# Patient Record
Sex: FEMALE | Race: WHITE | NOT HISPANIC OR LATINO | Employment: OTHER | URBAN - METROPOLITAN AREA
[De-identification: names, ages, dates, MRNs, and addresses within clinical notes are randomized per-mention and may not be internally consistent; named-entity substitution may affect disease eponyms.]

---

## 2017-11-09 ENCOUNTER — ALLSCRIPTS OFFICE VISIT (OUTPATIENT)
Dept: OTHER | Facility: OTHER | Age: 82
End: 2017-11-09

## 2017-11-09 LAB
BILIRUB UR QL STRIP: ABNORMAL
CLARITY UR: ABNORMAL
COLOR UR: YELLOW
GLUCOSE (HISTORICAL): ABNORMAL
HGB UR QL STRIP.AUTO: ABNORMAL
KETONES UR STRIP-MCNC: ABNORMAL MG/DL
LEUKOCYTE ESTERASE UR QL STRIP: ABNORMAL
NITRITE UR QL STRIP: ABNORMAL
PH UR STRIP.AUTO: 5 [PH]
PROT UR STRIP-MCNC: ABNORMAL MG/DL
SP GR UR STRIP.AUTO: 1.01
UROBILINOGEN UR QL STRIP.AUTO: 0.2

## 2018-01-13 VITALS
WEIGHT: 171 LBS | HEIGHT: 64 IN | DIASTOLIC BLOOD PRESSURE: 66 MMHG | SYSTOLIC BLOOD PRESSURE: 124 MMHG | BODY MASS INDEX: 29.19 KG/M2

## 2018-05-11 RX ORDER — METFORMIN HYDROCHLORIDE 750 MG/1
1 TABLET, EXTENDED RELEASE ORAL DAILY
Status: ON HOLD | COMMUNITY
End: 2019-09-14 | Stop reason: CLARIF

## 2018-05-11 RX ORDER — GLIPIZIDE 10 MG
1 TABLET, EXTENDED RELEASE 24 HR ORAL DAILY
Status: ON HOLD | COMMUNITY
End: 2019-09-14 | Stop reason: CLARIF

## 2018-05-11 RX ORDER — LOSARTAN POTASSIUM 100 MG/1
1 TABLET ORAL DAILY
COMMUNITY
End: 2019-09-16 | Stop reason: HOSPADM

## 2018-05-11 RX ORDER — DOCUSATE SODIUM 100 MG/1
1 CAPSULE, LIQUID FILLED ORAL 2 TIMES DAILY
Status: ON HOLD | COMMUNITY
End: 2020-02-19 | Stop reason: ALTCHOICE

## 2018-05-11 RX ORDER — LEVOTHYROXINE SODIUM 0.07 MG/1
1 TABLET ORAL DAILY
COMMUNITY
End: 2022-01-01 | Stop reason: SDUPTHER

## 2018-05-11 RX ORDER — OMEPRAZOLE 20 MG/1
1 CAPSULE, DELAYED RELEASE ORAL DAILY
COMMUNITY
End: 2022-01-01

## 2018-05-11 RX ORDER — METOPROLOL SUCCINATE 25 MG/1
1 TABLET, EXTENDED RELEASE ORAL DAILY
COMMUNITY
End: 2020-09-23 | Stop reason: HOSPADM

## 2018-05-11 RX ORDER — FUROSEMIDE 20 MG/1
1 TABLET ORAL DAILY
COMMUNITY
End: 2019-04-24 | Stop reason: SDUPTHER

## 2018-05-11 RX ORDER — CHOLECALCIFEROL (VITAMIN D3) 25 MCG
1 TABLET,CHEWABLE ORAL DAILY
COMMUNITY
End: 2022-01-01 | Stop reason: SDUPTHER

## 2018-05-11 RX ORDER — CLOPIDOGREL BISULFATE 75 MG/1
1 TABLET ORAL DAILY
Status: ON HOLD | COMMUNITY
End: 2019-09-14 | Stop reason: CLARIF

## 2018-05-11 RX ORDER — SIMVASTATIN 40 MG
1 TABLET ORAL DAILY
COMMUNITY
End: 2022-01-01 | Stop reason: SDUPTHER

## 2018-05-11 RX ORDER — REPAGLINIDE 0.5 MG/1
1 TABLET ORAL EVERY 8 HOURS
Status: ON HOLD | COMMUNITY
End: 2019-09-14 | Stop reason: CLARIF

## 2018-05-11 RX ORDER — SOLIFENACIN SUCCINATE 5 MG/1
1 TABLET, FILM COATED ORAL DAILY
COMMUNITY
Start: 2017-11-20 | End: 2019-04-24 | Stop reason: ALTCHOICE

## 2018-05-11 RX ORDER — METHOCARBAMOL 500 MG/1
1 TABLET, FILM COATED ORAL EVERY 6 HOURS PRN
Status: ON HOLD | COMMUNITY
End: 2019-09-14 | Stop reason: CLARIF

## 2018-05-11 RX ORDER — FLURANDRENOLIDE 0.5 MG/ML
LOTION TOPICAL 2 TIMES DAILY
Status: ON HOLD | COMMUNITY
End: 2019-09-14 | Stop reason: CLARIF

## 2018-05-12 ENCOUNTER — TRANSCRIBE ORDERS (OUTPATIENT)
Dept: ADMINISTRATIVE | Facility: HOSPITAL | Age: 83
End: 2018-05-12

## 2018-05-12 ENCOUNTER — HOSPITAL ENCOUNTER (EMERGENCY)
Facility: HOSPITAL | Age: 83
Discharge: HOME | End: 2018-05-12
Attending: EMERGENCY MEDICINE | Admitting: EMERGENCY MEDICINE
Payer: MEDICARE

## 2018-05-12 ENCOUNTER — APPOINTMENT (EMERGENCY)
Dept: RADIOLOGY | Facility: HOSPITAL | Age: 83
End: 2018-05-12
Payer: MEDICARE

## 2018-05-12 VITALS
WEIGHT: 170 LBS | OXYGEN SATURATION: 98 % | SYSTOLIC BLOOD PRESSURE: 190 MMHG | BODY MASS INDEX: 29.18 KG/M2 | TEMPERATURE: 98 F | HEART RATE: 70 BPM | DIASTOLIC BLOOD PRESSURE: 84 MMHG | RESPIRATION RATE: 20 BRPM

## 2018-05-12 DIAGNOSIS — I10 HYPERTENSION: ICD-10-CM

## 2018-05-12 DIAGNOSIS — R10.11 ABDOMINAL PAIN, RIGHT UPPER QUADRANT: Primary | ICD-10-CM

## 2018-05-12 DIAGNOSIS — K80.20 CHOLELITHIASIS: Primary | ICD-10-CM

## 2018-05-12 DIAGNOSIS — R10.9 ABDOMINAL PAIN: ICD-10-CM

## 2018-05-12 DIAGNOSIS — M54.9 BACK PAIN: ICD-10-CM

## 2018-05-12 LAB
ALBUMIN SERPL BCP-MCNC: 3.2 G/DL (ref 3.5–5)
ALP SERPL-CCNC: 93 U/L (ref 46–116)
ALT SERPL W P-5'-P-CCNC: 14 U/L (ref 12–78)
ANION GAP SERPL CALCULATED.3IONS-SCNC: 7 MMOL/L (ref 4–13)
AST SERPL W P-5'-P-CCNC: 12 U/L (ref 5–45)
BASOPHILS # BLD AUTO: 0.01 THOUSANDS/ΜL (ref 0–0.1)
BASOPHILS NFR BLD AUTO: 0 % (ref 0–1)
BILIRUB SERPL-MCNC: 0.26 MG/DL (ref 0.2–1)
BUN SERPL-MCNC: 24 MG/DL (ref 5–25)
CALCIUM SERPL-MCNC: 8.8 MG/DL (ref 8.3–10.1)
CHLORIDE SERPL-SCNC: 105 MMOL/L (ref 100–108)
CO2 SERPL-SCNC: 24 MMOL/L (ref 21–32)
CREAT SERPL-MCNC: 1.7 MG/DL (ref 0.6–1.3)
EOSINOPHIL # BLD AUTO: 0.11 THOUSAND/ΜL (ref 0–0.61)
EOSINOPHIL NFR BLD AUTO: 2 % (ref 0–6)
ERYTHROCYTE [DISTWIDTH] IN BLOOD BY AUTOMATED COUNT: 13.3 % (ref 11.6–15.1)
GFR SERPL CREATININE-BSD FRML MDRD: 27 ML/MIN/1.73SQ M
GLUCOSE SERPL-MCNC: 301 MG/DL (ref 65–140)
HCT VFR BLD AUTO: 35.9 % (ref 34.8–46.1)
HGB BLD-MCNC: 11.5 G/DL (ref 11.5–15.4)
LACTATE SERPL-SCNC: 1.4 MMOL/L (ref 0.5–2)
LIPASE SERPL-CCNC: 105 U/L (ref 73–393)
LYMPHOCYTES # BLD AUTO: 1.25 THOUSANDS/ΜL (ref 0.6–4.47)
LYMPHOCYTES NFR BLD AUTO: 17 % (ref 14–44)
MCH RBC QN AUTO: 30.2 PG (ref 26.8–34.3)
MCHC RBC AUTO-ENTMCNC: 32 G/DL (ref 31.4–37.4)
MCV RBC AUTO: 94 FL (ref 82–98)
MONOCYTES # BLD AUTO: 0.88 THOUSAND/ΜL (ref 0.17–1.22)
MONOCYTES NFR BLD AUTO: 12 % (ref 4–12)
NEUTROPHILS # BLD AUTO: 5.28 THOUSANDS/ΜL (ref 1.85–7.62)
NEUTS SEG NFR BLD AUTO: 69 % (ref 43–75)
NRBC BLD AUTO-RTO: 0 /100 WBCS
PLATELET # BLD AUTO: 190 THOUSANDS/UL (ref 149–390)
PMV BLD AUTO: 11.8 FL (ref 8.9–12.7)
POTASSIUM SERPL-SCNC: 4.6 MMOL/L (ref 3.5–5.3)
PROT SERPL-MCNC: 7 G/DL (ref 6.4–8.2)
RBC # BLD AUTO: 3.81 MILLION/UL (ref 3.81–5.12)
SODIUM SERPL-SCNC: 136 MMOL/L (ref 136–145)
WBC # BLD AUTO: 7.53 THOUSAND/UL (ref 4.31–10.16)

## 2018-05-12 PROCEDURE — 99203 OFFICE O/P NEW LOW 30 MIN: CPT | Performed by: SURGERY

## 2018-05-12 PROCEDURE — 96360 HYDRATION IV INFUSION INIT: CPT

## 2018-05-12 PROCEDURE — 36415 COLL VENOUS BLD VENIPUNCTURE: CPT | Performed by: EMERGENCY MEDICINE

## 2018-05-12 PROCEDURE — 99284 EMERGENCY DEPT VISIT MOD MDM: CPT

## 2018-05-12 PROCEDURE — 76705 ECHO EXAM OF ABDOMEN: CPT

## 2018-05-12 PROCEDURE — 96361 HYDRATE IV INFUSION ADD-ON: CPT

## 2018-05-12 PROCEDURE — 85025 COMPLETE CBC W/AUTO DIFF WBC: CPT | Performed by: EMERGENCY MEDICINE

## 2018-05-12 PROCEDURE — 80053 COMPREHEN METABOLIC PANEL: CPT | Performed by: EMERGENCY MEDICINE

## 2018-05-12 PROCEDURE — 83605 ASSAY OF LACTIC ACID: CPT | Performed by: EMERGENCY MEDICINE

## 2018-05-12 PROCEDURE — 83690 ASSAY OF LIPASE: CPT | Performed by: EMERGENCY MEDICINE

## 2018-05-12 RX ADMIN — SODIUM CHLORIDE 1000 ML: 0.9 INJECTION, SOLUTION INTRAVENOUS at 14:45

## 2018-05-12 NOTE — ED ATTENDING ATTESTATION
Anita Corona MD, saw and evaluated the patient  I have discussed the patient with the resident/non-physician practitioner and agree with the resident's/non-physician practitioner's findings, Plan of Care, and MDM as documented in the resident's/non-physician practitioner's note, except where noted  All available labs and Radiology studies were reviewed  At this point I agree with the current assessment done in the Emergency Department  I have conducted an independent evaluation of this patient a history and physical is as follows:  80year-old developed right upper quadrant pain today  Felt well upon awakening went out to eat ran some errands and then developed sudden severe right upper quadrant abdominal pain  She was seen in urgent care center where she was given an IM medication for pain as well as IV antibiotics and referred here for possible cholecystitis  On physical exam the patient is now non tender with no guarding or rebound  Bedside ultrasound reveals a large stone in the gallbladder with minimal wall thickenning and no sheron cholic fluid     Patient already has a formal ultrasound ordered we will obtain same      Critical Care Time  CritCare Time    Procedures

## 2018-05-12 NOTE — ED NOTES
Pt upset that her "80year old friend is sitting in my car  I want to go out and get her " Explained that we can not walk her outside to look for her friend  Contacted security to retrieve friend        Merry Chao RN  05/12/18 3510

## 2018-05-12 NOTE — DISCHARGE INSTRUCTIONS
Acute Abdominal Pain   WHAT YOU NEED TO KNOW:   What do I need to know about acute abdominal pain? Acute abdominal pain usually starts suddenly and gets worse quickly  What are minor causes of acute abdominal pain? · An allergic reaction to food, or food poisoning    · Stress    · Acid reflux    · Constipation    · Monthly period pain in females  What are serious causes of acute abdominal pain? · Inflammation or rupture of your appendix    · Swelling or an infection in your abdomen or organ    · A blockage in your bowels    · An ulcer or a tear in your esophagus, stomach, or intestines    · Bleeding in your abdomen or an organ    · Stones in your kidney or gallbladder    · Diseases of the fallopian tubes or ovaries    · An ectopic pregnancy  How is the cause of acute abdominal pain diagnosed? Your healthcare provider will ask about your signs and symptoms  Tell the provider when your symptoms started and about any recent travel or surgery  Also tell him what makes the pain better or worse, and what treatments you have tried  The provider will examine you  Based on what your provider finds from the exam, and your symptoms, you may need other tests  Examples include blood or urine tests, an ultrasound, a CT scan, or an endoscopy  How is acute abdominal pain treated? Treatment may depend on the cause of your abdominal pain  You may need any of the following:  · Medicines  may be given to decrease pain, treat an infection, and manage your symptoms, such as constipation  · Surgery  may be needed to treat a serious cause of abdominal pain  Examples include surgery to treat appendicitis or a blockage in your bowels  How can I manage my symptoms? · Apply heat  on your abdomen for 20 to 30 minutes every 2 hours for as many days as directed  Heat helps decrease pain and muscle spasms  · Make changes to the food you eat as directed    Do not eat foods that cause abdominal pain or other symptoms  Eat small meals more often  ¨ Eat more high-fiber foods if you are constipated  High-fiber foods include fruits, vegetables, whole-grain foods, and legumes  ¨ Do not eat foods that cause gas if you have bloating  Examples include broccoli, cabbage, and cauliflower  Do not drink soda or carbonated drinks, because these may also cause gas  ¨ Do not eat foods or drinks that contain sorbitol or fructose if you have diarrhea and bloating  Some examples are fruit juices, candy, jelly, and sugar-free gum  ¨ Do not eat high-fat foods, such as fried foods, cheeseburgers, hot dogs, and desserts  ¨ Limit or do not drink caffeine  Caffeine may make symptoms, such as heart burn or nausea, worse  ¨ Drink plenty of liquids to prevent dehydration from diarrhea or vomiting  Ask your healthcare provider how much liquid to drink each day and which liquids are best for you  · Manage your stress  Stress may cause abdominal pain  Your healthcare provider may recommend relaxation techniques and deep breathing exercises to help decrease your stress  Your healthcare provider may recommend you talk to someone about your stress or anxiety, such as a counselor or a trusted friend  Get plenty of sleep and exercise regularly  · Limit or do not drink alcohol  Alcohol can make your abdominal pain worse  Ask your healthcare provider if it is safe for you to drink alcohol  Also ask how much is safe for you to drink  · Do not smoke  Nicotine and other chemicals in cigarettes can damage your esophagus and stomach  Ask your healthcare provider for information if you currently smoke and need help to quit  E-cigarettes or smokeless tobacco still contain nicotine  Talk to your healthcare provider before you use these products  When should I seek immediate care? · You vomit blood or cannot stop vomiting  · You have blood in your bowel movement or it looks like tar       · You have bleeding from your rectum  · Your abdomen is larger than usual, more painful, and hard  · You have severe pain in your abdomen  · You stop passing gas and having bowel movements  · You feel weak, dizzy, or faint  When should I contact my healthcare provider? · You have a fever  · You have new signs and symptoms  · Your symptoms do not get better with treatment  · You have questions or concerns about your condition or care  CARE AGREEMENT:   You have the right to help plan your care  Learn about your health condition and how it may be treated  Discuss treatment options with your caregivers to decide what care you want to receive  You always have the right to refuse treatment  The above information is an  only  It is not intended as medical advice for individual conditions or treatments  Talk to your doctor, nurse or pharmacist before following any medical regimen to see if it is safe and effective for you  © 2017 [address] [employer] Information is for End User's use only and may not be sold, redistributed or otherwise used for commercial purposes  All illustrations and images included in CareNotes® are the copyrighted property of [employer]  or [employer]  Gallstones   WHAT YOU NEED TO KNOW:   What are gallstones? Gallstones, also called cholelithiasis, are hard substances that form in your gallbladder or bile duct  Your gallbladder and bile duct are located on the right side of your abdomen, near your liver  Your gallbladder stores bile, which helps break down the fat that you eat  Your gallbladder also helps remove certain chemicals from your body  What causes gallstones? Gallstones develop when your gallbladder does not empty correctly  Stones can form from different bile materials   The following may increase your risk:  · Obesity    · Pregnancy    · Having a family member with gallstones    · Diabetes or previous surgery of the intestines    · Rapid weight loss    · Certain medicines, such as estrogen, antibiotics, and cholesterol-lowering medicines  What are the signs and symptoms of gallstones? The most common symptom is severe, constant pain in the right upper abdomen  It is usually just below the ribcage  The pain may also be felt in the right shoulder and between the shoulder blades  You may also have any of the following:  · Nausea and vomiting    · Feeling bloated    · Pale bowel movements    · Dark urine  How are gallstones diagnosed? · Blood tests  may show signs of infection or inflammation  · An abdominal ultrasound  uses sound waves to show pictures of your abdomen on a monitor  · A liver and gallbladder scan  may also be called a HIDA scan  You are given a small amount of radioactive dye in your IV and pictures are taken by a scanner  Your healthcare provider looks at the pictures to see if your liver and gallbladder are working normally  · An ERCP  is also called an endoscopic retrograde cholangiopancreatography  This test is done during an endoscopy to find stones, tumors, or other problems  Dye is put into the endoscopy tube  The dye helps your pancreas and bile ducts show up better on x-rays  Tell the healthcare provider if you have ever had an allergic reaction to contrast dye  If you have stones, they may be removed during ERCP  · Oral cholecystography  is a test to look at your gallbladder and its ducts (passages)  You will take pills that have a special dye in them  Then x-rays are taken over time  The dye makes your gallbladder and its ducts show up on the x-rays  This may make it easier for your healthcare provider to see any stones or swelling in your gallbladder  Tell the healthcare provider if you have ever had an allergic reaction to contrast dye  It is also very important to tell your healthcare provider if there is any chance you could be pregnant   Your healthcare provider will tell you what you can and cannot eat before the test  It is important to follow your healthcare provider's instructions or the test may not work  How are gallstones treated? · Antinausea medicine  may be given to calm your stomach and to help prevent vomiting  · Prescription pain medicine  may be given  Ask your healthcare provider how to take this medicine safely  · A cholecystectomy  is surgery to remove your gallbladder  When should I contact my healthcare provider? · You have nausea and are vomiting  · Your urine is dark  · You have livier-colored bowel movements  · You have questions or concerns about your condition or care  When should I seek immediate care or call 911? · You have a fever and chills  · Your eyes or skin turn yellow  · You have severe pain in your upper abdomen, just below the right ribcage  CARE AGREEMENT:   You have the right to help plan your care  Learn about your health condition and how it may be treated  Discuss treatment options with your caregivers to decide what care you want to receive  You always have the right to refuse treatment  The above information is an  only  It is not intended as medical advice for individual conditions or treatments  Talk to your doctor, nurse or pharmacist before following any medical regimen to see if it is safe and effective for you  © 2017 [employer]  Information is for End User's use only and may not be sold, redistributed or otherwise used for commercial purposes  All illustrations and images included in CareNotes® are the copyrighted property of [employer]  or [employer]  Hypertension   WHAT YOU NEED TO KNOW:   Hypertension is high blood pressure (BP)  Your BP is the force of your blood moving against the walls of your arteries  Normal BP is less than 120/80  Prehypertension is between 120/80 and 139/89  Hypertension is 140/90 or higher   Hypertension causes your BP to get so high that your heart has to work much harder than normal  This can damage your heart  You can control hypertension with a healthy lifestyle or medicines  A controlled blood pressure helps protect your organs, such as your heart, lungs, brain, and kidneys  DISCHARGE INSTRUCTIONS:   Call 911 for any of the following:   · You have discomfort in your chest that feels like squeezing, pressure, fullness, or pain  · You become confused or have difficulty speaking  · You suddenly feel lightheaded or have trouble breathing  · You have pain or discomfort in your back, neck, jaw, stomach, or arm  Return to the emergency department if:   · You have a severe headache or vision loss  · You have weakness in an arm or leg  Contact your healthcare provider if:   · You feel faint, dizzy, confused, or drowsy  · You have been taking your BP medicine and your BP is still higher than your healthcare provider says it should be  · You have questions or concerns about your condition or care  Medicines: You may  need any of the following:  · Medicine  may be used to help lower your BP  You may need more than one type of medicine  Take the medicine exactly as directed  · Diuretics  help decrease extra fluid that collects in your body  This will help lower your BP  You may urinate more often while you take this medicine  · Cholesterol medicine  helps lower your cholesterol level  A low cholesterol level helps prevent heart disease and makes it easier to control your blood pressure  · Take your medicine as directed  Contact your healthcare provider if you think your medicine is not helping or if you have side effects  Tell him or her if you are allergic to any medicine  Keep a list of the medicines, vitamins, and herbs you take  Include the amounts, and when and why you take them  Bring the list or the pill bottles to follow-up visits  Carry your medicine list with you in case of an emergency    Follow up with your healthcare provider as directed: You will need to return to have your BP checked and to have other lab tests done  Write down your questions so you remember to ask them during your visits  Manage hypertension:  Talk with your healthcare provider about these and other ways to manage hypertension:  · Check your BP at home  Sit and rest for 5 minutes before you take your BP  Extend your arm and support it on a flat surface  Your arm should be at the same level as your heart  Follow the directions that came with your BP monitor  If possible, take at least 2 BP readings each time  Take your BP at least twice a day at the same times each day, such as morning and evening  Keep a record of your BP readings and bring it to your follow-up visits  Ask your healthcare provider what your BP should be  · Limit sodium (salt) as directed  Too much sodium can affect your fluid balance  Check labels to find low-sodium or no-salt-added foods  Some low-sodium foods use potassium salts for flavor  Too much potassium can also cause health problems  Your healthcare provider will tell you how much sodium and potassium are safe for you to have in a day  He or she may recommend that you limit sodium to 2,300 mg a day  · Follow the meal plan recommended by your healthcare provider  A dietitian or your provider can give you more information on low-sodium plans or the DASH (Dietary Approaches to Stop Hypertension) eating plan  The DASH plan is low in sodium, unhealthy fats, and total fat  It is high in potassium, calcium, and fiber  · Exercise to maintain a healthy weight  Exercise at least 30 minutes per day, on most days of the week  This will help decrease your blood pressure  Ask your healthcare provider about the best exercise plan for you  · Decrease stress  This may help lower your BP  Learn ways to relax, such as deep breathing or listening to music  · Limit alcohol  Women should limit alcohol to 1 drink a day  Men should limit alcohol to 2 drinks a day  A drink of alcohol is 12 ounces of beer, 5 ounces of wine, or 1½ ounces of liquor  · Do not smoke  Nicotine and other chemicals in cigarettes and cigars can increase your BP and also cause lung damage  Ask your healthcare provider for information if you currently smoke and need help to quit  E-cigarettes or smokeless tobacco still contain nicotine  Talk to your healthcare provider before you use these products  · Manage any other health conditions you have  Health conditions such as diabetes can increase your risk for hypertension  Follow your healthcare provider's instructions and take all your medicines as directed  © 2017 [address] [facility] Information is for End User's use only and may not be sold, redistributed or otherwise used for commercial purposes  All illustrations and images included in CareNotes® are the copyrighted property of [employer]  or [employer]  The above information is an  only  It is not intended as medical advice for individual conditions or treatments  Talk to your doctor, nurse or pharmacist before following any medical regimen to see if it is safe and effective for you

## 2018-05-12 NOTE — CONSULTS
Consultation - General Surgery   Marge Turner 80 y o  female MRN: [id number]  Unit/Bed#: ED 13 Encounter: [id number]    Assessment/Plan     Assessment:    87y/o presenting with acute RUQ pain found to have GB sludge & cholelithiasis  Pain has now resolved and pt would like to continue conservative treatment for now      Plan:  Pt would like to continue with conservative treatment for now    Pt will schedule cholecystectomy as outpatient if she decides to go further with surgical intervention - she can follow up with Dr Teena Willoughby in the office   [employer] to d/c - as no surgical intervention planned at this time       History of Present Illness     HPI:  Marge Turner is a 80 y o  female who presents with acute onset of right upper quadrant pain that started a few hours after eating a large breakfast of sausage and Portuguese toast  Pt states pain was severe and sharp  She has never had pain like this before  No nausea or vomiting  She went to a walk in clinic, was given IV abx & pain medicine and sent to the ED for an [address]  When she arrived in the ED she was no longer c/o abd pain  US showed cholelithiasis and sludge but no pericholecystic fluid  She was non tender on exam     Inpatient consult to Acute Care Surgery  Consult performed by: Wang Garcia ordered by: Yolanda Vail          Review of Systems   Constitutional: Negative  Respiratory: Negative  Cardiovascular: Negative  Gastrointestinal: Positive for abdominal pain  Negative for constipation, diarrhea, nausea and vomiting  Endocrine: Negative  Genitourinary: Negative  Musculoskeletal: Negative  Skin: Negative  All other systems reviewed and are negative        Historical Information   Past Medical History:   Diagnosis Date    Acquired absence of kidney     Frequency of micturition     Malignant neoplasm of left kidney, except renal pelvis (HCC)     Nocturia     Personal history of other malignant neoplasm of kidney  Renal mass, left     UTI (urinary tract infection)      Past Surgical History:   Procedure Laterality Date    RADICAL NEPHRECTOMY Left 2013     Social History   History   Alcohol Use No     History   Drug Use No     History   Smoking Status    Never Smoker   Smokeless Tobacco    Never Used     Family History:   Family History   Problem Relation Age of Onset    Diabetes Father     Heart failure Brother        Meds/Allergies   all current active meds have been reviewed, current meds:   No current facility-administered medications for this encounter  and PTA meds:   Prior to Admission Medications   Prescriptions Last Dose Informant Patient Reported? Taking?    Cyanocobalamin (B-12) 1000 MCG CAPS   Yes No   Sig: Take 1 tablet by mouth daily   Linagliptin (TRADJENTA) 5 MG TABS   Yes No   Sig: Take 1 tablet by mouth daily   aspirin 81 MG tablet   Yes No   Sig: Take 1 tablet by mouth daily   clopidogrel (PLAVIX) 75 mg tablet   Yes No   Sig: Take 1 tablet by mouth daily   diclofenac sodium (VOLTAREN) 1 %   Yes No   Sig: Place on the skin   docusate sodium (COLACE) 100 mg capsule   Yes No   Sig: Take 1 capsule by mouth 2 (two) times a day   flurandrenolide (CORDRAN) 0 05 % lotion   Yes No   Sig: Apply topically 2 (two) times a day   furosemide (LASIX) 20 mg tablet   Yes No   Sig: Take 1 tablet by mouth daily   glipiZIDE (GLUCOTROL XL) 10 mg 24 hr tablet   Yes No   Sig: Take 1 tablet by mouth daily   levothyroxine 75 mcg tablet   Yes No   Sig: Take 1 tablet by mouth daily   losartan (COZAAR) 100 MG tablet   Yes No   Sig: Take 1 tablet by mouth daily   magnesium chloride (MAG-SR) 535 mg   Yes No   Sig: Take 1 tablet by mouth daily   metFORMIN (GLUCOPHAGE-XR) 750 mg 24 hr tablet   Yes No   Sig: Take 1 tablet by mouth daily   methocarbamol (ROBAXIN) 500 mg tablet   Yes No   Sig: Take 1 tablet by mouth every 6 (six) hours as needed   metoprolol succinate (TOPROL-XL) 25 mg 24 hr tablet   Yes No   Sig: Take 1 tablet by mouth daily   omeprazole (PRILOSEC) 20 mg delayed release capsule   Yes No   Sig: Take 1 capsule by mouth daily   repaglinide (PRANDIN) 0 5 mg tablet   Yes No   Sig: Take 1 tablet by mouth every 8 (eight) hours   simvastatin (ZOCOR) 40 mg tablet   Yes No   Sig: Take 1 tablet by mouth daily   solifenacin (VESICARE) 5 mg tablet   Yes No   Sig: Take 1 tablet by mouth daily      Facility-Administered Medications: None     Allergies   Allergen Reactions    Cephalexin     Clindamycin     Doxycycline     Erythromycin Base     Lactate     Levaquin [Levofloxacin]     Penicillins     Procainamide     Quinidine (Non-Therapeutic)     Sulfa Antibiotics        Objective   First Vitals:   Blood Pressure: (!) 202/93 (05/12/18 1343)  Pulse: 63 (05/12/18 1343)  Temperature: 98 °F (36 7 °C) (05/12/18 1343)  Temp Source: Oral (05/12/18 1343)  Respirations: 18 (05/12/18 1343)  Weight - Scale: 77 1 kg (170 lb) (05/12/18 1343)  SpO2: 99 % (05/12/18 1343)    Current Vitals:   Blood Pressure: (!) 209/89 (05/12/18 1725)  Pulse: 75 (05/12/18 1725)  Temperature: 98 °F (36 7 °C) (05/12/18 1343)  Temp Source: Oral (05/12/18 1343)  Respirations: 20 (05/12/18 1725)  Weight - Scale: 77 1 kg (170 lb) (05/12/18 1343)  SpO2: 100 % (05/12/18 1725)    No intake or output data in the 24 hours ending 05/12/18 1757    Invasive Devices     Peripheral Intravenous Line            Peripheral IV 05/12/18 Left Antecubital less than 1 day                Physical Exam   Constitutional: She is oriented to person, place, and time  She appears well-developed and well-nourished  No distress  Cardiovascular: Normal rate, regular rhythm and normal heart sounds  Exam reveals no gallop and no friction rub  No murmur heard  Pulmonary/Chest: Breath sounds normal  No respiratory distress  She has no wheezes  She has no rales  She exhibits no tenderness  Abdominal: Soft  Bowel sounds are normal  She exhibits no distension and no mass   There is no tenderness  There is no rebound and no guarding  Musculoskeletal: Normal range of motion  Neurological: She is alert and oriented to person, place, and time  Skin: Skin is warm and dry  She is not diaphoretic  Nursing note and vitals reviewed  Lab Results:   I have personally reviewed pertinent lab results  , CBC:   Lab Results   Component Value Date    WBC 7 53 05/12/2018    HGB 11 5 05/12/2018    HCT 35 9 05/12/2018    MCV 94 05/12/2018     05/12/2018    MCH 30 2 05/12/2018    MCHC 32 0 05/12/2018    RDW 13 3 05/12/2018    MPV 11 8 05/12/2018    NRBC 0 05/12/2018   , CMP:   Lab Results   Component Value Date     05/12/2018    K 4 6 05/12/2018     05/12/2018    CO2 24 05/12/2018    ANIONGAP 7 05/12/2018    BUN 24 05/12/2018    CREATININE 1 70 (H) 05/12/2018    GLUCOSE 301 (H) 05/12/2018    CALCIUM 8 8 05/12/2018    AST 12 05/12/2018    ALT 14 05/12/2018    ALKPHOS 93 05/12/2018    PROT 7 0 05/12/2018    BILITOT 0 26 05/12/2018    EGFR 27 05/12/2018   , Coagulation: No results found for: PT, INR, APTT, Urinalysis: No results found for: Catarina Body, SPECGRAV, PHUR, LEUKOCYTESUR, NITRITE, PROTEINUA, GLUCOSEU, KETONESU, BILIRUBINUR, BLOODU, Amylase: No results found for: AMYLASE, Lipase:   Lab Results   Component Value Date    LIPASE 105 05/12/2018     Imaging: I have personally reviewed pertinent reports  and I have personally reviewed pertinent films in PACS     Us Right Upper Quadrant    Result Date: 5/12/2018  Narrative: RIGHT UPPER QUADRANT ULTRASOUND INDICATION:     Right upper quadrant pain  Evaluate for cholecystitis  COMPARISON:  None TECHNIQUE:   Real-time ultrasound of the right upper quadrant was performed with a curvilinear transducer with both volumetric sweeps and still imaging techniques  FINDINGS: PANCREAS:  Visualized portions of the pancreas are within normal limits  AORTA AND IVC:  Visualized portions are normal for patient age  LIVER: Size:  Enlarged    The liver measures 17 1 cm in the midclavicular line  Contour:  Surface contour is smooth  Parenchyma: There is mild diffuse increased echogenicity with smooth echotexture, without significant beam attenuation or loss of periportal echogenicity  Most consistent with mild hepatic steatosis  No evidence of suspicious mass  Limited imaging of the main portal vein shows it to be patent and hepatopetal   BILIARY: Gallbladder is distended measuring 10 4 cm with sludge and stones  Thickened wall measuring 4 mm  No pericholecystic fluid  No sonographic Carver's sign  No intrahepatic biliary dilatation  CBD measures 5 mm  No choledocholithiasis  KIDNEY: Right kidney measures 9 5 x 4 5 cm  Simple lower pole cyst measures 7 x 8 mm  ASCITES:   None  Impression: Distended gallbladder with stones and sludge and wall thickening  No pericholecystic fluid  Negative Carver's sign  Findings may represent acute cholecystitis in the appropriate clinical setting  Hepatomegaly with fatty infiltration  EKG, Pathology, and Other Studies: I have personally reviewed pertinent reports  Counseling / Coordination of Care  Total floor / unit time spent today 30 minutes  Greater than 50% of total time was spent with the patient and / or family counseling and / or coordination of care

## 2018-05-12 NOTE — ED PROVIDER NOTES
History  Chief Complaint   Patient presents with    Abdominal Pain     Patient reports that she was sent here by [facility]  Says that she was sent in for an ultrasound, states that she was told that it could be her gallbladder or liver  No blood work was  done, patient recieved IV abx  HPI   20-year-old female with history of CAD  Presenting to the emergency room for evaluation of abdominal pain  Patient says it started suddenly earlier today  She says is a twisting pain in the right upper quadrant  She had eaten breakfast, went to get her hair done, came home and suddenly had the pain  She went to an urgent care clinic, was given pain medication, intramuscularly, and she said she felt better  Currently, she says the pain is resolved  She says that before, the pain was nonradiating, never had pain like that before  She did have some nausea, no emesis  Currently, she says she feels fine  Apparently, patient was scheduled for an outpatient ultrasound today, however she came to the ER instead  She may have been confused about where she had ago  Patient denies any fevers and chills, no chest pain shortness of breath, no urinary complaints, had a bowel movement this morning with no blood and normal and caliber  Patient's surgical history includes appendectomy  Additional hx of CKD  Patient denies any alcohol use  Prior to Admission Medications   Prescriptions Last Dose Informant Patient Reported? Taking?    Cyanocobalamin (B-12) 1000 MCG CAPS   Yes No   Sig: Take 1 tablet by mouth daily   Linagliptin (TRADJENTA) 5 MG TABS   Yes No   Sig: Take 1 tablet by mouth daily   aspirin 81 MG tablet   Yes No   Sig: Take 1 tablet by mouth daily   clopidogrel (PLAVIX) 75 mg tablet   Yes No   Sig: Take 1 tablet by mouth daily   diclofenac sodium (VOLTAREN) 1 %   Yes No   Sig: Place on the skin   docusate sodium (COLACE) 100 mg capsule   Yes No   Sig: Take 1 capsule by mouth 2 (two) times a day flurandrenolide (CORDRAN) 0 05 % lotion   Yes No   Sig: Apply topically 2 (two) times a day   furosemide (LASIX) 20 mg tablet   Yes No   Sig: Take 1 tablet by mouth daily   glipiZIDE (GLUCOTROL XL) 10 mg 24 hr tablet   Yes No   Sig: Take 1 tablet by mouth daily   levothyroxine 75 mcg tablet   Yes No   Sig: Take 1 tablet by mouth daily   losartan (COZAAR) 100 MG tablet   Yes No   Sig: Take 1 tablet by mouth daily   magnesium chloride (MAG-SR) 535 mg   Yes No   Sig: Take 1 tablet by mouth daily   metFORMIN (GLUCOPHAGE-XR) 750 mg 24 hr tablet   Yes No   Sig: Take 1 tablet by mouth daily   methocarbamol (ROBAXIN) 500 mg tablet   Yes No   Sig: Take 1 tablet by mouth every 6 (six) hours as needed   metoprolol succinate (TOPROL-XL) 25 mg 24 hr tablet   Yes No   Sig: Take 1 tablet by mouth daily   omeprazole (PRILOSEC) 20 mg delayed release capsule   Yes No   Sig: Take 1 capsule by mouth daily   repaglinide (PRANDIN) 0 5 mg tablet   Yes No   Sig: Take 1 tablet by mouth every 8 (eight) hours   simvastatin (ZOCOR) 40 mg tablet   Yes No   Sig: Take 1 tablet by mouth daily   solifenacin (VESICARE) 5 mg tablet   Yes No   Sig: Take 1 tablet by mouth daily      Facility-Administered Medications: None       Past Medical History:   Diagnosis Date    Acquired absence of kidney     Frequency of micturition     Malignant neoplasm of left kidney, except renal pelvis (HCC)     Nocturia     Personal history of other malignant neoplasm of kidney     Renal mass, left     UTI (urinary tract infection)        Past Surgical History:   Procedure Laterality Date    RADICAL NEPHRECTOMY Left 2013       Family History   Problem Relation Age of Onset    Diabetes Father     Heart failure Brother      I have reviewed and agree with the history as documented      Social History   Substance Use Topics    Smoking status: Never Smoker    Smokeless tobacco: Never Used    Alcohol use No        Review of Systems    Constitutional: Negative for appetite change, chills and fever  HENT: Negative for congestion, rhinorrhea and sore throat  Eyes: Negative for photophobia, pain and visual disturbance  Respiratory: Negative for cough, chest tightness and shortness of breath  Cardiovascular: Negative for chest pain, palpitations and leg swelling  Gastrointestinal:   Positive for abdominal pain and nausea,  Negative for diarrhea and vomiting  Genitourinary: Negative for dysuria, flank pain and hematuria  Musculoskeletal: Negative for back pain, neck pain and neck stiffness  Skin: Negative for color change, rash and wound  Neurological: Negative for dizziness, numbness and headaches  All other systems reviewed and are negative        Physical Exam  ED Triage Vitals [05/12/18 1343]   Temperature Pulse Respirations Blood Pressure SpO2   98 °F (36 7 °C) 63 18 (!) 202/93 99 %      Temp Source Heart Rate Source Patient Position - Orthostatic VS BP Location FiO2 (%)   Oral Monitor Sitting Right arm --      Pain Score       No Pain           Orthostatic Vital Signs  Vitals:    05/12/18 1343 05/12/18 1725 05/12/18 1838   BP: (!) 202/93 (!) 209/89 (!) 190/84   Pulse: 63 75 70   Patient Position - Orthostatic VS: Sitting Lying Lying       Physical Exam  BP (!) 190/84 (BP Location: Right arm)   Pulse 70   Temp 98 °F (36 7 °C) (Oral)   Resp 20   Wt 77 1 kg (170 lb)   SpO2 98%   BMI 29 18 kg/m²     General Appearance:  Alert, cooperative, no distress   Head:  Normocephalic, without obvious abnormality, atraumatic   Eyes:  PERRL, conjunctiva/corneas clear, EOM's intact         Nose: Nares normal, septum midline,mucosa normal, no drainage or sinus tenderness   Throat: Lips, mucosa, and tongue normal; teeth and gums normal   Neck: Supple, symmetrical, trachea midline, no adenopathy   Back:   Symmetric, no curvature, ROM normal, no CVA tenderness   Lungs:   Clear to auscultation bilaterally, respirations unlabored   Heart:  Regular rate and rhythm, S1 and S2 normal, no murmur, rub, or gallop   Abdomen:   Soft,  nondistended, no tenderness even in the right upper quadrant, bowel sounds active all four quadrants   bedside ultrasound reveals distended gallbladder, there is a stone that is about 1 5 cm in length,  Patient with gallbladder wall thickening at 5 mm  Negative Carver sign  Pelvic: Deferred   Extremities: Extremities normal, atraumatic, no cyanosis or edema   Pulses: 2+ and symmetric   Skin: Skin color, texture, turgor normal, no rashes or lesions   Neurologic:      Psychiatric: Moves all extremities, sensation and strength in tact in all extremities    Normal mood and affect         ED Medications  Medications   sodium chloride 0 9 % bolus 1,000 mL (1,000 mL Intravenous New Bag 5/12/18 1445)       Diagnostic Studies  Results Reviewed     Procedure Component Value Units Date/Time    CBC and differential [[id number]] Collected:  05/12/18 1445    Lab Status:  Final result Specimen:  Blood from Arm, Left Updated:  05/12/18 1547     WBC 7 53 Thousand/uL      RBC 3 81 Million/uL      Hemoglobin 11 5 g/dL      Hematocrit 35 9 %      MCV 94 fL      MCH 30 2 pg      MCHC 32 0 g/dL      RDW 13 3 %      MPV 11 8 fL      Platelets 401 Thousands/uL      nRBC 0 /100 WBCs      Neutrophils Relative 69 %      Lymphocytes Relative 17 %      Monocytes Relative 12 %      Eosinophils Relative 2 %      Basophils Relative 0 %      Neutrophils Absolute 5 28 Thousands/µL      Lymphocytes Absolute 1 25 Thousands/µL      Monocytes Absolute 0 88 Thousand/µL      Eosinophils Absolute 0 11 Thousand/µL      Basophils Absolute 0 01 Thousands/µL     Lactic acid, plasma [[id number]]  (Normal) Collected:  05/12/18 1445    Lab Status:  Final result Specimen:  Blood from Arm, Left Updated:  05/12/18 1531     LACTIC ACID 1 4 mmol/L     Narrative:         Result may be elevated if tourniquet was used during collection      Comprehensive metabolic panel [[id number]]  (Abnormal) Collected:  05/12/18 1445    Lab Status:  Final result Specimen:  Blood from Arm, Left Updated:  05/12/18 1522     Sodium 136 mmol/L      Potassium 4 6 mmol/L      Chloride 105 mmol/L      CO2 24 mmol/L      Anion Gap 7 mmol/L      BUN 24 mg/dL      Creatinine 1 70 (H) mg/dL      Glucose 301 (H) mg/dL      Calcium 8 8 mg/dL      AST 12 U/L      ALT 14 U/L      Alkaline Phosphatase 93 U/L      Total Protein 7 0 g/dL      Albumin 3 2 (L) g/dL      Total Bilirubin 0 26 mg/dL      eGFR 27 ml/min/1 73sq m     Narrative:         National Kidney Disease Education Program recommendations are as follows:  GFR calculation is accurate only with a steady state creatinine  Chronic Kidney disease less than 60 ml/min/1 73 sq  meters  Kidney failure less than 15 ml/min/1 73 sq  meters  Lipase [[id number]]  (Normal) Collected:  05/12/18 1445    Lab Status:  Final result Specimen:  Blood from Arm, Left Updated:  05/12/18 1522     Lipase 105 u/L                  US right upper quadrant   Final Result by Kely Urias MD (05/12 1718)      Distended gallbladder with stones and sludge and wall thickening  No pericholecystic fluid  Negative Carver's sign  Findings may represent acute cholecystitis in the appropriate clinical setting  Hepatomegaly with fatty infiltration  Workstation performed: [id number]               Procedures  Procedures      Phone Consults  ED Phone Contact    ED Course  ED Course as of May 12 1849   Sat May 12, 2018   1531   On review of prior labs, patient's creatinine anywhere from 1 5-1 8     1820  Cleared by surgery for discharge  Identification of Seniors at Risk      Most Recent Value   (ISAR) Identification of Seniors at Risk   Before the illness or injury that brought you to the Emergency, did you need someone to help you on a regular basis?   0 Filed at: 05/12/2018 1347   In the last 24 hours, have you needed more help than usual?  0 Filed at: 05/12/2018 1347   Have you been hospitalized for one or more nights during the past 6 months? 0 Filed at: 05/12/2018 1347   In general, do you see well?  0 Filed at: 05/12/2018 1347   In general, do you have serious problems with your memory? 0 Filed at: 05/12/2018 1347   Do you take more than three different medications every day? 1 Filed at: 05/12/2018 1347   ISAR Score  1 Filed at: 05/12/2018 1347            MDM     There is concern for possible cholecystitis given findings on ultrasound  Will send patient for right upper quadrant ultrasound, will check abdominal labs  Treat with IV fluids now  Reassess  We will have General surgery see patient  CritCare Time    Disposition  Final diagnoses:   Cholelithiasis   Abdominal pain   Back pain   Hypertension     Time reflects when diagnosis was documented in both MDM as applicable and the Disposition within this note     Time User Action Codes Description Comment    5/12/2018  5:41 PM Karrin Leo Add [K80 20] Cholelithiasis     5/12/2018  6:47 PM Karrin [place] Add [R10 9] Abdominal pain     5/12/2018  6:47 PM Karrin [place] Add [M54 9] Back pain     5/12/2018  6:47 PM Gigi Jimenez Hypertension       ED Disposition     ED Disposition Condition Comment    Discharge  [facility] discharge to home/self care      Condition at discharge: Stable        Follow-up Information     Follow up With Specialties Details Why Contact Info Additional Information    Kehinde Shultz MD General Surgery, Critical Care Medicine Follow up Please call the office and schedule an appointment to discuss cholecystectomy  [facility] [address] [facility]       Dara Alford MD Internal Medicine Go in 2 days for eval of blood pressure [address] [phone] [address] Emergency Department Emergency Medicine  If symptoms worsen [address]  [phone]  ED, [address] [place] [address], [phone]        Patient's Medications   Discharge Prescriptions    No medications on file     No discharge procedures on file  ED Provider  Attending physically available and evaluated [facility]  I managed the patient along with the ED Attending      Electronically Signed by         Olivier Dee MD  05/12/18 5881

## 2018-05-16 ENCOUNTER — OFFICE VISIT (OUTPATIENT)
Dept: UROLOGY | Facility: MEDICAL CENTER | Age: 83
End: 2018-05-16
Payer: MEDICARE

## 2018-05-16 VITALS
BODY MASS INDEX: 30.12 KG/M2 | WEIGHT: 170 LBS | DIASTOLIC BLOOD PRESSURE: 64 MMHG | SYSTOLIC BLOOD PRESSURE: 100 MMHG | HEIGHT: 63 IN

## 2018-05-16 DIAGNOSIS — K80.11 CALCULUS OF GALLBLADDER WITH CHOLECYSTITIS WITH BILIARY OBSTRUCTION, UNSPECIFIED CHOLECYSTITIS ACUITY: ICD-10-CM

## 2018-05-16 DIAGNOSIS — R35.0 FREQUENCY OF URINATION: ICD-10-CM

## 2018-05-16 DIAGNOSIS — Z90.5 ACQUIRED ABSENCE OF KIDNEY: ICD-10-CM

## 2018-05-16 DIAGNOSIS — Z85.528 PERSONAL HISTORY OF OTHER MALIGNANT NEOPLASM OF KIDNEY: Primary | ICD-10-CM

## 2018-05-16 LAB
POST-VOID RESIDUAL VOLUME, ML POC: 0 ML
SL AMB  POCT GLUCOSE, UA: ABNORMAL
SL AMB LEUKOCYTE ESTERASE,UA: ABNORMAL
SL AMB POCT BILIRUBIN,UA: ABNORMAL
SL AMB POCT BLOOD,UA: ABNORMAL
SL AMB POCT CLARITY,UA: CLEAR
SL AMB POCT COLOR,UA: ABNORMAL
SL AMB POCT KETONES,UA: ABNORMAL
SL AMB POCT NITRITE,UA: ABNORMAL
SL AMB POCT PH,UA: 5.5
SL AMB POCT SPECIFIC GRAVITY,UA: 1.02
SL AMB POCT URINE PROTEIN: 100
SL AMB POCT UROBILINOGEN: 0.2

## 2018-05-16 PROCEDURE — 99215 OFFICE O/P EST HI 40 MIN: CPT | Performed by: UROLOGY

## 2018-05-16 PROCEDURE — 81003 URINALYSIS AUTO W/O SCOPE: CPT | Performed by: UROLOGY

## 2018-05-16 PROCEDURE — 51798 US URINE CAPACITY MEASURE: CPT | Performed by: UROLOGY

## 2018-05-16 NOTE — PROGRESS NOTES
Assessment/Plan:        Diagnoses and all orders for this visit:    Personal history of other malignant neoplasm of kidney  -     POCT Measure PVR  -     POCT urine dip auto non-scope    Acquired absence of kidney    Frequency of urination    Calculus of gallbladder, unspecified cholecystitis acuity  -     Ambulatory referral to General Surgery; Future    Other orders  -     aspirin 81 MG tablet; Take 1 tablet by mouth daily  -     Cyanocobalamin (B-12) 1000 MCG CAPS; Take 1 tablet by mouth daily  -     docusate sodium (COLACE) 100 mg capsule; Take 1 capsule by mouth 2 (two) times a day  -     flurandrenolide (CORDRAN) 0 05 % lotion; Apply topically 2 (two) times a day  -     furosemide (LASIX) 20 mg tablet; Take 1 tablet by mouth daily  -     glipiZIDE (GLUCOTROL XL) 10 mg 24 hr tablet; Take 1 tablet by mouth daily  -     levothyroxine 75 mcg tablet; Take 1 tablet by mouth daily  -     losartan (COZAAR) 100 MG tablet; Take 1 tablet by mouth daily  -     magnesium chloride (MAG-SR) 535 mg; Take 1 tablet by mouth daily  -     metFORMIN (GLUCOPHAGE-XR) 750 mg 24 hr tablet; Take 1 tablet by mouth daily  -     methocarbamol (ROBAXIN) 500 mg tablet; Take 1 tablet by mouth every 6 (six) hours as needed  -     metoprolol succinate (TOPROL-XL) 25 mg 24 hr tablet; Take 1 tablet by mouth daily  -     clopidogrel (PLAVIX) 75 mg tablet; Take 1 tablet by mouth daily  -     repaglinide (PRANDIN) 0 5 mg tablet; Take 1 tablet by mouth every 8 (eight) hours  -     omeprazole (PRILOSEC) 20 mg delayed release capsule; Take 1 capsule by mouth daily  -     simvastatin (ZOCOR) 40 mg tablet; Take 1 tablet by mouth daily  -     Linagliptin (TRADJENTA) 5 MG TABS; Take 1 tablet by mouth daily  -     diclofenac sodium (VOLTAREN) 1 %; Place on the skin  -     solifenacin (VESICARE) 5 mg tablet; Take 1 tablet by mouth daily        Subjective:  Right upper quadrant discomfort     Patient ID: Frankey Meals is a 80 y o  female      HPI  She is now 5 years after her laparoscopic robotic left radical nephrectomy for stage pT1 RCC  Only urologic complaint is that of baseline frequency, not an issue  This past weekend, she was in the emergency room for right upper quadrant pain and found to have cholelithiasis  She was discharged from the emergency room, but she is not sure of the direction she needs to take at this point  Review of Systems   Constitutional: Negative  Negative for unexpected weight change  HENT: Negative  Eyes: Negative  Respiratory: Negative  Cardiovascular: Negative  Gastrointestinal: Positive for abdominal pain  Endocrine: Negative  Genitourinary: Positive for frequency  Musculoskeletal: Positive for back pain  Skin: Negative  Allergic/Immunologic: Negative  Neurological: Negative  Hematological: Negative  Psychiatric/Behavioral: Negative  Objective:     Physical Exam   Constitutional: She is oriented to person, place, and time  She appears well-developed and well-nourished  No distress  HENT:   Head: Normocephalic and atraumatic  Eyes: Conjunctivae are normal    Neck: Normal range of motion  Neck supple  Pulmonary/Chest: Effort normal and breath sounds normal    Abdominal: Soft  Bowel sounds are normal  She exhibits no mass  There is no tenderness  There is no CVA tenderness  No hernia  Hernia confirmed negative in the ventral area  Musculoskeletal: Normal range of motion  Neurological: She is alert and oriented to person, place, and time  Skin: Skin is warm and dry  Psychiatric: She has a normal mood and affect  Her behavior is normal  Judgment and thought content normal        05/12/2018:  RIGHT UPPER QUADRANT ULTRASOUND     INDICATION:     Right upper quadrant pain    Evaluate for cholecystitis      COMPARISON:  None     TECHNIQUE:   Real-time ultrasound of the right upper quadrant was performed with a curvilinear transducer with both volumetric sweeps and still imaging techniques      FINDINGS:     PANCREAS:  Visualized portions of the pancreas are within normal limits      AORTA AND IVC:  Visualized portions are normal for patient age      LIVER:  Size:  Enlarged  The liver measures 17 1 cm in the midclavicular line  Contour:  Surface contour is smooth  Parenchyma: There is mild diffuse increased echogenicity with smooth echotexture, without significant beam attenuation or loss of periportal echogenicity  Most consistent with mild hepatic steatosis  No evidence of suspicious mass  Limited imaging of the main portal vein shows it to be patent and hepatopetal      BILIARY:  Gallbladder is distended measuring 10 4 cm with sludge and stones  Thickened wall measuring 4 mm  No pericholecystic fluid  No sonographic Carver's sign  No intrahepatic biliary dilatation  CBD measures 5 mm  No choledocholithiasis      KIDNEY:   Right kidney measures 9 5 x 4 5 cm  Simple lower pole cyst measures 7 x 8 mm      ASCITES:   None      IMPRESSION:     Distended gallbladder with stones and sludge and wall thickening  No pericholecystic fluid  Negative Carver's sign  Findings may represent acute cholecystitis in the appropriate clinical setting      Hepatomegaly with fatty infiltration

## 2018-05-16 NOTE — LETTER
May 16, 2018     Ludivina Reed MD  [address]    Patient: Allie Camacho   YOB: 1932   Date of Visit: 5/16/2018         Mayda Polanco patient, when you can fit her in  Sincerely,    Redd Cano MD      CC: No Recipients  Romina Dillon MD  5/16/2018 10:57 AM  Sign at close encounter  Assessment/Plan:        Diagnoses and all orders for this visit:    Personal history of other malignant neoplasm of kidney  -     POCT Measure PVR  -     POCT urine dip auto non-scope    Acquired absence of kidney    Frequency of urination    Calculus of gallbladder, unspecified cholecystitis acuity  -     Ambulatory referral to General Surgery; Future    Other orders  -     aspirin 81 MG tablet; Take 1 tablet by mouth daily  -     Cyanocobalamin (B-12) 1000 MCG CAPS; Take 1 tablet by mouth daily  -     docusate sodium (COLACE) 100 mg capsule; Take 1 capsule by mouth 2 (two) times a day  -     flurandrenolide (CORDRAN) 0 05 % lotion; Apply topically 2 (two) times a day  -     furosemide (LASIX) 20 mg tablet; Take 1 tablet by mouth daily  -     glipiZIDE (GLUCOTROL XL) 10 mg 24 hr tablet; Take 1 tablet by mouth daily  -     levothyroxine 75 mcg tablet; Take 1 tablet by mouth daily  -     losartan (COZAAR) 100 MG tablet; Take 1 tablet by mouth daily  -     magnesium chloride (MAG-SR) 535 mg; Take 1 tablet by mouth daily  -     metFORMIN (GLUCOPHAGE-XR) 750 mg 24 hr tablet; Take 1 tablet by mouth daily  -     methocarbamol (ROBAXIN) 500 mg tablet; Take 1 tablet by mouth every 6 (six) hours as needed  -     metoprolol succinate (TOPROL-XL) 25 mg 24 hr tablet; Take 1 tablet by mouth daily  -     clopidogrel (PLAVIX) 75 mg tablet; Take 1 tablet by mouth daily  -     repaglinide (PRANDIN) 0 5 mg tablet; Take 1 tablet by mouth every 8 (eight) hours  -     omeprazole (PRILOSEC) 20 mg delayed release capsule;  Take 1 capsule by mouth daily  -     simvastatin (ZOCOR) 40 mg tablet; Take 1 tablet by mouth daily  -     Linagliptin (TRADJENTA) 5 MG TABS; Take 1 tablet by mouth daily  -     diclofenac sodium (VOLTAREN) 1 %; Place on the skin  -     solifenacin (VESICARE) 5 mg tablet; Take 1 tablet by mouth daily        Subjective:  Right upper quadrant discomfort     Patient ID: Sonia Fish is a 80 y o  female  HPI  She is now 5 years after her laparoscopic robotic left radical nephrectomy for stage pT1 RCC  Only urologic complaint is that of baseline frequency, not an issue  This past weekend, she was in the emergency room for right upper quadrant pain and found to have cholelithiasis  She was discharged from the emergency room, but she is not sure of the direction she needs to take at this point  Review of Systems   Constitutional: Negative  Negative for unexpected weight change  HENT: Negative  Eyes: Negative  Respiratory: Negative  Cardiovascular: Negative  Gastrointestinal: Positive for abdominal pain  Endocrine: Negative  Genitourinary: Positive for frequency  Musculoskeletal: Positive for back pain  Skin: Negative  Allergic/Immunologic: Negative  Neurological: Negative  Hematological: Negative  Psychiatric/Behavioral: Negative  Objective:     Physical Exam   Constitutional: She is oriented to person, place, and time  She appears well-developed and well-nourished  No distress  HENT:   Head: Normocephalic and atraumatic  Eyes: Conjunctivae are normal    Neck: Normal range of motion  Neck supple  Pulmonary/Chest: Effort normal and breath sounds normal    Abdominal: Soft  Bowel sounds are normal  She exhibits no mass  There is no tenderness  There is no CVA tenderness  No hernia  Hernia confirmed negative in the ventral area  Musculoskeletal: Normal range of motion  Neurological: She is alert and oriented to person, place, and time  Skin: Skin is warm and dry  Psychiatric: She has a normal mood and affect   Her behavior is normal  Judgment and thought content normal        05/12/2018:  RIGHT UPPER QUADRANT ULTRASOUND     INDICATION:     Right upper quadrant pain  Evaluate for cholecystitis      COMPARISON:  None     TECHNIQUE:   Real-time ultrasound of the right upper quadrant was performed with a curvilinear transducer with both volumetric sweeps and still imaging techniques      FINDINGS:     PANCREAS:  Visualized portions of the pancreas are within normal limits      AORTA AND IVC:  Visualized portions are normal for patient age      LIVER:  Size:  Enlarged  The liver measures 17 1 cm in the midclavicular line  Contour:  Surface contour is smooth  Parenchyma: There is mild diffuse increased echogenicity with smooth echotexture, without significant beam attenuation or loss of periportal echogenicity  Most consistent with mild hepatic steatosis  No evidence of suspicious mass  Limited imaging of the main portal vein shows it to be patent and hepatopetal      BILIARY:  Gallbladder is distended measuring 10 4 cm with sludge and stones  Thickened wall measuring 4 mm  No pericholecystic fluid  No sonographic Carver's sign  No intrahepatic biliary dilatation  CBD measures 5 mm  No choledocholithiasis      KIDNEY:   Right kidney measures 9 5 x 4 5 cm  Simple lower pole cyst measures 7 x 8 mm      ASCITES:   None      IMPRESSION:     Distended gallbladder with stones and sludge and wall thickening  No pericholecystic fluid  Negative Carevr's sign  Findings may represent acute cholecystitis in the appropriate clinical setting      Hepatomegaly with fatty infiltration

## 2018-05-17 ENCOUNTER — HOSPITAL ENCOUNTER (EMERGENCY)
Facility: HOSPITAL | Age: 83
Discharge: HOME | End: 2018-05-17
Attending: EMERGENCY MEDICINE
Payer: MEDICARE

## 2018-05-17 ENCOUNTER — APPOINTMENT (EMERGENCY)
Dept: RADIOLOGY | Facility: HOSPITAL | Age: 83
End: 2018-05-17
Payer: MEDICARE

## 2018-05-17 VITALS
OXYGEN SATURATION: 99 % | SYSTOLIC BLOOD PRESSURE: 178 MMHG | WEIGHT: 169.97 LBS | TEMPERATURE: 97.2 F | DIASTOLIC BLOOD PRESSURE: 84 MMHG | RESPIRATION RATE: 20 BRPM | HEART RATE: 59 BPM | BODY MASS INDEX: 30.11 KG/M2

## 2018-05-17 DIAGNOSIS — M54.50 LOW BACK PAIN: ICD-10-CM

## 2018-05-17 DIAGNOSIS — M25.551 RIGHT HIP PAIN: Primary | ICD-10-CM

## 2018-05-17 LAB
BACTERIA UR QL AUTO: ABNORMAL /HPF
BILIRUB UR QL STRIP: NEGATIVE
CLARITY UR: CLEAR
CLARITY, POC: CLEAR
COLOR UR: YELLOW
COLOR, POC: YELLOW
EXT BILIRUBIN, UA: NORMAL
EXT BLOOD URINE: NORMAL
EXT GLUCOSE, UA: NORMAL
EXT KETONES: NORMAL
EXT NITRITE, UA: NORMAL
EXT PH, UA: 6
EXT PROTEIN, UA: 100
EXT SPECIFIC GRAVITY, UA: 1.01
EXT UROBILINOGEN: 0.2
GLUCOSE UR STRIP-MCNC: NEGATIVE MG/DL
HGB UR QL STRIP.AUTO: NEGATIVE
KETONES UR STRIP-MCNC: NEGATIVE MG/DL
LEUKOCYTE ESTERASE UR QL STRIP: ABNORMAL
NITRITE UR QL STRIP: NEGATIVE
NON-SQ EPI CELLS URNS QL MICRO: ABNORMAL /HPF
PH UR STRIP.AUTO: 6 [PH] (ref 4.5–8)
PROT UR STRIP-MCNC: ABNORMAL MG/DL
RBC #/AREA URNS AUTO: ABNORMAL /HPF
SP GR UR STRIP.AUTO: 1.01 (ref 1–1.03)
UROBILINOGEN UR QL STRIP.AUTO: 0.2 E.U./DL
WBC # BLD EST: NORMAL 10*3/UL
WBC #/AREA URNS AUTO: ABNORMAL /HPF
WBC CLUMPS # UR AUTO: PRESENT /UL

## 2018-05-17 PROCEDURE — 81002 URINALYSIS NONAUTO W/O SCOPE: CPT | Performed by: PHYSICIAN ASSISTANT

## 2018-05-17 PROCEDURE — 81001 URINALYSIS AUTO W/SCOPE: CPT | Performed by: PHYSICIAN ASSISTANT

## 2018-05-17 PROCEDURE — 73502 X-RAY EXAM HIP UNI 2-3 VIEWS: CPT

## 2018-05-17 PROCEDURE — 72100 X-RAY EXAM L-S SPINE 2/3 VWS: CPT

## 2018-05-17 PROCEDURE — 99284 EMERGENCY DEPT VISIT MOD MDM: CPT

## 2018-05-17 RX ORDER — ACETAMINOPHEN 325 MG/1
650 TABLET ORAL ONCE
Status: COMPLETED | OUTPATIENT
Start: 2018-05-17 | End: 2018-05-17

## 2018-05-17 RX ORDER — METHOCARBAMOL 500 MG/1
500 TABLET, FILM COATED ORAL 2 TIMES DAILY
Qty: 15 TABLET | Refills: 0 | Status: SHIPPED | OUTPATIENT
Start: 2018-05-17 | End: 2018-10-24 | Stop reason: SDUPTHER

## 2018-05-17 RX ADMIN — ACETAMINOPHEN 650 MG: 325 TABLET, FILM COATED ORAL at 15:23

## 2018-05-17 NOTE — ED PROVIDER NOTES
History  Chief Complaint   Patient presents with    Back Pain     To ED with c/o right hip pain and low back pain since yesterday  Pt states that she is concerned that it is her "bad gallbladder" States that she is supposed to have surgery but "held off"     Patient presents to the ED with right hip and low back pain that started 5 days ago  Patient states she was admitted to the hospital and while inpatient she was trying to get into bed, but slipped and fell onto the floor on her buttocks  She states she had pain, but it resolved and then 5 days ago the pain returned  No radiation of pain  No numbness/tingling  Patient took tylenol at 4AM which helped with the pain  NO bowel/bladder dysfunction  Pain is worse with movement  Patient is scheduled for cholecystectomy 5/29 at [facility] by Dr Joselo Guillaume  SHe does not currently have abdominal pain or tenderness  History provided by:  Patient  Back Pain   Location:  Lumbar spine and gluteal region  Quality:  Aching  Radiates to:  Does not radiate  Pain severity:  Moderate  Onset quality:  Gradual  Duration:  5 days  Timing:  Constant  Progression:  Worsening  Chronicity:  New  Context: falling    Relieved by: tylenol  Worsened by: Movement  Associated symptoms: no abdominal pain, no abdominal swelling, no bladder incontinence, no bowel incontinence, no chest pain, no dysuria, no fever, no headaches, no leg pain, no numbness, no paresthesias, no pelvic pain, no perianal numbness and no weakness        Prior to Admission Medications   Prescriptions Last Dose Informant Patient Reported? Taking?    Cyanocobalamin (B-12) 1000 MCG CAPS  Self Yes No   Sig: Take 1 tablet by mouth daily   Linagliptin (TRADJENTA) 5 MG TABS  Self Yes No   Sig: Take 1 tablet by mouth daily   aspirin 81 MG tablet  Self Yes No   Sig: Take 1 tablet by mouth daily   clopidogrel (PLAVIX) 75 mg tablet  Self Yes No   Sig: Take 1 tablet by mouth daily   diclofenac sodium (VOLTAREN) 1 %  Self Yes No   Sig: Place on the skin   docusate sodium (COLACE) 100 mg capsule  Self Yes No   Sig: Take 1 capsule by mouth 2 (two) times a day   flurandrenolide (CORDRAN) 0 05 % lotion  Self Yes No   Sig: Apply topically 2 (two) times a day   furosemide (LASIX) 20 mg tablet  Self Yes No   Sig: Take 1 tablet by mouth daily   glipiZIDE (GLUCOTROL XL) 10 mg 24 hr tablet  Self Yes No   Sig: Take 1 tablet by mouth daily   levothyroxine 75 mcg tablet  Self Yes No   Sig: Take 1 tablet by mouth daily   losartan (COZAAR) 100 MG tablet  Self Yes No   Sig: Take 1 tablet by mouth daily   magnesium chloride (MAG-SR) 535 mg  Self Yes No   Sig: Take 1 tablet by mouth daily   metFORMIN (GLUCOPHAGE-XR) 750 mg 24 hr tablet  Self Yes No   Sig: Take 1 tablet by mouth daily   methocarbamol (ROBAXIN) 500 mg tablet  Self Yes No   Sig: Take 1 tablet by mouth every 6 (six) hours as needed   metoprolol succinate (TOPROL-XL) 25 mg 24 hr tablet  Self Yes No   Sig: Take 1 tablet by mouth daily   omeprazole (PRILOSEC) 20 mg delayed release capsule  Self Yes No   Sig: Take 1 capsule by mouth daily   repaglinide (PRANDIN) 0 5 mg tablet  Self Yes No   Sig: Take 1 tablet by mouth every 8 (eight) hours   simvastatin (ZOCOR) 40 mg tablet  Self Yes No   Sig: Take 1 tablet by mouth daily   solifenacin (VESICARE) 5 mg tablet  Self Yes No   Sig: Take 1 tablet by mouth daily      Facility-Administered Medications: None       Past Medical History:   Diagnosis Date    Acquired absence of kidney     Frequency of micturition     Malignant neoplasm of left kidney, except renal pelvis (HCC)     Nocturia     Personal history of other malignant neoplasm of kidney     Renal mass, left     UTI (urinary tract infection)        Past Surgical History:   Procedure Laterality Date    RADICAL NEPHRECTOMY Left 2013       Family History   Problem Relation Age of Onset    Diabetes Father     Heart failure Brother      I have reviewed and agree with the history as documented  Social History   Substance Use Topics    Smoking status: Never Smoker    Smokeless tobacco: Never Used    Alcohol use No        Review of Systems   Constitutional: Negative for chills and fever  Cardiovascular: Negative for chest pain  Gastrointestinal: Positive for nausea  Negative for abdominal pain, bowel incontinence, diarrhea and vomiting  Genitourinary: Negative for bladder incontinence, dysuria, pelvic pain and urgency  Musculoskeletal: Positive for back pain  Negative for neck pain  Skin: Negative for color change and rash  Neurological: Negative for weakness, numbness, headaches and paresthesias  Psychiatric/Behavioral: Negative for confusion  All other systems reviewed and are negative  Physical Exam  ED Triage Vitals [05/17/18 1517]   Temperature Pulse Respirations Blood Pressure SpO2   (!) 97 2 °F (36 2 °C) 77 20 145/60 99 %      Temp Source Heart Rate Source Patient Position - Orthostatic VS BP Location FiO2 (%)   Tympanic Monitor Sitting Right arm --      Pain Score       5           Orthostatic Vital Signs  Vitals:    05/17/18 1517 05/17/18 1545 05/17/18 1600   BP: 145/60 (!) 186/88 (!) 177/77   Pulse: 77 61 60   Patient Position - Orthostatic VS: Sitting         Physical Exam   Constitutional: She is oriented to person, place, and time  She appears well-developed and well-nourished  HENT:   Head: Normocephalic and atraumatic  Eyes: Conjunctivae are normal    Neck: Normal range of motion  Cardiovascular: Normal rate, regular rhythm and normal heart sounds  Pulses:       Dorsalis pedis pulses are 2+ on the right side, and 2+ on the left side  Pulmonary/Chest: Effort normal and breath sounds normal    Abdominal: Soft  Bowel sounds are normal  She exhibits no distension  There is no tenderness  Musculoskeletal:        Lumbar back: She exhibits tenderness and bony tenderness  She exhibits normal range of motion, no swelling and no deformity  Back:    Neurological: She is alert and oriented to person, place, and time  She has normal strength  No sensory deficit  Skin: Skin is warm and dry  No bruising and no rash noted  She is not diaphoretic  No pallor  Psychiatric: She has a normal mood and affect  Nursing note and vitals reviewed  ED Medications  Medications   acetaminophen (TYLENOL) tablet 650 mg (650 mg Oral Given 5/17/18 1523)       Diagnostic Studies  Results Reviewed     Procedure Component Value Units Date/Time    UA w Reflex to Microscopic w Reflex to Culture [[id number]]  (Abnormal) Collected:  05/17/18 1654    Lab Status:  Final result Specimen:  Urine from Urine, Clean Catch Updated:  05/17/18 1701     Color, UA Yellow     Clarity, UA Clear     Specific Gravity, UA 1 015     pH, UA 6 0     Leukocytes, UA Small (A)     Nitrite, UA Negative     Protein, UA 30 (1+) (A) mg/dl      Glucose, UA Negative mg/dl      Ketones, UA Negative mg/dl      Urobilinogen, UA 0 2 E U /dl      Bilirubin, UA Negative     Blood, UA Negative    Urine Microscopic [[id number]] Collected:  05/17/18 1654    Lab Status: In process Specimen:  Urine from Urine, Clean Catch Updated:  05/17/18 1701    POCT urinalysis dipstick [[id number]]  (Normal) Resulted:  05/17/18 1634    Lab Status:  Edited Result - FINAL Specimen:  Urine Updated:  05/17/18 1652     Color, UA Yellow     Clarity, UA Clear     EXT Glucose, UA (Ref: Negative) Neg     EXT Bilirubin, UA (Ref: Negative) Neg     EXT Ketones, UA (Ref: Negative) Neg     EXT Spec Grav, UA 1 010     EXT Blood, UA (Ref: Negative) Neg     EXT pH, UA 6 0     EXT Protein, UA (Ref: Negative) 100     EXT Urobilinogen, UA (Ref: 0 2- 1 0) 0 2     EXT Leukocytes, UA (Ref: Negative) Small     EXT Nitrite, UA (Ref: Negative) Neg                 XR lumbar spine 2 or 3 views   Final Result by Jono Jaime MD (05/17 1621)      No acute osseous abnormality           Workstation performed: [id number]         XR hip/pelv 2-3 vws right Final Result by Jono Jaime MD (05/17 1619)      No acute osseous abnormality  Workstation performed: [id number]                    Procedures  Procedures       Phone Contacts  ED Phone Contact    ED Course                               MDM  Number of Diagnoses or Management Options  Low back pain: new and requires workup  Right hip pain: new and requires workup  Diagnosis management comments: Patient with right hip pain and low back pain x 5 days, had a fall last month  Will xray to r/o fracture  Pain worse with movement  Patient has no abdominal tenderness or pain, no fevers  SHe is scheduled for cholecystectomy 5/29  Patient instructed to f/u with PCP for elevated BP  Amount and/or Complexity of Data Reviewed  Tests in the radiology section of CPT®: ordered and reviewed    Patient Progress  Patient progress: stable    CritCare Time    Disposition  Final diagnoses:   Right hip pain   Low back pain     Time reflects when diagnosis was documented in both MDM as applicable and the Disposition within this note     Time User Action Codes Description Comment    5/17/2018  4:52 PM Siria Mcgovern Add [M25 551] Right hip pain     5/17/2018  4:52 PM Siria Azul [M54 5] Low back pain       ED Disposition     ED Disposition Condition Comment    Discharge  Shannon Mahajan discharge to home/self care  Condition at discharge: Stable        Follow-up Information     Follow up With Specialties Details Why Contact Info    Pearl Silva MD Internal Medicine In 1 day For recheck of blood pressure [address] [facility] [address]  [facility] [address] [phone]          Patient's Medications   Discharge Prescriptions    METHOCARBAMOL (ROBAXIN) 500 MG TABLET    Take 1 tablet (500 mg total) by mouth 2 (two) times a day       Start Date: 5/17/2018 End Date: --       Order Dose: 500 mg       Quantity: 15 tablet    Refills: 0     No discharge procedures on file      ED Provider  Electronically Signed by           Iain George PA-C  05/17/18 9156

## 2018-05-17 NOTE — DISCHARGE INSTRUCTIONS
Rest, ice to back  Take tylenol for discomfort, robaxin for severe pain  Follow up with family doctor tomorrow for recheck of blood pressure  Acute Low Back Pain   WHAT YOU NEED TO KNOW:   Acute low back pain is sudden discomfort in your lower back area that lasts for up to 6 weeks  The discomfort makes it difficult to tolerate activity  DISCHARGE INSTRUCTIONS:   Return to the emergency department if:   · You have severe pain  · You have sudden stiffness and heaviness on both buttocks down to both legs  · You have numbness or weakness in one leg, or pain in both legs  · You have numbness in your genital area or across your lower back  · You cannot control your urine or bowel movements  Contact your healthcare provider if:   · You have a fever  · You have pain at night or when you rest     · Your pain does not get better with treatment  · You have pain that worsens when you cough or sneeze  · You suddenly feel something pop or snap in your back  · You have questions or concerns about your condition or care  Medicines: The following medicines may be ordered by your healthcare provider:  · Acetaminophen  decreases pain  It is available without a doctor's order  Ask how much to take and how often to take it  Follow directions  Acetaminophen can cause liver damage if not taken correctly  · NSAIDs  help decrease swelling and pain  This medicine is available with or without a doctor's order  NSAIDs can cause stomach bleeding or kidney problems in certain people  If you take blood thinner medicine, always ask your healthcare provider if NSAIDs are safe for you  Always read the medicine label and follow directions  · Prescription pain medicine  may be given  Ask your healthcare provider how to take this medicine safely  · Muscle relaxers  decrease pain by relaxing the muscles in your lower spine  · Take your medicine as directed    Contact your healthcare provider if you think your medicine is not helping or if you have side effects  Tell him of her if you are allergic to any medicine  Keep a list of the medicines, vitamins, and herbs you take  Include the amounts, and when and why you take them  Bring the list or the pill bottles to follow-up visits  Carry your medicine list with you in case of an emergency  Self-care:   · Stay active  as much as you can without causing more pain  Bed rest could make your back pain worse  Start with some light exercises such as walking  Avoid heavy lifting until your pain is gone  Ask for more information about the activities or exercises that are right for you  · Ice  helps decrease swelling, pain, and muscle spams  Put crushed ice in a plastic bag  Cover it with a towel  Place it on your lower back for 20 to 30 minutes every 2 hours  Do this for about 2 to 3 days after your pain starts, or as directed  · Heat  helps decrease pain and muscle spasms  Start to use heat after treatment with ice has stopped  Use a small towel dampened with warm water or a heating pad, or sit in a warm bath  Apply heat on the area for 20 to 30 minutes every 2 hours for as many days as directed  Alternate heat and ice  Prevent acute low back pain:   · Use proper body mechanics  ¨ Bend at the hips and knees when you  objects  Do not bend from the waist  Use your leg muscles as you lift the load  Do not use your back  Keep the object close to your chest as you lift it  Try not to twist or lift anything above your waist     ¨ Change your position often when you stand for long periods of time  Rest one foot on a small box or footrest, and then switch to the other foot often  ¨ Try not to sit for long periods of time  When you do, sit in a straight-backed chair with your feet flat on the floor  Never reach, pull, or push while you are sitting  · Do exercises that strengthen your back muscles  Warm up before you exercise   Ask your healthcare provider the best exercises for you  · Maintain a healthy weight  Ask your healthcare provider how much you should weigh  Ask him to help you create a weight loss plan if you are overweight  Follow up with your healthcare provider as directed:  Return for a follow-up visit if you still have pain after 1 to 3 weeks of treatment  You may need to visit an orthopedist if your back pain lasts more than 12 weeks  Write down your questions so you remember to ask them during your visits  © 2017 [facility] Information is for End User's use only and may not be sold, redistributed or otherwise used for commercial purposes  All illustrations and images included in CareNotes® are the copyrighted property of A D A M , Inc  or [employer]  The above information is an  only  It is not intended as medical advice for individual conditions or treatments  Talk to your doctor, nurse or pharmacist before following any medical regimen to see if it is safe and effective for you  Hip Pain   WHAT YOU NEED TO KNOW:   What causes hip pain? Hip pain can be caused by a number of conditions, such as bursitis, arthritis, or muscle or tendon strain  You may have swelling in the fluid-filled sacs that protect your muscles and tendons  Hip pain can also be caused by a lower back problem  Hip pain may be caused by trauma, playing sports, or running  Your pain may start in your hip and go to your thigh, buttock, or groin  How is hip pain treated? You may need x-rays to make sure there are no broken bones  You may be given NSAIDs to help decrease pain and swelling  How can I manage hip pain? · Rest  your injured hip so that it can heal  You may need to avoid putting any weight on your hip for at least 48 hours  Return to normal activities as directed  · Ice  the injury for 20 minutes every 4 hours, or as directed  Use an ice pack, or put crushed ice in a plastic bag   Cover it with a towel to protect your skin  Ice helps prevent tissue damage and decreases swelling and pain  · Elevate  your injured hip above the level of your heart as often as you can  This will help decrease swelling and pain  If possible, prop your hip and leg on pillows or blankets to keep the area elevated comfortably  · Maintain a healthy weight  Extra body weight can cause pressure and pain in your hip, knee, and ankle joints  Ask your healthcare provider how much you should weigh  Ask him to help you create a weight loss plan if you are overweight  · Use assistive devices as directed  You may need to use a cane or crutches  Assistive devices help decrease pain and pressure on your hip when you walk  Ask your healthcare provider for more information about assistive devices and how to use them correctly  When should I seek immediate care? · Your pain gets worse  · You have numbness in your leg or toes  · You cannot put any weight on or move your hip  When should I contact my healthcare provider? · You have a fever  · Your pain does not decrease, even after treatment  · You have questions or concerns about your condition or care  CARE AGREEMENT:   You have the right to help plan your care  Learn about your health condition and how it may be treated  Discuss treatment options with your caregivers to decide what care you want to receive  You always have the right to refuse treatment  The above information is an  only  It is not intended as medical advice for individual conditions or treatments  Talk to your doctor, nurse or pharmacist before following any medical regimen to see if it is safe and effective for you  © 2017 [address] Information is for End User's use only and may not be sold, redistributed or otherwise used for commercial purposes   All illustrations and images included in CareNotes® are the copyrighted property of [employer]  or [facility] [employer]

## 2018-05-17 NOTE — ED NOTES
Patient transported to [facility], [address], [address] [place] [address]  05/17/18 [id number]

## 2018-05-29 ENCOUNTER — OFFICE VISIT (OUTPATIENT)
Dept: SURGERY | Facility: MEDICAL CENTER | Age: 83
End: 2018-05-29
Payer: MEDICARE

## 2018-05-29 VITALS
SYSTOLIC BLOOD PRESSURE: 120 MMHG | BODY MASS INDEX: 29.41 KG/M2 | WEIGHT: 166 LBS | HEIGHT: 63 IN | DIASTOLIC BLOOD PRESSURE: 84 MMHG

## 2018-05-29 DIAGNOSIS — K80.20 CALCULUS OF GALLBLADDER WITHOUT CHOLECYSTITIS WITHOUT OBSTRUCTION: Primary | ICD-10-CM

## 2018-05-29 PROCEDURE — 99213 OFFICE O/P EST LOW 20 MIN: CPT | Performed by: SURGERY

## 2018-05-29 NOTE — PROGRESS NOTES
Assessment/Plan: Geno Sheets is an 80year old female who presents today for follow-up from the Emergency Department visit where she presented with right upper quadrant pain  The ultrasound on 5/12/18 showed gallstones  Besides the 1 episode she has not had any further episodes of pain  She was advised to monitor the symptoms for now and if the symptoms continue, we will discuss the possibility of a cholecystectomy  Discussed risks, benefits, and alternatives to cholecystectomy  Explained the procedure, as well as pre- and post procedural protocol, diet, and restrictions  She decided to continue with conservative management for now  She should call if symptoms continue or worsen and if she has any questions or concerns  No problem-specific Assessment & Plan notes found for this encounter  Diagnoses and all orders for this visit:    Calculus of gallbladder without cholecystitis without obstruction          Subjective:      Patient ID: Geno Sheets is a 80 y o  female  Geno Sheets is an 80year old female who presents today with right side abdominal pain  She reports the pain was severe and lasted about an hour  She went to the ED on 5/17/18 for this pain  The ultrasound on 5/12/18 which showed gallstones  She inquired about surgery  She has not had pain since  The following portions of the patient's history were reviewed and updated as appropriate: allergies, current medications, past family history, past medical history, past social history, past surgical history and problem list     Review of Systems      Objective:      /84   Ht 5' 3" (1 6 m)   Wt 75 3 kg (166 lb)   BMI 29 41 kg/m²          Physical Exam   Abdominal:   Right abdominal pain  By signing my name below, Esteban Enrique, [email or web address] that this documentation has been prepared under the direction and in the presence of Austin Rucker MD  Electronically Signed: Miky Oates, 5/29/2018       SANJIV Lori Reese, personally performed the services described in this documentation  All medical record entries made by the scribe were at my direction and in my presence  I have reviewed the chart and discharge instructions and agree that the record reflects my personal performance and is accurate and complete  Lori Reese MD  5/15/2018

## 2018-05-29 NOTE — LETTER
May 29, 2018     Simon Cash MD  [address] [place] [address]    Patient: Jim Gallardo   YOB: 1932   Date of Visit: 5/29/2018       Dear Dr Tigist Kaye:    Thank you for referring Jim Gallardo to me for evaluation  Below are my notes for this consultation  If you have questions, please do not hesitate to call me  I look forward to following your patient along with you           Sincerely,        Adin Allison MD        CC: No Recipients

## 2018-10-24 ENCOUNTER — OFFICE VISIT (OUTPATIENT)
Dept: UROLOGY | Facility: MEDICAL CENTER | Age: 83
End: 2018-10-24
Payer: MEDICARE

## 2018-10-24 VITALS
WEIGHT: 167 LBS | BODY MASS INDEX: 29.59 KG/M2 | HEIGHT: 63 IN | SYSTOLIC BLOOD PRESSURE: 98 MMHG | DIASTOLIC BLOOD PRESSURE: 64 MMHG

## 2018-10-24 DIAGNOSIS — R35.0 FREQUENCY OF URINATION: ICD-10-CM

## 2018-10-24 DIAGNOSIS — Z90.5 HISTORY OF NEPHRECTOMY: ICD-10-CM

## 2018-10-24 DIAGNOSIS — Z85.528 HISTORY OF RENAL CELL CANCER: Primary | ICD-10-CM

## 2018-10-24 LAB
SL AMB  POCT GLUCOSE, UA: ABNORMAL
SL AMB LEUKOCYTE ESTERASE,UA: ABNORMAL
SL AMB POCT BILIRUBIN,UA: ABNORMAL
SL AMB POCT BLOOD,UA: ABNORMAL
SL AMB POCT CLARITY,UA: ABNORMAL
SL AMB POCT COLOR,UA: ABNORMAL
SL AMB POCT KETONES,UA: ABNORMAL
SL AMB POCT NITRITE,UA: ABNORMAL
SL AMB POCT PH,UA: 5.5
SL AMB POCT SPECIFIC GRAVITY,UA: 1.02
SL AMB POCT URINE PROTEIN: 100
SL AMB POCT UROBILINOGEN: 0.2

## 2018-10-24 PROCEDURE — 99214 OFFICE O/P EST MOD 30 MIN: CPT | Performed by: UROLOGY

## 2018-10-24 PROCEDURE — 81003 URINALYSIS AUTO W/O SCOPE: CPT | Performed by: UROLOGY

## 2018-10-24 NOTE — PROGRESS NOTES
Assessment/Plan:      Diagnoses and all orders for this visit:    History of renal cell cancer  -     POCT urine dip auto non-scope    History of nephrectomy    Frequency of urination          Subjective:  No complaints     Patient ID: Tam Cormier is a 80 y o  female  HPI  She is now 5-1/2 years after her laparoscopic robotic left radical nephrectomy for stage pT1 RCC  Only urologic complaint is that of baseline frequency, not an issue  No hematuria, flank or abdominal pains to report  She states her appetite is good and her bowel function is normal   She denies any pains in new locations, or weight loss               Review of Systems   Constitutional: Negative  HENT: Negative  Eyes: Positive for visual disturbance  Respiratory: Negative  Cardiovascular: Positive for leg swelling  Gastrointestinal: Negative  Endocrine: Negative  Genitourinary: Positive for frequency  Musculoskeletal: Positive for arthralgias and back pain  Skin: Positive for rash  Allergic/Immunologic: Negative  Neurological: Negative  Hematological: Negative  Psychiatric/Behavioral: Negative  Objective:     Physical Exam   Constitutional: She is oriented to person, place, and time  She appears well-developed and well-nourished  No distress  HENT:   Head: Normocephalic and atraumatic  Nose: Nose normal    Mouth/Throat: Oropharynx is clear and moist    Eyes: Pupils are equal, round, and reactive to light  Conjunctivae and EOM are normal  No scleral icterus  Neck: Normal range of motion  Neck supple  Cardiovascular: Normal rate, regular rhythm, normal heart sounds and intact distal pulses  No murmur heard  Pulmonary/Chest: Effort normal and breath sounds normal  No respiratory distress  She has no wheezes  She has no rales  Abdominal: Soft  Bowel sounds are normal  She exhibits no distension and no mass  There is no tenderness  Musculoskeletal: Normal range of motion   She exhibits no edema or tenderness  Lymphadenopathy:     She has no cervical adenopathy  Neurological: She is alert and oriented to person, place, and time  No cranial nerve deficit  Skin: Skin is warm and dry  No rash noted  No erythema  No pallor  Psychiatric: She has a normal mood and affect  Her behavior is normal  Judgment and thought content normal    Nursing note and vitals reviewed

## 2018-12-05 ENCOUNTER — OFFICE VISIT (OUTPATIENT)
Dept: URGENT CARE | Facility: CLINIC | Age: 83
End: 2018-12-05
Payer: MEDICARE

## 2018-12-05 VITALS
HEIGHT: 62 IN | SYSTOLIC BLOOD PRESSURE: 138 MMHG | RESPIRATION RATE: 20 BRPM | BODY MASS INDEX: 30.18 KG/M2 | DIASTOLIC BLOOD PRESSURE: 60 MMHG | OXYGEN SATURATION: 100 % | WEIGHT: 164 LBS | TEMPERATURE: 97.4 F | HEART RATE: 68 BPM

## 2018-12-05 DIAGNOSIS — Z79.01 ON CONTINUOUS ORAL ANTICOAGULATION: ICD-10-CM

## 2018-12-05 DIAGNOSIS — S39.92XA BACK INJURY, INITIAL ENCOUNTER: ICD-10-CM
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
## 2018-12-05 PROCEDURE — G0463 HOSPITAL OUTPT CLINIC VISIT: HCPCS | Performed by: PHYSICIAN ASSISTANT

## 2018-12-05 PROCEDURE — 99203 OFFICE O/P NEW LOW 30 MIN: CPT | Performed by: PHYSICIAN ASSISTANT

## 2018-12-05 NOTE — PROGRESS NOTES
[address] [facility]        NAME: Lliiana Rueda is a 80 y o  female  : 1/3/1932    MRN: [id number]  DATE: 2018  TIME: 4:18 PM    Assessment and Plan   Fall, initial encounter [W19  XXXA]  1  Fall, initial encounter     2  Back injury, initial encounter     3  On continuous oral anticoagulation       Recommend ER transfer given pt is on ASA and Plavix and c/o back pain that radiated to chest   She is also limited with ambulation and lives alone  She attempted to call for a ride but was unable to find anyone so an ambulance was called for transport  It is unsafe for her to drive herself to the ER, if symptoms worsen she could get in an accident  Pt agrees to ambulance transport  Patient Instructions   Patient Instructions   Pt went to ER via EMS         Proceed to  ER if symptoms worsen  Chief Complaint     Chief Complaint   Patient presents with    Fall     S/P FALL ~ 1 HOUR AGO @ [facility]; No LOC, "lost my balance, landed on my left side and back"    Back Pain     left side, "below bra strap"     Abdominal Pain     states pain radiates through to epigastric region         History of Present Illness       79 y/o female on chronic anticoagulation therapy(ASA and plavix) presents for evaluation of back pain following a fall that occurred about one hour ago  She reports she was at lunch with a friend and lost her balance at the counter and fell onto her left side and then onto her back  She denies hitting her head  She reports she thought she broke her back and had to have 2 men help her off the ground  They wanted to call an ambulance but she refused because she had to drive her friend home  She then drove herself to urgent care  She reports she has pain in the middle of her back that was radiating into her chest and upper abdomen  She denies sob, headache, dizziness  She had difficulty transitioning from the wheelchair to the toilet            Review of Systems   Review of Systems   Constitutional: Negative  HENT: Negative  Respiratory: Positive for shortness of breath  Cardiovascular: Positive for chest pain  Gastrointestinal: Negative  Genitourinary: Negative  Musculoskeletal: Positive for back pain  Skin: Negative  Neurological: Negative            Current Medications       Current Outpatient Prescriptions:     aspirin 81 MG tablet, Take 1 tablet by mouth daily, Disp: , Rfl:     clopidogrel (PLAVIX) 75 mg tablet, Take 1 tablet by mouth daily, Disp: , Rfl:     Cyanocobalamin (B-12) 1000 MCG CAPS, Take 1 tablet by mouth daily, Disp: , Rfl:     diclofenac sodium (VOLTAREN) 1 %, Place on the skin, Disp: , Rfl:     docusate sodium (COLACE) 100 mg capsule, Take 1 capsule by mouth 2 (two) times a day, Disp: , Rfl:     flurandrenolide (CORDRAN) 0 05 % lotion, Apply topically 2 (two) times a day, Disp: , Rfl:     furosemide (LASIX) 20 mg tablet, Take 1 tablet by mouth daily, Disp: , Rfl:     glipiZIDE (GLUCOTROL XL) 10 mg 24 hr tablet, Take 1 tablet by mouth daily, Disp: , Rfl:     levothyroxine 75 mcg tablet, Take 1 tablet by mouth daily, Disp: , Rfl:     Linagliptin (TRADJENTA) 5 MG TABS, Take 1 tablet by mouth daily, Disp: , Rfl:     losartan (COZAAR) 100 MG tablet, Take 1 tablet by mouth daily, Disp: , Rfl:     magnesium chloride (MAG-SR) 535 mg, Take 1 tablet by mouth daily, Disp: , Rfl:     metFORMIN (GLUCOPHAGE-XR) 750 mg 24 hr tablet, Take 1 tablet by mouth daily, Disp: , Rfl:     methocarbamol (ROBAXIN) 500 mg tablet, Take 1 tablet by mouth every 6 (six) hours as needed, Disp: , Rfl:     metoprolol succinate (TOPROL-XL) 25 mg 24 hr tablet, Take 1 tablet by mouth daily, Disp: , Rfl:     omeprazole (PRILOSEC) 20 mg delayed release capsule, Take 1 capsule by mouth daily, Disp: , Rfl:     repaglinide (PRANDIN) 0 5 mg tablet, Take 1 tablet by mouth every 8 (eight) hours, Disp: , Rfl:     simvastatin (ZOCOR) 40 mg tablet, Take 1 tablet by mouth daily, Disp: , Rfl:     solifenacin (VESICARE) 5 mg tablet, Take 1 tablet by mouth daily, Disp: , Rfl:     Current Allergies     Allergies as of 12/05/2018 - Reviewed 10/24/2018   Allergen Reaction Noted    Cephalexin  03/05/2014    Clindamycin  03/05/2014    Doxycycline  03/05/2014    Erythromycin base  11/02/2017    Lactate  11/02/2017    Levaquin [levofloxacin]  05/11/2018    Penicillins  03/05/2014    Procainamide  03/05/2014    Quinidine (non-therapeutic)  03/05/2014    Sulfa antibiotics  11/02/2017            The following portions of the patient's history were reviewed and updated as appropriate: allergies, current medications, past family history, past medical history, past social history, past surgical history and problem list      Past Medical History:   Diagnosis Date    Acquired absence of kidney     Frequency of micturition     Malignant neoplasm of left kidney, except renal pelvis (HCC)     Nocturia     Personal history of other malignant neoplasm of kidney     Renal mass, left     UTI (urinary tract infection)        Past Surgical History:   Procedure Laterality Date    RADICAL NEPHRECTOMY Left 2013       Family History   Problem Relation Age of Onset    Diabetes Father     Heart failure Brother          Medications have been verified  Objective   /60   Pulse 68   Temp (!) 97 4 °F (36 3 °C)   Resp 20   Ht 5' 2" (1 575 m)   Wt 74 4 kg (164 lb)   SpO2 100%   BMI 30 00 kg/m²        Physical Exam     Physical Exam   Constitutional: She is oriented to person, place, and time  No distress  HENT:   Head: Normocephalic and atraumatic  Eyes: Pupils are equal, round, and reactive to light  EOM are normal    Neck: Normal range of motion and full passive range of motion without pain  Cardiovascular: Normal rate and regular rhythm  Pulmonary/Chest: Breath sounds normal  She has no wheezes  She has no rhonchi  She has no rales  Abdominal: Soft  Normal appearance  Musculoskeletal:   Exam limited due to pt being unable to get onto exam table  Examined in wheelchair  ROM of upper extremities normal   Pain in back with hip flexion worse with right  TTP midline of spine mid thoracic level    Neurological: She is alert and oriented to person, place, and time  She has normal strength  No sensory deficit  Skin: Skin is warm and dry  No abrasion, no bruising and no ecchymosis noted

## 2019-04-15 ENCOUNTER — OFFICE VISIT (OUTPATIENT)
Dept: URGENT CARE | Facility: CLINIC | Age: 84
End: 2019-04-15
Payer: MEDICARE

## 2019-04-15 VITALS
WEIGHT: 170 LBS | BODY MASS INDEX: 30.12 KG/M2 | RESPIRATION RATE: 18 BRPM | HEIGHT: 63 IN | HEART RATE: 71 BPM | DIASTOLIC BLOOD PRESSURE: 76 MMHG | TEMPERATURE: 96.8 F | SYSTOLIC BLOOD PRESSURE: 186 MMHG | OXYGEN SATURATION: 98 %

## 2019-04-15 DIAGNOSIS — L08.9 ABRASION OF LEG WITH INFECTION, RIGHT, INITIAL ENCOUNTER: Primary | ICD-10-CM

## 2019-04-15 DIAGNOSIS — S80.811A ABRASION OF LEG WITH INFECTION, RIGHT, INITIAL ENCOUNTER: Primary | ICD-10-CM

## 2019-04-15 PROCEDURE — 99213 OFFICE O/P EST LOW 20 MIN: CPT | Performed by: PHYSICIAN ASSISTANT

## 2019-04-15 PROCEDURE — G0463 HOSPITAL OUTPT CLINIC VISIT: HCPCS | Performed by: PHYSICIAN ASSISTANT

## 2019-04-15 RX ORDER — GLIMEPIRIDE 4 MG/1
4 TABLET ORAL EVERY MORNING
Refills: 5 | COMMUNITY
Start: 2019-03-30 | End: 2019-09-16 | Stop reason: HOSPADM

## 2019-04-15 RX ORDER — LOSARTAN POTASSIUM 50 MG/1
50 TABLET ORAL DAILY
Refills: 11 | Status: ON HOLD | COMMUNITY
Start: 2019-03-29 | End: 2019-09-14 | Stop reason: CLARIF

## 2019-04-15 RX ORDER — LEVOTHYROXINE SODIUM 0.07 MG/1
TABLET ORAL
Status: ON HOLD | COMMUNITY
Start: 2019-02-11 | End: 2019-09-14 | Stop reason: CLARIF

## 2019-04-15 RX ORDER — METHOCARBAMOL 500 MG/1
500 TABLET, FILM COATED ORAL DAILY PRN
Status: ON HOLD | COMMUNITY
Start: 2018-05-24 | End: 2019-09-14 | Stop reason: CLARIF

## 2019-04-15 RX ORDER — FUROSEMIDE 20 MG/1
20 TABLET ORAL EVERY OTHER DAY
COMMUNITY
Start: 2018-11-27 | End: 2019-09-16 | Stop reason: HOSPADM

## 2019-04-15 RX ORDER — METFORMIN HYDROCHLORIDE 750 MG/1
1 TABLET, EXTENDED RELEASE ORAL
Status: ON HOLD | COMMUNITY
End: 2019-09-14 | Stop reason: CLARIF

## 2019-04-15 RX ORDER — BACITRACIN, NEOMYCIN, POLYMYXIN B 400; 3.5; 5 [USP'U]/G; MG/G; [USP'U]/G
1 OINTMENT TOPICAL ONCE
Status: COMPLETED | OUTPATIENT
Start: 2019-04-15 | End: 2019-04-15

## 2019-04-15 RX ORDER — TRIAMCINOLONE ACETONIDE 1 MG/G
CREAM TOPICAL
Status: ON HOLD | COMMUNITY
Start: 2018-10-25 | End: 2019-09-14 | Stop reason: CLARIF

## 2019-04-15 RX ORDER — METOPROLOL SUCCINATE 25 MG/1
TABLET, EXTENDED RELEASE ORAL
Status: ON HOLD | COMMUNITY
Start: 2019-02-11 | End: 2019-09-14 | Stop reason: CLARIF

## 2019-04-15 RX ORDER — MAGNESIUM CHLORIDE 64 MG
1 TABLET, DELAYED RELEASE (ENTERIC COATED) ORAL
COMMUNITY
End: 2022-01-01 | Stop reason: ALTCHOICE

## 2019-04-15 RX ORDER — SOLIFENACIN SUCCINATE 5 MG/1
1 TABLET, FILM COATED ORAL
COMMUNITY
Start: 2017-11-20 | End: 2019-04-24 | Stop reason: ALTCHOICE

## 2019-04-15 RX ORDER — SIMVASTATIN 40 MG
TABLET ORAL
Status: ON HOLD | COMMUNITY
Start: 2019-03-04 | End: 2019-09-14 | Stop reason: CLARIF

## 2019-04-15 RX ADMIN — BACITRACIN, NEOMYCIN, POLYMYXIN B 1 SMALL APPLICATION: 400; 3.5; 5 OINTMENT TOPICAL at 12:48

## 2019-04-24 ENCOUNTER — OFFICE VISIT (OUTPATIENT)
Dept: UROLOGY | Facility: MEDICAL CENTER | Age: 84
End: 2019-04-24
Payer: MEDICARE

## 2019-04-24 VITALS
SYSTOLIC BLOOD PRESSURE: 140 MMHG | HEIGHT: 63 IN | HEART RATE: 64 BPM | DIASTOLIC BLOOD PRESSURE: 78 MMHG | WEIGHT: 165 LBS | BODY MASS INDEX: 29.23 KG/M2

## 2019-04-24 DIAGNOSIS — Z85.528 HISTORY OF RENAL CELL CANCER: Primary | ICD-10-CM

## 2019-04-24 DIAGNOSIS — R35.0 URINARY FREQUENCY: ICD-10-CM

## 2019-04-24 DIAGNOSIS — Z90.5 HISTORY OF LEFT RADICAL NEPHRECTOMY: ICD-10-CM

## 2019-04-24 LAB
SL AMB  POCT GLUCOSE, UA: ABNORMAL
SL AMB LEUKOCYTE ESTERASE,UA: ABNORMAL
SL AMB POCT BILIRUBIN,UA: ABNORMAL
SL AMB POCT BLOOD,UA: ABNORMAL
SL AMB POCT CLARITY,UA: CLEAR
SL AMB POCT COLOR,UA: YELLOW
SL AMB POCT KETONES,UA: ABNORMAL
SL AMB POCT NITRITE,UA: ABNORMAL
SL AMB POCT PH,UA: 5.5
SL AMB POCT SPECIFIC GRAVITY,UA: 1.02
SL AMB POCT URINE PROTEIN: ABNORMAL
SL AMB POCT UROBILINOGEN: 0.2

## 2019-04-24 PROCEDURE — 81003 URINALYSIS AUTO W/O SCOPE: CPT | Performed by: UROLOGY

## 2019-04-24 PROCEDURE — 99214 OFFICE O/P EST MOD 30 MIN: CPT | Performed by: UROLOGY

## 2019-04-24 RX ORDER — OMEPRAZOLE/SODIUM BICARBONATE 20MG-1.1G
CAPSULE ORAL
Status: ON HOLD | COMMUNITY
End: 2019-09-14 | Stop reason: CLARIF

## 2019-09-13 ENCOUNTER — APPOINTMENT (EMERGENCY)
Dept: RADIOLOGY | Facility: HOSPITAL | Age: 84
DRG: 639 | End: 2019-09-13
Payer: MEDICARE

## 2019-09-13 ENCOUNTER — HOSPITAL ENCOUNTER (INPATIENT)
Facility: HOSPITAL | Age: 84
LOS: 3 days | Discharge: HOME HEALTH | DRG: 639 | End: 2019-09-16
Attending: EMERGENCY MEDICINE | Admitting: GENERAL PRACTICE
Payer: MEDICARE

## 2019-09-13 DIAGNOSIS — I10 HYPERTENSION, UNSPECIFIED TYPE: ICD-10-CM

## 2019-09-13 DIAGNOSIS — I49.8 BRADYARRHYTHMIA: ICD-10-CM

## 2019-09-13 DIAGNOSIS — R77.8 ELEVATED TROPONIN: ICD-10-CM

## 2019-09-13 DIAGNOSIS — R91.8 PULMONARY NODULES: ICD-10-CM

## 2019-09-13 DIAGNOSIS — R55 NEAR SYNCOPE: ICD-10-CM
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
## 2019-09-13 DIAGNOSIS — R42 LIGHTHEADEDNESS: ICD-10-CM

## 2019-09-13 PROBLEM — E78.5 HYPERLIPIDEMIA: Status: ACTIVE | Noted: 2019-09-13

## 2019-09-13 PROBLEM — N18.9 CKD (CHRONIC KIDNEY DISEASE): Status: ACTIVE | Noted: 2019-09-13

## 2019-09-13 PROBLEM — E03.9 HYPOTHYROIDISM: Status: ACTIVE | Noted: 2019-09-13

## 2019-09-13 LAB
ALBUMIN SERPL BCP-MCNC: 3.4 G/DL (ref 3.5–5)
ALP SERPL-CCNC: 76 U/L (ref 46–116)
ALT SERPL W P-5'-P-CCNC: 17 U/L (ref 12–78)
ANION GAP SERPL CALCULATED.3IONS-SCNC: 7 MMOL/L (ref 4–13)
AST SERPL W P-5'-P-CCNC: 23 U/L (ref 5–45)
BASOPHILS # BLD AUTO: 0.05 THOUSANDS/ΜL (ref 0–0.1)
BASOPHILS NFR BLD AUTO: 1 % (ref 0–1)
BILIRUB DIRECT SERPL-MCNC: 0.18 MG/DL (ref 0–0.2)
BILIRUB SERPL-MCNC: 0.61 MG/DL (ref 0.2–1)
BUN SERPL-MCNC: 34 MG/DL (ref 5–25)
CALCIUM SERPL-MCNC: 8.4 MG/DL (ref 8.3–10.1)
CHLORIDE SERPL-SCNC: 103 MMOL/L (ref 100–108)
CO2 SERPL-SCNC: 27 MMOL/L (ref 21–32)
CREAT SERPL-MCNC: 1.64 MG/DL (ref 0.6–1.3)
EOSINOPHIL # BLD AUTO: 0.09 THOUSAND/ΜL (ref 0–0.61)
EOSINOPHIL NFR BLD AUTO: 1 % (ref 0–6)
ERYTHROCYTE [DISTWIDTH] IN BLOOD BY AUTOMATED COUNT: 13.4 % (ref 11.6–15.1)
GFR SERPL CREATININE-BSD FRML MDRD: 28 ML/MIN/1.73SQ M
GLUCOSE SERPL-MCNC: 138 MG/DL (ref 65–140)
GLUCOSE SERPL-MCNC: 152 MG/DL (ref 65–140)
HCT VFR BLD AUTO: 37.6 % (ref 34.8–46.1)
HGB BLD-MCNC: 11.6 G/DL (ref 11.5–15.4)
IMM GRANULOCYTES # BLD AUTO: 0.03 THOUSAND/UL (ref 0–0.2)
IMM GRANULOCYTES NFR BLD AUTO: 0 % (ref 0–2)
LYMPHOCYTES # BLD AUTO: 1.64 THOUSANDS/ΜL (ref 0.6–4.47)
LYMPHOCYTES NFR BLD AUTO: 17 % (ref 14–44)
MAGNESIUM SERPL-MCNC: 1.2 MG/DL (ref 1.6–2.6)
MCH RBC QN AUTO: 30.3 PG (ref 26.8–34.3)
MCHC RBC AUTO-ENTMCNC: 30.9 G/DL (ref 31.4–37.4)
MCV RBC AUTO: 98 FL (ref 82–98)
MONOCYTES # BLD AUTO: 0.97 THOUSAND/ΜL (ref 0.17–1.22)
MONOCYTES NFR BLD AUTO: 10 % (ref 4–12)
NEUTROPHILS # BLD AUTO: 6.77 THOUSANDS/ΜL (ref 1.85–7.62)
NEUTS SEG NFR BLD AUTO: 71 % (ref 43–75)
NRBC BLD AUTO-RTO: 0 /100 WBCS
PLATELET # BLD AUTO: 227 THOUSANDS/UL (ref 149–390)
PMV BLD AUTO: 11 FL (ref 8.9–12.7)
POTASSIUM SERPL-SCNC: 4.1 MMOL/L (ref 3.5–5.3)
PROT SERPL-MCNC: 6.9 G/DL (ref 6.4–8.2)
RBC # BLD AUTO: 3.83 MILLION/UL (ref 3.81–5.12)
SODIUM SERPL-SCNC: 137 MMOL/L (ref 136–145)
TROPONIN I SERPL-MCNC: 0.38 NG/ML
TROPONIN I SERPL-MCNC: 0.47 NG/ML
TROPONIN I SERPL-MCNC: 0.54 NG/ML
WBC # BLD AUTO: 9.55 THOUSAND/UL (ref 4.31–10.16)

## 2019-09-13 PROCEDURE — 99285 EMERGENCY DEPT VISIT HI MDM: CPT | Performed by: EMERGENCY MEDICINE

## 2019-09-13 PROCEDURE — 73080 X-RAY EXAM OF ELBOW: CPT

## 2019-09-13 PROCEDURE — 93005 ELECTROCARDIOGRAM TRACING: CPT

## 2019-09-13 PROCEDURE — 80076 HEPATIC FUNCTION PANEL: CPT | Performed by: INTERNAL MEDICINE

## 2019-09-13 PROCEDURE — 85025 COMPLETE CBC W/AUTO DIFF WBC: CPT | Performed by: EMERGENCY MEDICINE

## 2019-09-13 PROCEDURE — 36415 COLL VENOUS BLD VENIPUNCTURE: CPT | Performed by: EMERGENCY MEDICINE

## 2019-09-13 PROCEDURE — 73090 X-RAY EXAM OF FOREARM: CPT

## 2019-09-13 PROCEDURE — 83735 ASSAY OF MAGNESIUM: CPT | Performed by: EMERGENCY MEDICINE

## 2019-09-13 PROCEDURE — 82948 REAGENT STRIP/BLOOD GLUCOSE: CPT

## 2019-09-13 PROCEDURE — 99285 EMERGENCY DEPT VISIT HI MDM: CPT

## 2019-09-13 PROCEDURE — 84484 ASSAY OF TROPONIN QUANT: CPT | Performed by: EMERGENCY MEDICINE

## 2019-09-13 PROCEDURE — 72128 CT CHEST SPINE W/O DYE: CPT

## 2019-09-13 PROCEDURE — 80048 BASIC METABOLIC PNL TOTAL CA: CPT | Performed by: EMERGENCY MEDICINE

## 2019-09-13 PROCEDURE — 73110 X-RAY EXAM OF WRIST: CPT

## 2019-09-13 PROCEDURE — 99223 1ST HOSP IP/OBS HIGH 75: CPT | Performed by: INTERNAL MEDICINE

## 2019-09-13 RX ORDER — FUROSEMIDE 20 MG/1
20 TABLET ORAL DAILY
Status: DISCONTINUED | OUTPATIENT
Start: 2019-09-14 | End: 2019-09-14

## 2019-09-13 RX ORDER — PRAVASTATIN SODIUM 80 MG/1
80 TABLET ORAL
Status: DISCONTINUED | OUTPATIENT
Start: 2019-09-14 | End: 2019-09-16 | Stop reason: HOSPADM

## 2019-09-13 RX ORDER — METHOCARBAMOL 500 MG/1
500 TABLET, FILM COATED ORAL EVERY 6 HOURS PRN
Status: DISCONTINUED | OUTPATIENT
Start: 2019-09-13 | End: 2019-09-14

## 2019-09-13 RX ORDER — ACETAMINOPHEN 325 MG/1
650 TABLET ORAL EVERY 4 HOURS PRN
Status: DISCONTINUED | OUTPATIENT
Start: 2019-09-13 | End: 2019-09-16 | Stop reason: HOSPADM

## 2019-09-13 RX ORDER — DOCUSATE SODIUM 100 MG/1
100 CAPSULE, LIQUID FILLED ORAL 2 TIMES DAILY
Status: DISCONTINUED | OUTPATIENT
Start: 2019-09-13 | End: 2019-09-16 | Stop reason: HOSPADM

## 2019-09-13 RX ORDER — ACETAMINOPHEN 325 MG/1
975 TABLET ORAL ONCE
Status: COMPLETED | OUTPATIENT
Start: 2019-09-13 | End: 2019-09-13

## 2019-09-13 RX ORDER — PANTOPRAZOLE SODIUM 20 MG/1
20 TABLET, DELAYED RELEASE ORAL
Status: DISCONTINUED | OUTPATIENT
Start: 2019-09-14 | End: 2019-09-16 | Stop reason: HOSPADM

## 2019-09-13 RX ORDER — ONDANSETRON 2 MG/ML
4 INJECTION INTRAMUSCULAR; INTRAVENOUS EVERY 6 HOURS PRN
Status: DISCONTINUED | OUTPATIENT
Start: 2019-09-13 | End: 2019-09-16 | Stop reason: HOSPADM

## 2019-09-13 RX ORDER — ASPIRIN 81 MG/1
81 TABLET ORAL DAILY
Status: DISCONTINUED | OUTPATIENT
Start: 2019-09-14 | End: 2019-09-16 | Stop reason: HOSPADM

## 2019-09-13 RX ORDER — LOSARTAN POTASSIUM 50 MG/1
50 TABLET ORAL DAILY
Status: DISCONTINUED | OUTPATIENT
Start: 2019-09-14 | End: 2019-09-14

## 2019-09-13 RX ORDER — SENNOSIDES 8.6 MG
1 TABLET ORAL DAILY
Status: DISCONTINUED | OUTPATIENT
Start: 2019-09-14 | End: 2019-09-16 | Stop reason: HOSPADM

## 2019-09-13 RX ORDER — CLOPIDOGREL BISULFATE 75 MG/1
75 TABLET ORAL DAILY
Status: DISCONTINUED | OUTPATIENT
Start: 2019-09-14 | End: 2019-09-14

## 2019-09-13 RX ORDER — OXYCODONE HYDROCHLORIDE 5 MG/1
5 TABLET ORAL ONCE
Status: COMPLETED | OUTPATIENT
Start: 2019-09-13 | End: 2019-09-13

## 2019-09-13 RX ORDER — MAGNESIUM HYDROXIDE/ALUMINUM HYDROXICE/SIMETHICONE 120; 1200; 1200 MG/30ML; MG/30ML; MG/30ML
30 SUSPENSION ORAL EVERY 6 HOURS PRN
Status: DISCONTINUED | OUTPATIENT
Start: 2019-09-13 | End: 2019-09-16 | Stop reason: HOSPADM

## 2019-09-13 RX ORDER — OXYCODONE HYDROCHLORIDE 5 MG/1
2.5 TABLET ORAL EVERY 6 HOURS PRN
Status: DISCONTINUED | OUTPATIENT
Start: 2019-09-13 | End: 2019-09-16 | Stop reason: HOSPADM

## 2019-09-13 RX ADMIN — ACETAMINOPHEN 975 MG: 325 TABLET ORAL at 11:18

## 2019-09-13 RX ADMIN — SODIUM CHLORIDE 1000 ML: 0.9 INJECTION, SOLUTION INTRAVENOUS at 17:47

## 2019-09-13 RX ADMIN — OXYCODONE HYDROCHLORIDE 5 MG: 5 TABLET ORAL at 11:18

## 2019-09-13 NOTE — ED ATTENDING ATTESTATION
Myranda Mcbride DO, saw and evaluated the patient  I have discussed the patient with the resident/non-physician practitioner and agree with the resident's/non-physician practitioner's findings, Plan of Care, and MDM as documented in the resident's/non-physician practitioner's note, except where noted  All available labs and Radiology studies were reviewed  At this point I agree with the current assessment done in the Emergency Department  I have conducted an independent evaluation of this patient including a focused history and a physical exam     ED Note - Geno Sheets 80 y o  female MRN: [id number]  Unit/Bed#: ED 17 Encounter: [id number]    History of Present Illness   HPI  Geno Sheets is a 80 y o  female who presents for evaluation  after a mechanical fall at home  Patient states that she tripped while in her a rash causing her to fall to the ground  Patient states that she landed on her back striking her left arm against the floor  Patient is complaining of pain to the left wrist with a noted abrasion /skin tear  Also complaining of pain to the upper thoracic region  No head strike  No neck pain  No loss of consciousness  No focal neurological deficits  REVIEW OF SYSTEMS  See HPI for further details  12 systems reviewed and otherwise negative except as noted  Historical Information     PAST MEDICAL HISTORY  Past Medical History:   Diagnosis Date    Acquired absence of kidney     Frequency of micturition     GERD (gastroesophageal reflux disease)     Hypertension     Malignant neoplasm of left kidney, except renal pelvis (HCC)     Nocturia     Personal history of other malignant neoplasm of kidney     Renal mass, left     UTI (urinary tract infection)        FAMILY HISTORY  Family History   Problem Relation Age of Onset    Diabetes Father     Heart failure Brother        SOCIAL HISTORY  Social History     Socioeconomic History    Marital status:       Spouse name: None    Number of children: None    Years of education: None    Highest education level: None   Occupational History    None   Social Needs    Financial resource strain: None    Food insecurity:     Worry: None     Inability: None    Transportation needs:     Medical: None     Non-medical: None   Tobacco Use    Smoking status: Never Smoker    Smokeless tobacco: Never Used   Substance and Sexual Activity    Alcohol use: No    Drug use: No    Sexual activity: None   Lifestyle    Physical activity:     Days per week: None     Minutes per session: None    Stress: None   Relationships    Social connections:     Talks on phone: None     Gets together: None     Attends Baptism service: None     Active member of club or organization: None     Attends meetings of clubs or organizations: None     Relationship status: None    Intimate partner violence:     Fear of current or ex partner: None     Emotionally abused: None     Physically abused: None     Forced sexual activity: None   Other Topics Concern    None   Social History Narrative    None       SURGICAL HISTORY  Past Surgical History:   Procedure Laterality Date    RADICAL NEPHRECTOMY Left 2013     Meds/Allergies     CURRENT MEDICATIONS  No current facility-administered medications for this encounter       Current Outpatient Medications:     aspirin 81 MG tablet, Take 1 tablet by mouth daily, Disp: , Rfl:     betamethasone valerate (VALISONE) 0 1 % ointment, Apply small amount to affected area 1-2 times per week for irritation/itching, Disp: , Rfl:     clopidogrel (PLAVIX) 75 mg tablet, Take 1 tablet by mouth daily, Disp: , Rfl:     Cyanocobalamin (B-12) 1000 MCG CAPS, Take 1 tablet by mouth daily, Disp: , Rfl:     diclofenac sodium (VOLTAREN) 1 %, Place on the skin, Disp: , Rfl:     docusate sodium (COLACE) 100 mg capsule, Take 1 capsule by mouth 2 (two) times a day, Disp: , Rfl:     flurandrenolide (CORDRAN) 0 05 % lotion, Apply topically 2 (two) times a day, Disp: , Rfl:     furosemide (LASIX) 20 mg tablet, Take 20 mg by mouth, Disp: , Rfl:     glimepiride (AMARYL) 4 mg tablet, 4 mg every morning, Disp: , Rfl: 5    glipiZIDE (GLUCOTROL XL) 10 mg 24 hr tablet, Take 1 tablet by mouth daily, Disp: , Rfl:     levothyroxine 75 mcg tablet, Take 1 tablet by mouth daily, Disp: , Rfl:     levothyroxine 75 mcg tablet, TAKE 1 TABLET DAILY, Disp: , Rfl:     Linagliptin (TRADJENTA) 5 MG TABS, Take 1 tablet by mouth daily, Disp: , Rfl:     Linagliptin 5 MG TABS, Take 1 tablet by mouth, Disp: , Rfl:     losartan (COZAAR) 100 MG tablet, Take 1 tablet by mouth daily, Disp: , Rfl:     losartan (COZAAR) 50 mg tablet, Take 50 mg by mouth daily, Disp: , Rfl: 11    magnesium chloride (MAG-SR) 535 mg, Take 1 tablet by mouth daily, Disp: , Rfl:     magnesium chloride (MAG64) 64 MG TBEC EC tablet, Take 1 tablet by mouth, Disp: , Rfl:     metFORMIN (GLUCOPHAGE-XR) 750 mg 24 hr tablet, Take 1 tablet by mouth daily, Disp: , Rfl:     metFORMIN (GLUCOPHAGE-XR) 750 mg 24 hr tablet, Take 1 tablet by mouth, Disp: , Rfl:     methocarbamol (ROBAXIN) 500 mg tablet, Take 1 tablet by mouth every 6 (six) hours as needed, Disp: , Rfl:     methocarbamol (ROBAXIN) 500 mg tablet, Take 500 mg by mouth daily as needed, Disp: , Rfl:     metoprolol succinate (TOPROL-XL) 25 mg 24 hr tablet, Take 1 tablet by mouth daily, Disp: , Rfl:     metoprolol succinate (TOPROL-XL) 25 mg 24 hr tablet, TAKE 1 TABLET DAILY, Disp: , Rfl:     mupirocin (BACTROBAN) 2 % ointment, Apply topically 3 (three) times a day for 10 days, Disp: 22 g, Rfl: 0    mupirocin (BACTROBAN) 2 % ointment, mupirocin 2 % topical ointment  APPLY TO THE AFFECTED AREA THREE TIMES A DAY FOR 10 DAYS, Disp: , Rfl:     omeprazole (PRILOSEC) 20 mg delayed release capsule, Take 1 capsule by mouth daily, Disp: , Rfl:     Omeprazole-Sodium Bicarbonate  MG CAPS, omeprazole 20 mg capsule,delayed release  TAKE 1 CAPSULE DAILY, Disp: , Rfl:     repaglinide (PRANDIN) 0 5 mg tablet, Take 1 tablet by mouth every 8 (eight) hours, Disp: , Rfl:     simvastatin (ZOCOR) 40 mg tablet, Take 1 tablet by mouth daily, Disp: , Rfl:     simvastatin (ZOCOR) 40 mg tablet, TAKE 1 TABLET DAILY, Disp: , Rfl:     triamcinolone (KENALOG) 0 1 % cream, Apply topically, Disp: , Rfl:     (Not in a hospital admission)    ALLERGIES  Allergies   Allergen Reactions    Cephalexin     Clindamycin     Doxycycline     Erythromycin Base     Lactate     Levaquin [Levofloxacin]     Penicillins     Procainamide     Quinidine (Non-Therapeutic)     Sulfa Antibiotics      Objective     PHYSICAL EXAM    VITAL SIGNS: Blood pressure 115/56, pulse 78, temperature 98 °F (36 7 °C), temperature source Oral, resp  rate 19, weight 67 8 kg (149 lb 7 6 oz), SpO2 97 %, not currently breastfeeding  Constitutional:  Appears well developed and well nourished, no acute distress, non-toxic appearance   Eyes:  PERRL, EOMI, conjunctivae pink, sclerae non-icteric    HENT:  Normocephalic/Atraumatic, no rhinorrhea, mucous membranes moist, Tympanic Membranes clear   Neck: normal range of motion, no tenderness, supple   Respiratory:  No respiratory distress, normal breath sounds  Cardiovascular:  Normal rate, normal rhythm   GI:  Soft, non-tender, non-distended  :  No CVAT, no flank ecchymosis  Musculoskeletal:    Tenderness to the upper thoracic vertebrae,  Abrasion/ skin to the left wrist with tenderness over the distal left forearm in the dorsal aspect  No obvious deformity  Neurovascular intact  No tenderness over the anatomic snuffbox  No swelling    Integument:  Pink, warm, dry, Well hydrated, no rash, no erythema, no bullae   Lymphatic:  No cervical/ tonsillar/ submandibular lymphadenopathy noted   Neurologic:  Awake, Alert & oriented x 3, CN 2-12 intact, no focal neurological deficits  Psychiatric:  Speech and behavior appropriate       ED COURSE and MDM:    Assessment/Plan   Assessment:  Darline Mcdonald is a 80 y o  female presents for evaluation after a mechanical fall  Left wrist pain and upper thoracic pain  Plan:    Symptom management, x-rays, CT scan thoracic vertebrae,  Physical therapy evaluation, disposition as appropriate  CRITICAL CARE TIME: 0 minutes      Portions of the record may have been created with voice recognition software  Occasional wrong word or "sound a like" substitutions may have occurred due to the inherent limitations of voice recognition software       ED Provider  Electronically Signed by

## 2019-09-13 NOTE — ASSESSMENT & PLAN NOTE
81 yo female with history of DM, HTN, HL, Hypothyroidism, CKD has sustained a fall in her garage  She felt dizzy prior to fall  She was not able to get up, she called for help by pressing car button  She denied any chest pain, shortness of breath and headache  She has eaten, no issues with her blood glucose and she is on hyperglycemics  This is recurrent fall, she was noted to have angelique cardia to 40s with dizziness   She was on betablocker and possible link to bradycardia  Would hold off metoprolol for now  Telemetry monitoring  troponin were elevated, trend more  EKG repeat   Cardiology consult

## 2019-09-13 NOTE — H&P
H&P- Robert Favre 1/3/1932, 80 y o  female MRN: [id number]    Unit/Bed#: ED 17 Encounter: [id number]    Primary Care Provider: Anne Vazquez MD   Date and time admitted to hospital: 9/13/2019  9:35 AM        Hypertension  Assessment & Plan  Continue with losartan 50 mg  Lasix 20 mg  Holding most metoprolol for Angelique arrhythmias    Hyperlipidemia  Assessment & Plan  Continue with statins    CKD (chronic kidney disease)  Assessment & Plan  Creatinine now is 1 64, there are times the creatinine was higher  Will trend the creatinine  Ensure the patient is not dehydrated  She looks like she is at baseline    Hypothyroidism  Assessment & Plan  Patient is on levothyroxine would continue at 75 mcg daily in the morning    Bradyarrhythmia  Assessment & Plan  She as noted to be in the 40s, will hold betablockers and see if it is contributing to her symptoms  Telemetry monitoring      Fall  Assessment & Plan  79 yo female with history of DM, HTN, HL, Hypothyroidism, CKD has sustained a fall in her garage  She felt dizzy prior to fall  She was not able to get up, she called for help by pressing car button  She denied any chest pain, shortness of breath and headache  She has eaten, no issues with her blood glucose and she is on hyperglycemics  This is recurrent fall, she was noted to have angelique cardia to 40s with dizziness  She was on betablocker and possible link to bradycardia  Would hold off metoprolol for now  Telemetry monitoring  troponin were elevated, trend more  EKG repeat   Cardiology consult            VTE Prophylaxis: Enoxaparin (Lovenox)  / sequential compression device   Code Status: full code  POLST: POLST is not applicable to this patient  Discussion with family:      Anticipated Length of Stay:  Patient will be admitted on an Inpatient basis with an anticipated length of stay of  More than 2 midnights     Justification for Hospital Stay: fall and possible syncope    Total Time for Visit, including Counseling / Coordination of Care: 45 minutes  Greater than 50% of this total time spent on direct patient counseling and coordination of care  Chief Complaint:   Fall    History of Present Illness:    Marge Turner is a 80 y o  female who presents with dizziness and fall  Patient was okay this morning she walked into her garage  She she felt dizzy, no nausea vomiting and no headache and fell to the ground  She has injured her wrists, there is bruising noted  She remember falling  She did not lose consciousness  She was lying there on the floor for at least 30 minutes before she could get help from her neighbors to bring her to to the hospital by EMS  She was noted to have some bradyarrhythmia as and having dizziness with heart rate of 40s  She was noted to have elevated troponins  She is going to be admitted for telemetry monitoring  Review of Systems:    Review of Systems   Constitutional: Negative  HENT: Negative  Eyes: Negative  Respiratory: Negative  Cardiovascular: Positive for chest pain  Gastrointestinal: Positive for anal bleeding and blood in stool  Endocrine: Negative  Genitourinary: Negative  Musculoskeletal: Negative  Skin: Negative  Allergic/Immunologic: Negative  Neurological: Negative  Hematological: Negative  Psychiatric/Behavioral: Negative  Past Medical and Surgical History:     Past Medical History:   Diagnosis Date    Acquired absence of kidney     Frequency of micturition     GERD (gastroesophageal reflux disease)     Hypertension     Malignant neoplasm of left kidney, except renal pelvis (HCC)     Nocturia     Personal history of other malignant neoplasm of kidney     Renal mass, left     UTI (urinary tract infection)        Past Surgical History:   Procedure Laterality Date    RADICAL NEPHRECTOMY Left 2013       Meds/Allergies:    Prior to Admission medications    Medication Sig Start Date End Date Taking? Authorizing Provider   aspirin 81 MG tablet Take 1 tablet by mouth daily   Yes Historical Provider, MD   furosemide (LASIX) 20 mg tablet Take 20 mg by mouth 11/27/18  Yes Historical Provider, MD   losartan (COZAAR) 100 MG tablet Take 1 tablet by mouth daily   Yes Historical Provider, MD   metoprolol succinate (TOPROL-XL) 25 mg 24 hr tablet Take 1 tablet by mouth daily   Yes Historical Provider, MD   Omeprazole-Sodium Bicarbonate  MG CAPS omeprazole 20 mg capsule,delayed release   TAKE 1 CAPSULE DAILY   Yes Historical Provider, MD   betamethasone valerate (VALISONE) 0 1 % ointment Apply small amount to affected area 1-2 times per week for irritation/itching 12/12/18   Historical Provider, MD   clopidogrel (PLAVIX) 75 mg tablet Take 1 tablet by mouth daily    Historical Provider, MD   Cyanocobalamin (B-12) 1000 MCG CAPS Take 1 tablet by mouth daily    Historical Provider, MD   diclofenac sodium (VOLTAREN) 1 % Place on the skin    Historical Provider, MD   docusate sodium (COLACE) 100 mg capsule Take 1 capsule by mouth 2 (two) times a day    Historical Provider, MD   flurandrenolide (CORDRAN) 0 05 % lotion Apply topically 2 (two) times a day    Historical Provider, MD   glimepiride (AMARYL) 4 mg tablet 4 mg every morning 3/30/19   Historical Provider, MD   glipiZIDE (GLUCOTROL XL) 10 mg 24 hr tablet Take 1 tablet by mouth daily    Historical Provider, MD   levothyroxine 75 mcg tablet Take 1 tablet by mouth daily    Historical Provider, MD   levothyroxine 75 mcg tablet TAKE 1 TABLET DAILY 2/11/19   Historical Provider, MD   Linagliptin (TRADJENTA) 5 MG TABS Take 1 tablet by mouth daily    Historical Provider, MD   Linagliptin 5 MG TABS Take 1 tablet by mouth    Historical Provider, MD   losartan (COZAAR) 50 mg tablet Take 50 mg by mouth daily 3/29/19   Historical Provider, MD   magnesium chloride (MAG-SR) 535 mg Take 1 tablet by mouth daily    Historical Provider, MD   magnesium chloride (MAG64) 64 MG TBEC EC tablet Take 1 tablet by mouth    Historical Provider, MD   metFORMIN (GLUCOPHAGE-XR) 750 mg 24 hr tablet Take 1 tablet by mouth daily    Historical Provider, MD   metFORMIN (GLUCOPHAGE-XR) 750 mg 24 hr tablet Take 1 tablet by mouth    Historical Provider, MD   methocarbamol (ROBAXIN) 500 mg tablet Take 1 tablet by mouth every 6 (six) hours as needed    Historical Provider, MD   methocarbamol (ROBAXIN) 500 mg tablet Take 500 mg by mouth daily as needed 5/24/18   Historical Provider, MD   metoprolol succinate (TOPROL-XL) 25 mg 24 hr tablet TAKE 1 TABLET DAILY 2/11/19   Historical Provider, MD   mupirocin (BACTROBAN) 2 % ointment Apply topically 3 (three) times a day for 10 days 4/15/19 4/25/19  Farrah Fermin PA-C   mupirocin (BACTROBAN) 2 % ointment mupirocin 2 % topical ointment   APPLY TO THE AFFECTED AREA THREE TIMES A DAY FOR 10 DAYS    Historical Provider, MD   omeprazole (PRILOSEC) 20 mg delayed release capsule Take 1 capsule by mouth daily    Historical Provider, MD   repaglinide (PRANDIN) 0 5 mg tablet Take 1 tablet by mouth every 8 (eight) hours    Historical Provider, MD   simvastatin (ZOCOR) 40 mg tablet Take 1 tablet by mouth daily    Historical Provider, MD   simvastatin (ZOCOR) 40 mg tablet TAKE 1 TABLET DAILY 3/4/19   Historical Provider, MD   triamcinolone (KENALOG) 0 1 % cream Apply topically 10/25/18 10/25/19  Historical Provider, MD     I have reviewed home medications with patient personally  Allergies: Allergies   Allergen Reactions    Cephalexin     Clindamycin     Doxycycline     Erythromycin Base     Lactate     Levaquin [Levofloxacin]     Penicillins     Procainamide     Quinidine (Non-Therapeutic)     Sulfa Antibiotics        Social History:     Marital Status:     Occupation:    Patient Pre-hospital Living Situation: at home by herself  Patient Pre-hospital Level of Mobility: able to ambulate  Patient Pre-hospital Diet Restrictions: diabetic  Substance Use History: Social History     Substance and Sexual Activity   Alcohol Use No     Social History     Tobacco Use   Smoking Status Never Smoker   Smokeless Tobacco Never Used     Social History     Substance and Sexual Activity   Drug Use No       Family History:    Family History   Problem Relation Age of Onset    Diabetes Father     Heart failure Brother        Physical Exam:     Vitals:   Blood Pressure: 164/64 (09/13/19 1830)  Pulse: 80 (09/13/19 1830)  Temperature: 98 °F (36 7 °C) (09/13/19 0943)  Temp Source: Oral (09/13/19 0943)  Respirations: (!) 24 (09/13/19 1830)  Weight - Scale: 67 8 kg (149 lb 7 6 oz) (09/13/19 0940)  SpO2: 99 % (09/13/19 1830)    Physical Exam   Constitutional: She is oriented to person, place, and time  She appears well-developed and well-nourished  She appears distressed  HENT:   Head: Normocephalic and atraumatic  Right Ear: External ear normal    Eyes: Pupils are equal, round, and reactive to light  Conjunctivae and EOM are normal  Right eye exhibits no discharge  Left eye exhibits no discharge  No scleral icterus  Neck: Normal range of motion  Neck supple  No JVD present  No tracheal deviation present  No thyromegaly present  Cardiovascular: Normal rate, regular rhythm, normal heart sounds and intact distal pulses  Exam reveals no gallop and no friction rub  No murmur heard  Pulmonary/Chest: Effort normal and breath sounds normal  No stridor  No respiratory distress  She has no wheezes  She has no rales  She exhibits no tenderness  Abdominal: Soft  Bowel sounds are normal    Musculoskeletal: Normal range of motion  She exhibits no edema, tenderness or deformity  Lymphadenopathy:     She has no cervical adenopathy  Neurological: She is alert and oriented to person, place, and time  She displays normal reflexes  No cranial nerve deficit  She exhibits normal muscle tone  Coordination normal    Skin: Skin is warm and dry  No rash noted  No erythema  No pallor     Psychiatric: She has a normal mood and affect  Her behavior is normal  Judgment and thought content normal    Nursing note and vitals reviewed  Additional Data:     Lab Results: I have personally reviewed pertinent reports  Results from last 7 days   Lab Units 09/13/19  1612   WBC Thousand/uL 9 55   HEMOGLOBIN g/dL 11 6   HEMATOCRIT % 37 6   PLATELETS Thousands/uL 227   NEUTROS PCT % 71   LYMPHS PCT % 17   MONOS PCT % 10   EOS PCT % 1     Results from last 7 days   Lab Units 09/13/19  1612   SODIUM mmol/L 137   POTASSIUM mmol/L 4 1   CHLORIDE mmol/L 103   CO2 mmol/L 27   BUN mg/dL 34*   CREATININE mg/dL 1 64*   ANION GAP mmol/L 7   CALCIUM mg/dL 8 4   GLUCOSE RANDOM mg/dL 138         Results from last 7 days   Lab Units 09/13/19  1551   POC GLUCOSE mg/dl 152*               Imaging: I have personally reviewed pertinent reports  CT spine thoracic wo contrast   Final Result by Kelly Mon DO (09/13 1528)      No fracture or traumatic malalignment  Findings consistent with DISH  Small pulmonary nodules measuring up to 4 mm  Based on current Fleischner Society 2017 Guidelines on incidental pulmonary nodule, no routine follow-up is needed if the patient is considered low risk for lung cancer  If the patient is considered high    risk for lung cancer, 12 month follow-up non-contrast chest CT is recommended  Mild left upper lobe groundglass density  Correlate clinically for pneumonitis  Short interval follow-up chest films may be helpful  Workstation performed: [id number]         XR forearm 2 views LEFT   ED Interpretation by Gayle Mccormack MD (09/13 1133)   No acute osseous injury      Final Result by Bakari Bowles MD (09/13 1222)      No acute osseous abnormality              Workstation performed: [id number]         XR wrist 3+ views LEFT   ED Interpretation by Gayle Mccormack MD (09/13 1134)   No acute osseous injury      Final Result by Bakari Bowles MD (09/13 1221)      No acute osseous abnormality  Degenerative changes as described  Workstation performed: [id number]         XR elbow 3+ vw LEFT   ED Interpretation by Vicenta Foote MD (09/13 4762)   No acute osseous injury      Final Result by Irma Nicholas MD (09/13 1223)      No acute osseous abnormality  Degenerative changes as described  Workstation performed: [id number]             EKG, Pathology, and Other Studies Reviewed on Admission:   · EKG:      Allscripts / Epic Records Reviewed: Yes     ** Please Note: This note has been constructed using a voice recognition system   **

## 2019-09-13 NOTE — ED PROVIDER NOTES
History  Chief Complaint   Patient presents with    Fall     Pt states she was in her garage and turned and lost her balance and fell, bracing herself with her arms  Denies LOC, syncope or dizziness prior to fall, denies head strike  L wrist pain     70-year-old female presents emergency department for fall  Patient says she was in her garage when she turned missed her footing and fell on her knees and braced herself with her left wrist   She now has pain to the dorsal surface of her left wrist   She has skin tears to her bilateral elbows  Also has some mild thoracic pain  Denies any head injury, loss of consciousness, neck pain, headache, focal neurological symptoms, additional extremity pain, any additional injuries or complaints  She is on baby aspirin but no other blood thinners  Prior to Admission Medications   Prescriptions Last Dose Informant Patient Reported? Taking?    Cyanocobalamin (B-12) 1000 MCG CAPS  Self Yes No   Sig: Take 1 tablet by mouth daily   aspirin 81 MG tablet  Self Yes Yes   Sig: Take 1 tablet by mouth daily   docusate sodium (COLACE) 100 mg capsule  Self Yes No   Sig: Take 1 capsule by mouth 2 (two) times a day   furosemide (LASIX) 20 mg tablet   Yes Yes   Sig: Take 20 mg by mouth every other day    glimepiride (AMARYL) 4 mg tablet   Yes No   Si mg every morning   levothyroxine 75 mcg tablet  Self Yes No   Sig: Take 1 tablet by mouth daily   losartan (COZAAR) 100 MG tablet  Self Yes Yes   Sig: Take 1 tablet by mouth daily   magnesium chloride (MAG64) 64 MG TBEC EC tablet   Yes No   Sig: Take 1 tablet by mouth   metoprolol succinate (TOPROL-XL) 25 mg 24 hr tablet  Self Yes Yes   Sig: Take 1 tablet by mouth daily   mupirocin (BACTROBAN) 2 % ointment   No No   Sig: Apply topically 3 (three) times a day for 10 days   omeprazole (PRILOSEC) 20 mg delayed release capsule  Self Yes No   Sig: Take 1 capsule by mouth daily   simvastatin (ZOCOR) 40 mg tablet  Self Yes No   Sig: Take 1 tablet by mouth daily      Facility-Administered Medications: None       Past Medical History:   Diagnosis Date    Acquired absence of kidney     Frequency of micturition     GERD (gastroesophageal reflux disease)     Hypertension     Malignant neoplasm of left kidney, except renal pelvis (HCC)     Nocturia     Personal history of other malignant neoplasm of kidney     Renal mass, left     UTI (urinary tract infection)        Past Surgical History:   Procedure Laterality Date    RADICAL NEPHRECTOMY Left 2013       Family History   Problem Relation Age of Onset    Diabetes Father     Heart failure Brother      I have reviewed and agree with the history as documented  Social History     Tobacco Use    Smoking status: Never Smoker    Smokeless tobacco: Never Used   Substance Use Topics    Alcohol use: Not Currently    Drug use: Not Currently        Review of Systems   Constitutional: Negative  Negative for chills and fever  HENT: Negative  Negative for rhinorrhea  Eyes: Negative  Respiratory: Negative  Negative for cough and shortness of breath  Cardiovascular: Negative  Negative for chest pain and leg swelling  Gastrointestinal: Negative  Negative for abdominal pain, diarrhea, nausea and vomiting  Genitourinary: Negative  Negative for dysuria, flank pain and frequency  Musculoskeletal: Positive for back pain  Negative for neck pain  Left wrist pain   Skin: Negative  Negative for rash  Neurological: Negative  Negative for light-headedness and headaches  All other systems reviewed and are negative        Physical Exam  ED Triage Vitals   Temperature Pulse Respirations Blood Pressure SpO2   09/13/19 0943 09/13/19 0940 09/13/19 0940 09/13/19 0940 09/13/19 0940   98 °F (36 7 °C) 94 20 128/60 98 %      Temp Source Heart Rate Source Patient Position - Orthostatic VS BP Location FiO2 (%)   09/13/19 0943 09/13/19 0940 09/13/19 0940 09/13/19 0940 --   Oral Monitor Sitting Right arm       Pain Score       09/13/19 0940       5             Orthostatic Vital Signs  Vitals:    09/15/19 0702 09/15/19 1041 09/15/19 1043 09/15/19 1045   BP: 120/56 140/64 130/77 (S) (!) 79/58   Pulse: 70  80    Patient Position - Orthostatic VS:  Lying - Orthostatic VS Sitting - Orthostatic VS Standing - Orthostatic VS       Physical Exam   Constitutional: She is oriented to person, place, and time  She appears well-developed and well-nourished  No distress  HENT:   Head: Normocephalic and atraumatic  Mouth/Throat: Oropharynx is clear and moist    Eyes: EOM are normal    Neck: Normal range of motion  Neck supple  Cardiovascular: Normal rate, regular rhythm, normal heart sounds and intact distal pulses  Exam reveals no gallop and no friction rub  No murmur heard  Pulmonary/Chest: Effort normal and breath sounds normal  No respiratory distress  She has no wheezes  She has no rales  Abdominal: Soft  There is no tenderness  There is no rebound and no guarding  Musculoskeletal: She exhibits tenderness (Mildly tender over thoracic spine and to the dorsum of her left wrist   No step-offs or deformities  )  She exhibits no edema  Neurological: She is alert and oriented to person, place, and time  No cranial nerve deficit  Clear fluent speech   Skin: Skin is warm and dry  Capillary refill takes less than 2 seconds  Skin tears to bilateral elbows   Psychiatric: She has a normal mood and affect  Nursing note and vitals reviewed        ED Medications  Medications   aspirin (ECOTRIN LOW STRENGTH) EC tablet 81 mg (81 mg Oral Given 9/15/19 0948)   cyanocobalamin (VITAMIN B-12) tablet 1,000 mcg (1,000 mcg Oral Given 9/15/19 0948)   levothyroxine tablet 75 mcg (75 mcg Oral Given 9/15/19 0527)   pantoprazole (PROTONIX) EC tablet 20 mg (20 mg Oral Given 9/15/19 0527)   pravastatin (PRAVACHOL) tablet 80 mg (80 mg Oral Given 9/14/19 1838)   docusate sodium (COLACE) capsule 100 mg (100 mg Oral Given 9/15/19 0236)   senna (SENOKOT) tablet 8 6 mg (8 6 mg Oral Given 9/15/19 0948)   ondansetron (ZOFRAN) injection 4 mg (has no administration in time range)   aluminum-magnesium hydroxide-simethicone (MYLANTA) 200-200-20 mg/5 mL oral suspension 30 mL (30 mL Oral Given 9/14/19 0248)   acetaminophen (TYLENOL) tablet 650 mg (650 mg Oral Given 9/15/19 0948)   oxyCODONE (ROXICODONE) IR tablet 2 5 mg (has no administration in time range)   insulin lispro (HumaLOG) 100 units/mL subcutaneous injection 1-5 Units (1 Units Subcutaneous Not Given 9/15/19 0813)   enoxaparin (LOVENOX) subcutaneous injection 30 mg (30 mg Subcutaneous Given 9/15/19 0948)   magnesium sulfate 2 g/50 mL IVPB (premix) 2 g (has no administration in time range)   Magnesium Chloride (MAG-DELAY) ER tablet 1 tablet (has no administration in time range)   sodium chloride 0 9 % infusion (has no administration in time range)   oxyCODONE (ROXICODONE) IR tablet 5 mg (5 mg Oral Given 9/13/19 1118)   acetaminophen (TYLENOL) tablet 975 mg (975 mg Oral Given 9/13/19 1118)   sodium chloride 0 9 % bolus 1,000 mL (0 mL Intravenous Stopped 9/13/19 1947)       Diagnostic Studies  Results Reviewed     Procedure Component Value Units Date/Time    Comprehensive metabolic panel - Morning Draw [[id number]]  (Abnormal) Collected:  09/14/19 0650    Lab Status:  Final result Specimen:  Blood from Hand, Right Updated:  09/14/19 0736     Sodium 137 mmol/L      Potassium 4 6 mmol/L      Chloride 107 mmol/L      CO2 21 mmol/L      ANION GAP 9 mmol/L      BUN 26 mg/dL      Creatinine 1 38 mg/dL      Glucose 131 mg/dL      Calcium 8 1 mg/dL      AST 29 U/L      ALT 14 U/L      Alkaline Phosphatase 18 U/L      Total Protein 6 3 g/dL      Albumin 2 9 g/dL      Total Bilirubin 0 84 mg/dL      eGFR 34 ml/min/1 73sq m     Narrative:       [place] guidelines for Chronic Kidney Disease (CKD):     Stage 1 with normal or high GFR (GFR > 90 mL/min/1 73 square meters)    Stage 2 Mild CKD (GFR = 60-89 mL/min/1 73 square meters)    Stage 3A Moderate CKD (GFR = 45-59 mL/min/1 73 square meters)    Stage 3B Moderate CKD (GFR = 30-44 mL/min/1 73 square meters)    Stage 4 Severe CKD (GFR = 15-29 mL/min/1 73 square meters)    Stage 5 End Stage CKD (GFR <15 mL/min/1 73 square meters)  Note: GFR calculation is accurate only with a steady state creatinine    Magnesium - Morning Draw [[id number]]  (Abnormal) Collected:  09/14/19 0650    Lab Status:  Final result Specimen:  Blood from Hand, Right Updated:  09/14/19 0736     Magnesium 1 3 mg/dL     CBC and differential - Morning Draw [[id number]]  (Abnormal) Collected:  09/14/19 0650    Lab Status:  Final result Specimen:  Blood from Hand, Right Updated:  09/14/19 0705     WBC 11 16 Thousand/uL      RBC 3 72 Million/uL      Hemoglobin 11 0 g/dL      Hematocrit 36 9 %      MCV 99 fL      MCH 29 6 pg      MCHC 29 8 g/dL      RDW 13 3 %      MPV 10 6 fL      Platelets 284 Thousands/uL      nRBC 0 /100 WBCs      Neutrophils Relative 78 %      Immat GRANS % 1 %      Lymphocytes Relative 7 %      Monocytes Relative 13 %      Eosinophils Relative 1 %      Basophils Relative 0 %      Neutrophils Absolute 8 84 Thousands/µL      Immature Grans Absolute 0 06 Thousand/uL      Lymphocytes Absolute 0 77 Thousands/µL      Monocytes Absolute 1 40 Thousand/µL      Eosinophils Absolute 0 06 Thousand/µL      Basophils Absolute 0 03 Thousands/µL     Troponin I [[id number]]  (Abnormal) Collected:  09/14/19 0203    Lab Status:  Final result Specimen:  Blood from Arm, Right Updated:  09/14/19 0229     Troponin I 0 38 ng/mL     Troponin I [[id number]]  (Abnormal) Collected:  09/13/19 2212    Lab Status:  Final result Specimen:  Blood from Arm, Right Updated:  09/13/19 2249     Troponin I 0 54 ng/mL     Hepatic function panel [[id number]]  (Abnormal) Collected:  09/13/19 1612    Lab Status:  Final result Specimen:  Blood from Arm, Right Updated:  09/13/19 2130     Total Bilirubin 0 61 mg/dL      Bilirubin, Direct 0 18 mg/dL      Alkaline Phosphatase 76 U/L      AST 23 U/L      ALT 17 U/L      Total Protein 6 9 g/dL      Albumin 3 4 g/dL     Troponin I [[id number]]  (Abnormal) Collected:  09/13/19 1920    Lab Status:  Final result Specimen:  Blood from Hand, Right Updated:  09/13/19 1945     Troponin I 0 47 ng/mL     Magnesium [[id number]]  (Abnormal) Collected:  09/13/19 1612    Lab Status:  Final result Specimen:  Blood from Arm, Right Updated:  09/13/19 1824     Magnesium 1 2 mg/dL     Troponin I [[id number]]  (Abnormal) Collected:  09/13/19 1612    Lab Status:  Final result Specimen:  Blood from Arm, Right Updated:  09/13/19 1705     Troponin I 0 38 ng/mL     Basic metabolic panel [[id number]]  (Abnormal) Collected:  09/13/19 1612    Lab Status:  Final result Specimen:  Blood from Arm, Right Updated:  09/13/19 1700     Sodium 137 mmol/L      Potassium 4 1 mmol/L      Chloride 103 mmol/L      CO2 27 mmol/L      ANION GAP 7 mmol/L      BUN 34 mg/dL      Creatinine 1 64 mg/dL      Glucose 138 mg/dL      Calcium 8 4 mg/dL      eGFR 28 ml/min/1 73sq m     Narrative:       [place] guidelines for Chronic Kidney Disease (CKD):     Stage 1 with normal or high GFR (GFR > 90 mL/min/1 73 square meters)    Stage 2 Mild CKD (GFR = 60-89 mL/min/1 73 square meters)    Stage 3A Moderate CKD (GFR = 45-59 mL/min/1 73 square meters)    Stage 3B Moderate CKD (GFR = 30-44 mL/min/1 73 square meters)    Stage 4 Severe CKD (GFR = 15-29 mL/min/1 73 square meters)    Stage 5 End Stage CKD (GFR <15 mL/min/1 73 square meters)  Note: GFR calculation is accurate only with a steady state creatinine    CBC and differential [[id number]]  (Abnormal) Collected:  09/13/19 1612    Lab Status:  Final result Specimen:  Blood from Arm, Right Updated:  09/13/19 1641     WBC 9 55 Thousand/uL      RBC 3 83 Million/uL      Hemoglobin 11 6 g/dL      Hematocrit 37 6 %      MCV 98 fL      MCH 30 3 pg      MCHC 30 9 g/dL      RDW 13 4 %      MPV 11 0 fL      Platelets 486 Thousands/uL      nRBC 0 /100 WBCs      Neutrophils Relative 71 %      Immat GRANS % 0 %      Lymphocytes Relative 17 %      Monocytes Relative 10 %      Eosinophils Relative 1 %      Basophils Relative 1 %      Neutrophils Absolute 6 77 Thousands/µL      Immature Grans Absolute 0 03 Thousand/uL      Lymphocytes Absolute 1 64 Thousands/µL      Monocytes Absolute 0 97 Thousand/µL      Eosinophils Absolute 0 09 Thousand/µL      Basophils Absolute 0 05 Thousands/µL     Fingerstick Glucose (POCT) [[id number]]  (Abnormal) Collected:  09/13/19 1551    Lab Status:  Final result Updated:  09/13/19 1552     POC Glucose 152 mg/dl                  CT spine thoracic wo contrast   Final Result by Kit Coffman DO (09/13 1528)      No fracture or traumatic malalignment  Findings consistent with DISH  Small pulmonary nodules measuring up to 4 mm  Based on current [employer] Society 2017 Guidelines on incidental pulmonary nodule, no routine follow-up is needed if the patient is considered low risk for lung cancer  If the patient is considered high    risk for lung cancer, 12 month follow-up non-contrast chest CT is recommended  Mild left upper lobe groundglass density  Correlate clinically for pneumonitis  Short interval follow-up chest films may be helpful  Workstation performed: [id number]         XR forearm 2 views LEFT   ED Interpretation by Mary Cleary MD (09/13 1138)   No acute osseous injury      Final Result by Lee Acevedo MD (09/13 3996)      No acute osseous abnormality  Workstation performed: [id number]         XR wrist 3+ views LEFT   ED Interpretation by Mary Cleary MD (09/13 1134)   No acute osseous injury      Final Result by Lee Acevedo MD (09/13 1221)      No acute osseous abnormality  Degenerative changes as described                    Workstation performed: [id number]         XR elbow 3+ vw LEFT   ED Interpretation by Denzel Mcgovern MD (09/13 8669)   No acute osseous injury      Final Result by Tarsha Morales MD (09/13 1223)      No acute osseous abnormality  Degenerative changes as described  Workstation performed: [id number]               Procedures  ECG 12 Lead Documentation Only  Date/Time: 9/13/2019 5:31 PM  Performed by: Denzel Mcgovern MD  Authorized by: Denzel Mcgovern MD     ECG reviewed by me, the ED Provider: yes    Patient location:  ED  Previous ECG:     Previous ECG:  Compared to current    Similarity:  No change  Interpretation:     Interpretation: non-specific    Rate:     ECG rate:  78    ECG rate assessment: normal    Rhythm:     Rhythm: sinus rhythm    Ectopy:     Ectopy: none    QRS:     QRS axis:  Normal    QRS intervals:  Normal  ST segments:     ST segments:  Normal  T waves:     T waves: normal    Comments:      QTc elevated at 524            ED Course  ED Course as of Sep 15 1145   Fri Sep 13, 2019   3786 Basic metabolic panel(!)           Identification of Seniors at Risk      Most Recent Value   (ISAR) Identification of Seniors at Risk   Before the illness or injury that brought you to the Emergency, did you need someone to help you on a regular basis? 0 Filed at: 09/13/2019 0944   In the last 24 hours, have you needed more help than usual?  0 Filed at: 09/13/2019 4625   Have you been hospitalized for one or more nights during the past 6 months? 0 Filed at: 09/13/2019 0944   In general, do you see well?  0 Filed at: 09/13/2019 0944   In general, do you have serious problems with your memory?   0 Filed at: 09/13/2019 0944   Do you take more than three different medications every day?  0 Filed at: 09/13/2019 0944   ISAR Score  0 Filed at: 09/13/2019 0944                          MDM  Number of Diagnoses or Management Options  Elevated troponin:   Fall, initial encounter:   Lightheadedness:   Pulmonary nodules:   Diagnosis management comments: 77-year-old female presents emergency department for fall  Nothing acute on wrist x-ray and thoracic CT  Attempted to ambulate patient but she began complaining of lightheadedness  Start IV fluids and obtain cardiopulmonary workup, which reveals elevated troponin  She is intermittently dropping to a heart rate in the 40s for a couple seconds at a time, but EKG does not show any evidence of heart block, and she is asymptomatic with those episodes  Discussed with admitting physician for [facility] who agrees to accept patient for further management  Disposition  Final diagnoses:   Fall, initial encounter   Lightheadedness   Elevated troponin   Pulmonary nodules     Time reflects when diagnosis was documented in both MDM as applicable and the Disposition within this note     Time User Action Codes Description Comment    9/13/2019  5:39 PM MinorDebi Add [W19  XXXA] Fall, initial encounter     9/13/2019  5:39 PM Debi Barragan Add [R42] Lightheadedness     9/13/2019  5:39 PM Debi Barragan Add [R74 8] Elevated troponin     9/13/2019  5:39 PM Debi Barragan Add [R91 8] Pulmonary nodules     9/14/2019 10:55 AM Bakari Ledesma M Modify [R74 8] Elevated troponin     9/14/2019 10:55 AM Bakari Ledesma Add [R55] Near syncope     9/14/2019 10:55 AM Bakari Rodríguez Modify [R74 8] Elevated troponin     9/14/2019 10:55 AM Bakari Rodríguez Modify [R74 8] Elevated troponin     9/14/2019 10:55 AM Bakari Rodríguez Add [I49 8] Bradyarrhythmia     9/14/2019 10:55 AM Bakari Rodríguez M Modify [R74 8] Elevated troponin     9/14/2019 10:55 AM Bakari Rodríguez Modify [R74 8] Elevated troponin     9/14/2019 10:55 AM Ricardo Rodríguez Modify [R74 8] Elevated troponin       ED Disposition     ED Disposition Condition Date/Time Comment    Admit Stable Fri Sep 13, 2019  5:39 PM Case was discussed with AUDREY and the patient's admission status was agreed to be Admission Status: inpatient status to the service of Dr Ammon Dakins           Follow-up Information None         Current Discharge Medication List      CONTINUE these medications which have NOT CHANGED    Details   aspirin 81 MG tablet Take 1 tablet by mouth daily      furosemide (LASIX) 20 mg tablet Take 20 mg by mouth every other day       losartan (COZAAR) 100 MG tablet Take 1 tablet by mouth daily      metoprolol succinate (TOPROL-XL) 25 mg 24 hr tablet Take 1 tablet by mouth daily      Cyanocobalamin (B-12) 1000 MCG CAPS Take 1 tablet by mouth daily      docusate sodium (COLACE) 100 mg capsule Take 1 capsule by mouth 2 (two) times a day      glimepiride (AMARYL) 4 mg tablet 4 mg every morning  Refills: 5      levothyroxine 75 mcg tablet Take 1 tablet by mouth daily      magnesium chloride (MAG64) 64 MG TBEC EC tablet Take 1 tablet by mouth      mupirocin (BACTROBAN) 2 % ointment Apply topically 3 (three) times a day for 10 days  Qty: 22 g, Refills: 0    Associated Diagnoses: Abrasion of leg with infection, right, initial encounter      omeprazole (PRILOSEC) 20 mg delayed release capsule Take 1 capsule by mouth daily      simvastatin (ZOCOR) 40 mg tablet Take 1 tablet by mouth daily           No discharge procedures on file  ED Provider  Attending physically available and evaluated [facility]  I managed the patient along with the ED Attending      Electronically Signed by         Víctor Hinkle MD  09/15/19 5330

## 2019-09-13 NOTE — ASSESSMENT & PLAN NOTE
She as noted to be in the 40s, will hold betablockers and see if it is contributing to her symptoms  Telemetry monitoring

## 2019-09-13 NOTE — ASSESSMENT & PLAN NOTE
Creatinine now is 1 64, there are times the creatinine was higher  Will trend the creatinine  Ensure the patient is not dehydrated  She looks like she is at baseline

## 2019-09-13 NOTE — ED NOTES
Patient transported to [facility], [address] [place] [address]  47/52/36 0508

## 2019-09-13 NOTE — ED NOTES
Pt placed in recliner per multiple complaints of pain in her buttocks       Tay Parker RN  68/47/99 1758

## 2019-09-14 PROBLEM — E11.9 TYPE 2 DIABETES MELLITUS, WITHOUT LONG-TERM CURRENT USE OF INSULIN (HCC): Status: ACTIVE | Noted: 2019-09-14

## 2019-09-14 PROBLEM — R55 NEAR SYNCOPE: Status: ACTIVE | Noted: 2019-09-13

## 2019-09-14 PROBLEM — I25.10 CAD (CORONARY ARTERY DISEASE): Chronic | Status: ACTIVE | Noted: 2019-09-14

## 2019-09-14 PROBLEM — R77.8 ELEVATED TROPONIN: Status: ACTIVE | Noted: 2019-09-14

## 2019-09-14 LAB
ALBUMIN SERPL BCP-MCNC: 2.9 G/DL (ref 3.5–5)
ALP SERPL-CCNC: 18 U/L (ref 46–116)
ALT SERPL W P-5'-P-CCNC: 14 U/L (ref 12–78)
ANION GAP SERPL CALCULATED.3IONS-SCNC: 9 MMOL/L (ref 4–13)
AST SERPL W P-5'-P-CCNC: 29 U/L (ref 5–45)
ATRIAL RATE: 72 BPM
BASOPHILS # BLD AUTO: 0.03 THOUSANDS/ΜL (ref 0–0.1)
BASOPHILS NFR BLD AUTO: 0 % (ref 0–1)
BILIRUB SERPL-MCNC: 0.84 MG/DL (ref 0.2–1)
BUN SERPL-MCNC: 26 MG/DL (ref 5–25)
CALCIUM SERPL-MCNC: 8.1 MG/DL (ref 8.3–10.1)
CHLORIDE SERPL-SCNC: 107 MMOL/L (ref 100–108)
CO2 SERPL-SCNC: 21 MMOL/L (ref 21–32)
CREAT SERPL-MCNC: 1.38 MG/DL (ref 0.6–1.3)
EOSINOPHIL # BLD AUTO: 0.06 THOUSAND/ΜL (ref 0–0.61)
EOSINOPHIL NFR BLD AUTO: 1 % (ref 0–6)
ERYTHROCYTE [DISTWIDTH] IN BLOOD BY AUTOMATED COUNT: 13.3 % (ref 11.6–15.1)
GFR SERPL CREATININE-BSD FRML MDRD: 34 ML/MIN/1.73SQ M
GLUCOSE SERPL-MCNC: 131 MG/DL (ref 65–140)
GLUCOSE SERPL-MCNC: 134 MG/DL (ref 65–140)
GLUCOSE SERPL-MCNC: 145 MG/DL (ref 65–140)
GLUCOSE SERPL-MCNC: 215 MG/DL (ref 65–140)
GLUCOSE SERPL-MCNC: 252 MG/DL (ref 65–140)
HCT VFR BLD AUTO: 36.9 % (ref 34.8–46.1)
HGB BLD-MCNC: 11 G/DL (ref 11.5–15.4)
IMM GRANULOCYTES # BLD AUTO: 0.06 THOUSAND/UL (ref 0–0.2)
IMM GRANULOCYTES NFR BLD AUTO: 1 % (ref 0–2)
LYMPHOCYTES # BLD AUTO: 0.77 THOUSANDS/ΜL (ref 0.6–4.47)
LYMPHOCYTES NFR BLD AUTO: 7 % (ref 14–44)
MAGNESIUM SERPL-MCNC: 1.3 MG/DL (ref 1.6–2.6)
MCH RBC QN AUTO: 29.6 PG (ref 26.8–34.3)
MCHC RBC AUTO-ENTMCNC: 29.8 G/DL (ref 31.4–37.4)
MCV RBC AUTO: 99 FL (ref 82–98)
MONOCYTES # BLD AUTO: 1.4 THOUSAND/ΜL (ref 0.17–1.22)
MONOCYTES NFR BLD AUTO: 13 % (ref 4–12)
NEUTROPHILS # BLD AUTO: 8.84 THOUSANDS/ΜL (ref 1.85–7.62)
NEUTS SEG NFR BLD AUTO: 78 % (ref 43–75)
NRBC BLD AUTO-RTO: 0 /100 WBCS
P AXIS: 38 DEGREES
PLATELET # BLD AUTO: 196 THOUSANDS/UL (ref 149–390)
PMV BLD AUTO: 10.6 FL (ref 8.9–12.7)
POTASSIUM SERPL-SCNC: 4.6 MMOL/L (ref 3.5–5.3)
PR INTERVAL: 160 MS
PROT SERPL-MCNC: 6.3 G/DL (ref 6.4–8.2)
QRS AXIS: -22 DEGREES
QRSD INTERVAL: 124 MS
QT INTERVAL: 526 MS
QTC INTERVAL: 575 MS
RBC # BLD AUTO: 3.72 MILLION/UL (ref 3.81–5.12)
SODIUM SERPL-SCNC: 137 MMOL/L (ref 136–145)
T WAVE AXIS: 115 DEGREES
TROPONIN I SERPL-MCNC: 0.38 NG/ML
VENTRICULAR RATE: 72 BPM
WBC # BLD AUTO: 11.16 THOUSAND/UL (ref 4.31–10.16)

## 2019-09-14 PROCEDURE — 36415 COLL VENOUS BLD VENIPUNCTURE: CPT | Performed by: PHYSICIAN ASSISTANT

## 2019-09-14 PROCEDURE — 93010 ELECTROCARDIOGRAM REPORT: CPT | Performed by: INTERNAL MEDICINE

## 2019-09-14 PROCEDURE — 82948 REAGENT STRIP/BLOOD GLUCOSE: CPT

## 2019-09-14 PROCEDURE — 85025 COMPLETE CBC W/AUTO DIFF WBC: CPT | Performed by: INTERNAL MEDICINE

## 2019-09-14 PROCEDURE — 83735 ASSAY OF MAGNESIUM: CPT | Performed by: INTERNAL MEDICINE

## 2019-09-14 PROCEDURE — 84484 ASSAY OF TROPONIN QUANT: CPT | Performed by: PHYSICIAN ASSISTANT

## 2019-09-14 PROCEDURE — 80053 COMPREHEN METABOLIC PANEL: CPT | Performed by: INTERNAL MEDICINE

## 2019-09-14 PROCEDURE — 99232 SBSQ HOSP IP/OBS MODERATE 35: CPT | Performed by: NURSE PRACTITIONER

## 2019-09-14 PROCEDURE — 99222 1ST HOSP IP/OBS MODERATE 55: CPT | Performed by: INTERNAL MEDICINE

## 2019-09-14 PROCEDURE — 93005 ELECTROCARDIOGRAM TRACING: CPT

## 2019-09-14 RX ADMIN — PANTOPRAZOLE SODIUM 20 MG: 20 TABLET, DELAYED RELEASE ORAL at 05:45

## 2019-09-14 RX ADMIN — FUROSEMIDE 20 MG: 20 TABLET ORAL at 10:00

## 2019-09-14 RX ADMIN — SENNOSIDES 8.6 MG: 8.6 TABLET, FILM COATED ORAL at 10:00

## 2019-09-14 RX ADMIN — INSULIN LISPRO 2 UNITS: 100 INJECTION, SOLUTION INTRAVENOUS; SUBCUTANEOUS at 17:42

## 2019-09-14 RX ADMIN — CLOPIDOGREL BISULFATE 75 MG: 75 TABLET ORAL at 10:00

## 2019-09-14 RX ADMIN — LEVOTHYROXINE SODIUM 75 MCG: 75 TABLET ORAL at 05:45

## 2019-09-14 RX ADMIN — ENOXAPARIN SODIUM 30 MG: 30 INJECTION SUBCUTANEOUS at 10:00

## 2019-09-14 RX ADMIN — ALUMINUM HYDROXIDE, MAGNESIUM HYDROXIDE, AND SIMETHICONE 30 ML: 200; 200; 20 SUSPENSION ORAL at 02:48

## 2019-09-14 RX ADMIN — INSULIN LISPRO 2 UNITS: 100 INJECTION, SOLUTION INTRAVENOUS; SUBCUTANEOUS at 11:41

## 2019-09-14 RX ADMIN — ASPIRIN 81 MG: 81 TABLET, COATED ORAL at 10:00

## 2019-09-14 RX ADMIN — LOSARTAN POTASSIUM 50 MG: 50 TABLET, FILM COATED ORAL at 10:00

## 2019-09-14 RX ADMIN — DOCUSATE SODIUM 100 MG: 100 CAPSULE, LIQUID FILLED ORAL at 10:00

## 2019-09-14 RX ADMIN — ACETAMINOPHEN 650 MG: 325 TABLET ORAL at 20:04

## 2019-09-14 RX ADMIN — CYANOCOBALAMIN TAB 500 MCG 1000 MCG: 500 TAB at 10:00

## 2019-09-14 RX ADMIN — PRAVASTATIN SODIUM 80 MG: 80 TABLET ORAL at 18:38

## 2019-09-14 RX ADMIN — ACETAMINOPHEN 650 MG: 325 TABLET ORAL at 15:22

## 2019-09-14 RX ADMIN — DOCUSATE SODIUM 100 MG: 100 CAPSULE, LIQUID FILLED ORAL at 18:39

## 2019-09-14 NOTE — ED NOTES
Called  to have [facility] call the emergency room d/t the patient's troponin elevation    Awaiting phone call     Jose C Fisher RN  09/13/19 0253

## 2019-09-14 NOTE — ASSESSMENT & PLAN NOTE
No results found for: HGBA1C    Recent Labs     09/13/19  1551 09/14/19  0654   POCGLU 152* 145*       · No recent A1C noted in [facility] chart, will obtain     · Hold home Amaryl, continue with SSI  · Diabetic diet  · Monitor BG and avoid hypoglycemia

## 2019-09-14 NOTE — PLAN OF CARE
Problem: Potential for Falls  Goal: Patient will remain free of falls  Description  INTERVENTIONS:  - Assess patient frequently for physical needs  -  Identify cognitive and physical deficits and behaviors that affect risk of falls    -  [place] fall precautions as indicated by assessment   - Educate patient/family on patient safety including physical limitations  - Instruct patient to call for assistance with activity based on assessment  - Modify environment to reduce risk of injury  - Consider OT/PT consult to assist with strengthening/mobility  Outcome: Progressing     Problem: Prexisting or High Potential for Compromised Skin Integrity  Goal: Skin integrity is maintained or improved  Description  INTERVENTIONS:  - Identify patients at risk for skin breakdown  - Assess and monitor skin integrity  - Assess and monitor nutrition and hydration status  - Monitor labs   - Assess for incontinence   - Turn and reposition patient  - Assist with mobility/ambulation  - Relieve pressure over bony prominences  - Avoid friction and shearing  - Provide appropriate hygiene as needed including keeping skin clean and dry  - Evaluate need for skin moisturizer/barrier cream  - Collaborate with interdisciplinary team   - Patient/family teaching  - Consider wound care consult   Outcome: Progressing     Problem: PAIN - ADULT  Goal: Verbalizes/displays adequate comfort level or baseline comfort level  Description  Interventions:  - Encourage patient to monitor pain and request assistance  - Assess pain using appropriate pain scale  - Administer analgesics based on type and severity of pain and evaluate response  - Implement non-pharmacological measures as appropriate and evaluate response  - Consider cultural and social influences on pain and pain management  - Notify physician/advanced practitioner if interventions unsuccessful or patient reports new pain  Outcome: Progressing     Problem: INFECTION - ADULT  Goal: Absence or prevention of progression during hospitalization  Description  INTERVENTIONS:  - Assess and monitor for signs and symptoms of infection  - Monitor lab/diagnostic results  - Monitor all insertion sites, i e  indwelling lines, tubes, and drains  - Monitor endotracheal if appropriate and nasal secretions for changes in amount and color  - [place] appropriate cooling/warming therapies per order  - Administer medications as ordered  - Instruct and encourage patient and family to use good hand hygiene technique  - Identify and instruct in appropriate isolation precautions for identified infection/condition  Outcome: Progressing  Goal: Absence of fever/infection during neutropenic period  Description  INTERVENTIONS:  - Monitor WBC    Outcome: Progressing     Problem: SAFETY ADULT  Goal: Maintain or return to baseline ADL function  Description  INTERVENTIONS:  -  Assess patient's ability to carry out ADLs; assess patient's baseline for ADL function and identify physical deficits which impact ability to perform ADLs (bathing, care of mouth/teeth, toileting, grooming, dressing, etc )  - Assess/evaluate cause of self-care deficits   - Assess range of motion  - Assess patient's mobility; develop plan if impaired  - Assess patient's need for assistive devices and provide as appropriate  - Encourage maximum independence but intervene and supervise when necessary  - Involve family in performance of ADLs  - Assess for home care needs following discharge   - Consider OT consult to assist with ADL evaluation and planning for discharge  - Provide patient education as appropriate  Outcome: Progressing  Goal: Maintain or return mobility status to optimal level  Description  INTERVENTIONS:  - Assess patient's baseline mobility status (ambulation, transfers, stairs, etc )    - Identify cognitive and physical deficits and behaviors that affect mobility  - Identify mobility aids required to assist with transfers and/or ambulation (gait belt, sit-to-stand, lift, walker, cane, etc )  - [place] fall precautions as indicated by assessment  - Record patient progress and toleration of activity level on Mobility SBAR; progress patient to next Phase/Stage  - Instruct patient to call for assistance with activity based on assessment  - Consider rehabilitation consult to assist with strengthening/weightbearing, etc   Outcome: Progressing     Problem: DISCHARGE PLANNING  Goal: Discharge to home or other facility with appropriate resources  Description  INTERVENTIONS:  - Identify barriers to discharge w/patient and caregiver  - Arrange for needed discharge resources and transportation as appropriate  - Identify discharge learning needs (meds, wound care, etc )  - Arrange for interpretive services to assist at discharge as needed  - Refer to Case Management Department for coordinating discharge planning if the patient needs post-hospital services based on physician/advanced practitioner order or complex needs related to functional status, cognitive ability, or social support system  Outcome: Progressing     Problem: Knowledge Deficit  Goal: Patient/family/caregiver demonstrates understanding of disease process, treatment plan, medications, and discharge instructions  Description  Complete learning assessment and assess knowledge base    Interventions:  - Provide teaching at level of understanding  - Provide teaching via preferred learning methods  Outcome: Progressing

## 2019-09-14 NOTE — ASSESSMENT & PLAN NOTE
· History of PCI in 2014  · Follows with cardiology at [address] [facility]  · Continue ASA and statin

## 2019-09-14 NOTE — ED NOTES
Received phone call from [facility] regarding patient's troponin and new order to redraw one in three hours and if above 1 0 will start on heparin leslie Segura RN  09/13/19 1829

## 2019-09-14 NOTE — PROGRESS NOTES
[facility] [facility]    Progress Note - Jo Trimble 1/3/1932, 80 y o  female MRN: [id number]    Unit/Bed#: [facility] 705-01 Encounter: [id number]    Primary Care Provider: Isidra Diallo MD   Date and time admitted to hospital: 9/13/2019  9:35 AM    * Near syncope  Assessment & Plan  · Sustained a fall in her garage  There is conflicting documentation regarding mechanical fall/tripping and also reporting dizziness  She does state that she "felt like I was going to go down" but denies feeling dizzy or even lightheaded? Reports two other episodes over past two weeks where she felt "fuzzy" however this improved with orange juice  · Noted to have bradycardia with HR in 40's while in ED as well as low BP in 59'H systolic  No further bradycardia noted on telemetry  BG was 138 on arrival    · BB has been held  BP continues to be on soft side and appears to be mildly orthostatic  Was given 1L bolus in ED  · Will monitor orthostatic BP  Hold lasix and losartan  · Check echo  · No events on telemetry, 2 further episodes of bradycardia on telemetry with HR 49  HR now in 80's  · Cardiology consult however etiology likely orthostasis possible hypoglycemia? · Fall precautions, PT/OT        Elevated troponin  Assessment & Plan  · Troponins mildly elevated, flat  0 38/0 47/0 54/0  38   · No chest pain  EKG reportedly without ischemic changes, QTC prolonged on initial EKG per ED documentation  Will repeat now  · Likely NSTEMI type 2 secondary to acute event/fall  Hypertension  Assessment & Plan  · Blood pressure on soft side  Will hold losartan, metoprolol as well as Lasix for now  · Monitor    CKD (chronic kidney disease)  Assessment & Plan  · 1 38 today  Holding ARB and Lasix  Unclear exact baseline however noted to be 1 7 previously  · Monitor     CAD (coronary artery disease)  Assessment & Plan  · History of PCI in 2014  · Follows with cardiology at [facility]  · Continue ASA and statin  Bradyarrhythmia  Assessment & Plan  · 2 other episodes noted this AM however asymptomatic at this time  · Monitor on telemetry  · Hold BB      Type 2 diabetes mellitus, without long-term current use of insulin ([address] )  Assessment & Plan  No results found for: HGBA1C    Recent Labs     09/13/19  1551 09/14/19  0654   POCGLU 152* 145*       · No recent A1C noted in [facility] chart, will obtain  · Hold home Amaryl, continue with SSI  · Diabetic diet  · Monitor BG and avoid hypoglycemia    Hypothyroidism  Assessment & Plan  · Continue levothyroxine  · Check TSH    Pharmacologic: Enoxaparin (Lovenox)  Mechanical VTE Prophylaxis in Place: Yes    Patient Centered Rounds: I have performed bedside rounds with nursing staff today  Discussions with Specialists or Other Care Team Provider: nursing, pharmacist for med list     Education and Discussions with Family / Patient: patient     Time Spent for Care: 30 minutes  More than 50% of total time spent on counseling and coordination of care as described above  Current Length of Stay: 1 day(s)    Current Patient Status: Inpatient   Certification Statement: The patient will continue to require additional inpatient hospital stay due to cardiology consult, monitor HR/BP, echo, PT/OT    Discharge Plan / Estimated Discharge Date: not medically stable  Code Status: Level 1 - Full Code      Subjective:   Patient offers no complaints other than feeling weaker than normal today  Denies any chest pain, shortness of breath, dizziness or lightheadedness  She states that she did not feel dizzy prior to falling at home  She was in the garage states that she turned felt like she was going to go down and did  She also reports 2 other instances over the past 2 weeks where her vision felt blurry while at her daughter's house but this improved with orange juice    She states that she did not trip over anything in the garage this does not appear mechanical     Objective:     Vitals: Temp (24hrs), Av 5 °F (36 4 °C), Min:97 5 °F (36 4 °C), Max:97 5 °F (36 4 °C)    Temp:  [97 5 °F (36 4 °C)] 97 5 °F (36 4 °C)  HR:  [66-86] 75  Resp:  [13-24] 17  BP: ()/() 95/50  SpO2:  [96 %-99 %] 99 %  Body mass index is 26 34 kg/m²  Input and Output Summary (last 24 hours): Intake/Output Summary (Last 24 hours) at 2019 1123  Last data filed at 2019 0601  Gross per 24 hour   Intake 1000 ml   Output 350 ml   Net 650 ml       Physical Exam:     Physical Exam   Constitutional: She is oriented to person, place, and time  No distress  HENT:   Head: Normocephalic  Eyes: Conjunctivae are normal    Neck: Normal range of motion  Cardiovascular: Normal rate  No murmur heard  Pulmonary/Chest: Breath sounds normal    Abdominal: Bowel sounds are normal    Musculoskeletal: Normal range of motion  She exhibits no edema  Neurological: She is alert and oriented to person, place, and time  Skin: Skin is warm  Generalized ecchymosis status post fall   Psychiatric: She has a normal mood and affect  Nursing note and vitals reviewed        Additional Data:     Labs:    Results from last 7 days   Lab Units 19  0650   WBC Thousand/uL 11 16*   HEMOGLOBIN g/dL 11 0*   HEMATOCRIT % 36 9   PLATELETS Thousands/uL 196   NEUTROS PCT % 78*   LYMPHS PCT % 7*   MONOS PCT % 13*   EOS PCT % 1     Results from last 7 days   Lab Units 19  0650   POTASSIUM mmol/L 4 6   CHLORIDE mmol/L 107   CO2 mmol/L 21   BUN mg/dL 26*   CREATININE mg/dL 1 38*   CALCIUM mg/dL 8 1*   ALK PHOS U/L 18*   ALT U/L 14   AST U/L 29             Recent Cultures (last 7 days):           Last 24 Hours Medication List:     Current Facility-Administered Medications:  acetaminophen 650 mg Oral Q4H PRN Anibal Poon MD   aluminum-magnesium hydroxide-simethicone 30 mL Oral Q6H PRN Anibal Poon MD   aspirin 81 mg Oral Daily Anibal Poon MD   cyanocobalamin 1,000 mcg Oral Daily Anibal Poon MD docusate sodium 100 mg Oral BID Vanessa Jones MD   enoxaparin 30 mg Subcutaneous Daily Vanessa Jones MD   insulin lispro 1-5 Units Subcutaneous TID AC Vanessa Jones MD   levothyroxine 75 mcg Oral Daily Vanessa Jones MD   Magnesium Chloride 1 tablet Oral Daily Vanessa Jones MD   ondansetron 4 mg Intravenous Q6H PRN Vanessa Jones MD   oxyCODONE 2 5 mg Oral Q6H PRN Vanessa Jones MD   pantoprazole 20 mg Oral Early Morning Vanessa Jones MD   pravastatin 80 mg Oral Daily With Jodi Ren MD   senna 1 tablet Oral Daily Vanessa Jones MD        Today, Patient Was Seen By: YUSUF Shepherd    ** Please Note: Dragon 360 Dictation voice to text software may have been used in the creation of this document   **

## 2019-09-14 NOTE — ASSESSMENT & PLAN NOTE
· Sustained a fall in her garage  There is conflicting documentation regarding mechanical fall/tripping and also reporting dizziness  She does state that she "felt like I was going to go down" but denies feeling dizzy or even lightheaded? Reports two other episodes over past two weeks where she felt "fuzzy" however this improved with orange juice  · Noted to have bradycardia with HR in 40's while in ED as well as low BP in 64'S systolic  No further bradycardia noted on telemetry  BG was 138 on arrival    · BB has been held  BP continues to be on soft side and appears to be mildly orthostatic  Was given 1L bolus in ED  · Will monitor orthostatic BP  Hold lasix and losartan  · Check echo  · No events on telemetry, 2 further episodes of bradycardia on telemetry with HR 49  HR now in 80's  · Cardiology consult however etiology likely orthostasis possible hypoglycemia?   · Fall precautions, PT/OT

## 2019-09-14 NOTE — ASSESSMENT & PLAN NOTE
· Troponins mildly elevated, flat  0 38/0 47/0 54/0  38   · No chest pain  EKG reportedly without ischemic changes, QTC prolonged on initial EKG per ED documentation  Will repeat now  · Likely NSTEMI type 2 secondary to acute event/fall

## 2019-09-14 NOTE — ASSESSMENT & PLAN NOTE
· 2 other episodes noted this AM however asymptomatic at this time     · Monitor on telemetry  · Hold BB

## 2019-09-14 NOTE — ASSESSMENT & PLAN NOTE
· 1 38 today  Holding ARB and Lasix  Unclear exact baseline however noted to be 1 7 previously     · Monitor
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
## 2019-09-14 NOTE — SOCIAL WORK
Chart reviewed  Met with pt  She reports she resides alone in a 1 story home (3 YVES)  PTA, pt reports she has beeen independent with all adls  She has 2 roller walkers and 2 SPC's  She drives  Her grand daughter Raegan Byrd ([phone])MR a strong support  She denies hx of [facility]  She has a RX plan & uses [address] in [place]  At this time, she denies any d/c needs  CM to follow  CM reviewed d/c planning process including the following: identifying help at home, patient preference for d/c planning needs, Discharge Lounge, [facility] Meds to Bed program, availability of treatment team to discuss questions or concerns patient and/or family may have regarding understanding medications and recognizing signs and symptoms once discharged  CM also encouraged patient to follow up with all recommended appointments after discharge  Patient advised of importance for patient and family to participate in managing patients medical well being

## 2019-09-14 NOTE — ED NOTES
Patient yelling aloud I don't feel well  Went to check on the patient and the patient stated that she felt as if though she were going to pass out  Patient heart rate fell to the mid 40's and blood pressure fell to 86/52  Patient advised to lay back and the patient stated that she started feeling better  Medical resident made aware and new orders given       Maine Deal RN  09/13/19 6403

## 2019-09-15 ENCOUNTER — APPOINTMENT (INPATIENT)
Dept: NON INVASIVE DIAGNOSTICS | Facility: HOSPITAL | Age: 84
DRG: 639 | End: 2019-09-15
Payer: MEDICARE

## 2019-09-15 PROBLEM — R63.4 WEIGHT LOSS: Chronic | Status: ACTIVE | Noted: 2019-09-15

## 2019-09-15 LAB
EST. AVERAGE GLUCOSE BLD GHB EST-MCNC: 160 MG/DL
GLUCOSE SERPL-MCNC: 165 MG/DL (ref 65–140)
GLUCOSE SERPL-MCNC: 185 MG/DL (ref 65–140)
GLUCOSE SERPL-MCNC: 301 MG/DL (ref 65–140)
GLUCOSE SERPL-MCNC: 32 MG/DL (ref 65–140)
GLUCOSE SERPL-MCNC: 49 MG/DL (ref 65–140)
GLUCOSE SERPL-MCNC: 72 MG/DL (ref 65–140)
HBA1C MFR BLD: 7.2 % (ref 4.2–6.3)
TSH SERPL DL<=0.05 MIU/L-ACNC: 1.96 UIU/ML (ref 0.36–3.74)

## 2019-09-15 PROCEDURE — 93306 TTE W/DOPPLER COMPLETE: CPT

## 2019-09-15 PROCEDURE — 83036 HEMOGLOBIN GLYCOSYLATED A1C: CPT | Performed by: NURSE PRACTITIONER

## 2019-09-15 PROCEDURE — 82948 REAGENT STRIP/BLOOD GLUCOSE: CPT

## 2019-09-15 PROCEDURE — 93306 TTE W/DOPPLER COMPLETE: CPT | Performed by: INTERNAL MEDICINE

## 2019-09-15 PROCEDURE — 93005 ELECTROCARDIOGRAM TRACING: CPT

## 2019-09-15 PROCEDURE — 84443 ASSAY THYROID STIM HORMONE: CPT | Performed by: NURSE PRACTITIONER

## 2019-09-15 PROCEDURE — 99232 SBSQ HOSP IP/OBS MODERATE 35: CPT | Performed by: INTERNAL MEDICINE

## 2019-09-15 PROCEDURE — 99232 SBSQ HOSP IP/OBS MODERATE 35: CPT | Performed by: NURSE PRACTITIONER

## 2019-09-15 RX ORDER — SODIUM CHLORIDE 9 MG/ML
75 INJECTION, SOLUTION INTRAVENOUS ONCE
Status: COMPLETED | OUTPATIENT
Start: 2019-09-15 | End: 2019-09-15

## 2019-09-15 RX ORDER — MAGNESIUM SULFATE HEPTAHYDRATE 40 MG/ML
2 INJECTION, SOLUTION INTRAVENOUS ONCE
Status: COMPLETED | OUTPATIENT
Start: 2019-09-15 | End: 2019-09-15

## 2019-09-15 RX ADMIN — ACETAMINOPHEN 650 MG: 325 TABLET ORAL at 01:49

## 2019-09-15 RX ADMIN — INSULIN LISPRO 1 UNITS: 100 INJECTION, SOLUTION INTRAVENOUS; SUBCUTANEOUS at 16:42

## 2019-09-15 RX ADMIN — LEVOTHYROXINE SODIUM 75 MCG: 75 TABLET ORAL at 05:27

## 2019-09-15 RX ADMIN — ENOXAPARIN SODIUM 30 MG: 30 INJECTION SUBCUTANEOUS at 09:48

## 2019-09-15 RX ADMIN — SODIUM CHLORIDE 75 ML/HR: 0.9 INJECTION, SOLUTION INTRAVENOUS at 22:10

## 2019-09-15 RX ADMIN — ASPIRIN 81 MG: 81 TABLET, COATED ORAL at 09:48

## 2019-09-15 RX ADMIN — PANTOPRAZOLE SODIUM 20 MG: 20 TABLET, DELAYED RELEASE ORAL at 05:27

## 2019-09-15 RX ADMIN — DOCUSATE SODIUM 100 MG: 100 CAPSULE, LIQUID FILLED ORAL at 09:48

## 2019-09-15 RX ADMIN — PRAVASTATIN SODIUM 80 MG: 80 TABLET ORAL at 18:18

## 2019-09-15 RX ADMIN — CYANOCOBALAMIN TAB 500 MCG 1000 MCG: 500 TAB at 09:48

## 2019-09-15 RX ADMIN — DOCUSATE SODIUM 100 MG: 100 CAPSULE, LIQUID FILLED ORAL at 18:18

## 2019-09-15 RX ADMIN — SENNOSIDES 8.6 MG: 8.6 TABLET, FILM COATED ORAL at 09:48

## 2019-09-15 RX ADMIN — MAGNESIUM SULFATE HEPTAHYDRATE 2 G: 40 INJECTION, SOLUTION INTRAVENOUS at 19:58

## 2019-09-15 RX ADMIN — MAGNESIUM 64 MG (MAGNESIUM CHLORIDE) TABLET,DELAYED RELEASE 1 MG: at 13:43

## 2019-09-15 RX ADMIN — ACETAMINOPHEN 650 MG: 325 TABLET ORAL at 09:48

## 2019-09-15 RX ADMIN — INSULIN LISPRO 3 UNITS: 100 INJECTION, SOLUTION INTRAVENOUS; SUBCUTANEOUS at 11:44

## 2019-09-15 NOTE — PROGRESS NOTES
General Cardiology Progress Note   Pankaj Thorne 80 y o  female MRN: [id number]  Unit/Bed#: [facility] 705-01 Encounter: [id number]      Assessment:  Principal Problem:    Near syncope  Active Problems:    Bradyarrhythmia    Hypothyroidism    CKD (chronic kidney disease)    Hyperlipidemia    Hypertension    Elevated troponin    CAD (coronary artery disease)    Type 2 diabetes mellitus, without long-term current use of insulin ([facility])      Impression:     Fall, possible lightheadedness/orthostatic/mechanical   Hypotension, initially seen in the ER, now positive orthostatics   Hypertension with history of ARB/beta-blocker/Lasix   Bradycardia-reported in the ER, no clear evidence of this objectively by anything in the chart, vital signs etcetera   Prolonged QT interval-prolonged at baseline due to conduction delay, however do not believe it was as long as 575 milliseconds as recorded yesterday  On no causative medications, heart believe unrecognized prolonged QT interval could exist in an 80year-old    Plan:     Follow-up echo   Continue to withhold antihypertensives and Lasix   IV fluids per [facility]   Change diabetic regimen considering hypoglycemia   We will continue to follow   Repeat EKG for QT assessment      Subjective:   Patient seen and examined  Lightheaded this morning  Orthostatics positive  Hypoglycemic is well    Review of Systems   All other systems reviewed and are negative  Objective   Vitals: Blood pressure (S) (!) 79/58, pulse 80, temperature (!) 97 3 °F (36 3 °C), resp  rate 18, weight 67 4 kg (148 lb 11 2 oz), SpO2 99 %, not currently breastfeeding , Body mass index is 26 34 kg/m² , I/O last 3 completed shifts: In: 9320 [P O :840; IV Piggyback:1000]  Out: 625 [Urine:625]  No intake/output data recorded    Wt Readings from Last 3 Encounters:   09/14/19 67 4 kg (148 lb 11 2 oz)   04/24/19 74 8 kg (165 lb)   04/15/19 77 1 kg (170 lb)       Intake/Output Summary (Last 24 hours) at 9/15/2019 1145  Last data filed at 9/15/2019 0420  Gross per 24 hour   Intake 240 ml   Output 275 ml   Net -35 ml     I/O last 3 completed shifts: In: 8273 [P O :840; IV Piggyback:1000]  Out: 625 [Urine:625]    No significant arrhythmias seen on telemetry review  Physical Exam   Constitutional: She is oriented to person, place, and time  No distress  HENT:   Head: Normocephalic  Eyes: Conjunctivae are normal    Neck: Normal range of motion  Neck supple  No JVD present  Cardiovascular: Normal rate, regular rhythm, normal heart sounds and intact distal pulses  Exam reveals no gallop  No murmur heard  Pulmonary/Chest: Effort normal and breath sounds normal  No respiratory distress  She has no wheezes  She has no rales  Abdominal: Soft  Bowel sounds are normal  She exhibits no distension  There is no tenderness  Musculoskeletal: Normal range of motion  She exhibits no edema  Neurological: She is alert and oriented to person, place, and time  Skin: Skin is warm and dry  She is not diaphoretic         Meds/Allergies   Allergies   Allergen Reactions    Cephalexin     Clindamycin     Doxycycline     Erythromycin Base     Lactate     Levaquin [Levofloxacin]     Penicillins     Procainamide     Quinidine (Non-Therapeutic)     Sulfa Antibiotics        Current Facility-Administered Medications:     acetaminophen (TYLENOL) tablet 650 mg, 650 mg, Oral, Q4H PRN, Jayda Bañuelos MD, 650 mg at 09/15/19 0948    aluminum-magnesium hydroxide-simethicone (MYLANTA) 200-200-20 mg/5 mL oral suspension 30 mL, 30 mL, Oral, Q6H PRN, Jayda Bañuelos MD, 30 mL at 09/14/19 0248    aspirin (ECOTRIN LOW STRENGTH) EC tablet 81 mg, 81 mg, Oral, Daily, Jayda Bañuelos MD, 81 mg at 09/15/19 0948    cyanocobalamin (VITAMIN B-12) tablet 1,000 mcg, 1,000 mcg, Oral, Daily, Jayda Bañuelos MD, 1,000 mcg at 09/15/19 0948    docusate sodium (COLACE) capsule 100 mg, 100 mg, Oral, BID, Sujit GONZALEZ Sander Flanagan MD, 100 mg at 09/15/19 0948    enoxaparin (LOVENOX) subcutaneous injection 30 mg, 30 mg, Subcutaneous, Daily, Rupinder Gross MD, 30 mg at 09/15/19 0948    insulin lispro (HumaLOG) 100 units/mL subcutaneous injection 1-5 Units, 1-5 Units, Subcutaneous, TID AC, 2 Units at 09/14/19 1742 **AND** Fingerstick Glucose (POCT), , , TID AC, Rupinder Gross MD    levothyroxine tablet 75 mcg, 75 mcg, Oral, Daily, Rupinder Gross MD, 75 mcg at 09/15/19 0281    Magnesium Chloride (MAG-DELAY) ER tablet 1 tablet, 1 tablet, Oral, Daily, YUSUF Peterson    magnesium sulfate 2 g/50 mL IVPB (premix) 2 g, 2 g, Intravenous, Once, YUSUF Peterson    ondansetron (ZOFRAN) injection 4 mg, 4 mg, Intravenous, Q6H PRN, Rupinder Gross MD    oxyCODONE (ROXICODONE) IR tablet 2 5 mg, 2 5 mg, Oral, Q6H PRN, Rupinder Gross MD    pantoprazole (PROTONIX) EC tablet 20 mg, 20 mg, Oral, Early Morning, Rupinder Gross MD, 20 mg at 09/15/19 0527    pravastatin (PRAVACHOL) tablet 80 mg, 80 mg, Oral, Daily With Pradeep Sequeira MD, 80 mg at 09/14/19 1838    senna (SENOKOT) tablet 8 6 mg, 1 tablet, Oral, Daily, Rupinder Gross MD, 8 6 mg at 09/15/19 0948    sodium chloride 0 9 % infusion, 75 mL/hr, Intravenous, Once, YUSUF Burger    Laboratory Results:  Results from last 7 days   Lab Units 09/14/19  0203 09/13/19  2212 09/13/19  1920   TROPONIN I ng/mL 0 38* 0 54* 0 47*       CBC with diff:   Results from last 7 days   Lab Units 09/14/19  0650 09/13/19  1612   WBC Thousand/uL 11 16* 9 55   HEMOGLOBIN g/dL 11 0* 11 6   HEMATOCRIT % 36 9 37 6   MCV fL 99* 98   PLATELETS Thousands/uL 196 227   MCH pg 29 6 30 3   MCHC g/dL 29 8* 30 9*   RDW % 13 3 13 4   MPV fL 10 6 11 0   NRBC AUTO /100 WBCs 0 0       CMP:  Results from last 7 days   Lab Units 09/14/19  0650 09/13/19  1612   POTASSIUM mmol/L 4 6 4 1   CHLORIDE mmol/L 107 103   CO2 mmol/L 21 27   BUN mg/dL 26* 34*   CREATININE mg/dL 1 38* 1 64* CALCIUM mg/dL 8 1* 8 4   AST U/L 29 23   ALT U/L 14 17   ALK PHOS U/L 18* 76   EGFR ml/min/1 73sq m 34 28       BMP:  Results from last 7 days   Lab Units 09/14/19  0650 09/13/19  1612   POTASSIUM mmol/L 4 6 4 1   CHLORIDE mmol/L 107 103   CO2 mmol/L 21 27   BUN mg/dL 26* 34*   CREATININE mg/dL 1 38* 1 64*   CALCIUM mg/dL 8 1* 8 4       NT-proBNP: No results for input(s): NTBNP in the last 72 hours  Magnesium:   Results from last 7 days   Lab Units 09/14/19  0650 09/13/19  1612   MAGNESIUM mg/dL 1 3* 1 2*       Coags:       TSH:    Results from last 7 days   Lab Units 09/15/19  1057   TSH 3RD GENERATON uIU/mL 1 960       Hemoglobin A1C )      Lipid Profile:   No results found for: CHOL  No results found for: HDL  No results found for: LDLCALC  No results found for: LDLDIRECT  No results found for: TRIG    Cardiac testing:   EKG personally reviewed by Jason Andujar MD      No results found for this or any previous visit  No results found for this or any previous visit  No results found for this or any previous visit  No results found for this or any previous visit  No results found for this or any previous visit  No results found for this or any previous visit  Jason Andujar MD    Portions of the record may have been created with voice recognition software  Occasional wrong word or "sound a like" substitutions may have occurred due to the inherent limitations of voice recognition software  Read the chart carefully and recognize, using context, where substitutions have occurred

## 2019-09-15 NOTE — ASSESSMENT & PLAN NOTE
· Seems to have improved  No further episodes on telemetry, will d/c    · Cardiology consult noted, BB on hold currently  Will f/u further recommendations however patient can likely d/c on usual dose of Toprol

## 2019-09-15 NOTE — ASSESSMENT & PLAN NOTE
· Will recommend follow up with PCP regarding CT C/A/P   · Patient does not want EGD/colonoscopy at this time  · Will defer treatment of PCP

## 2019-09-15 NOTE — ASSESSMENT & PLAN NOTE
· Sustained a fall in her garage  There is conflicting documentation regarding mechanical fall/tripping and also reporting dizziness  She does state that she "felt like I was going to go down" but denies feeling dizzy or even lightheaded? Reports two other episodes over past two weeks where she felt "fuzzy" however this improved with orange juice  · Noted to have bradycardia with HR in 40's while in ED as well as low BP in 90'K systolic  No further bradycardia noted on telemetry  BG was 138 on arrival    · Traumatic imaging negative  · BB has been held  BP continues to be on soft side and appears to be mildly orthostatic  Was given 1L bolus in ED  · Will monitor orthostatic BP  Hold lasix and losartan  HR now improved  Noted to have hypoglycemia with BG as low as 32  · Cardiology following, agree with holding antihypertensives for now  Follow-up additional Cardiology recommendations although will likely discharge patient on Lasix p r n  Only as well as usual dose of metoprolol  Will likely hold ARB    · Echo pending  · Fall precautions, PT/OT

## 2019-09-15 NOTE — PROGRESS NOTES
[facility]    Progress Note - Frankey Meals 1/3/1932, 80 y o  female MRN: [id number]    Unit/Bed#: [facility] 705-01 Encounter: [id number]    Primary Care Provider: Willey Meckel, MD   Date and time admitted to hospital: 9/13/2019  9:35 AM    * Near syncope  Assessment & Plan  · Sustained a fall in her garage  There is conflicting documentation regarding mechanical fall/tripping and also reporting dizziness  She does state that she "felt like I was going to go down" but denies feeling dizzy or even lightheaded? Reports two other episodes over past two weeks where she felt "fuzzy" however this improved with orange juice  · Noted to have bradycardia with HR in 40's while in ED as well as low BP in 57'R systolic  No further bradycardia noted on telemetry  BG was 138 on arrival    · Traumatic imaging negative  · BB has been held  BP continues to be on soft side and appears to be mildly orthostatic  Was given 1L bolus in ED  · Will monitor orthostatic BP  Hold lasix and losartan  HR now improved  Noted to have hypoglycemia with BG as low as 32  · Cardiology following, agree with holding antihypertensives for now  Follow-up additional Cardiology recommendations although will likely discharge patient on Lasix p r n  Only as well as usual dose of metoprolol  Will likely hold ARB  · Echo pending  · Fall precautions, PT/OT        Elevated troponin  Assessment & Plan  · Troponins mildly elevated, flat  0 38/0 47/0 54/0  38   · No chest pain  EKG reportedly without ischemic changes  QTC is prolonged at 575  Avoid QTC prolonging meds--Dr Misa Capone made aware  · Likely NSTEMI type 2 secondary to acute event/fall  Hypertension  Assessment & Plan  · Blood pressure continues on soft side  Will hold losartan, metoprolol as well as Lasix for now    · Orthostatics pending (ordered Qshift yesterday and not done)  · Monitor    Type 2 diabetes mellitus, without long-term current use of insulin [facility])  Assessment & Plan  No results found for: HGBA1C    Recent Labs     09/14/19  2145 09/15/19  0710 09/15/19  0722 09/15/19  0802   POCGLU 134 32* 49* 72       · No recent A1C noted in [facility] chart, ordered here and pending  · Hold home Amaryl  Would likely hold sulfonylurea at discharge as I suspect that patient may be having intermittent episodes of hypoglycemia contributing to symptoms as mentioned as above as  noted here in hospital as well  Consider alternative agent such as metformin at d/c     · Continue with SSI  · Diabetic diet    CKD (chronic kidney disease)  Assessment & Plan  · 1 38 today  Holding ARB and Lasix  Unclear exact baseline however noted to be 1 7 previously  CAD (coronary artery disease)  Assessment & Plan  · History of PCI in 2014  · Follows with cardiology at [facility]  · Continue ASA and statin  Bradyarrhythmia  Assessment & Plan  · Seems to have improved  No further episodes on telemetry, will d/c    · Cardiology consult noted, BB on hold currently  Will f/u further recommendations however patient can likely d/c on usual dose of Toprol  Hypothyroidism  Assessment & Plan  · Continue levothyroxine  · TSH pending    Hyperlipidemia  Assessment & Plan  Continue with statin    Weight loss  Patient reports about 40 lb weight loss over past 1 year  Reports remote history of EGD/colonoscopy which she thinks was normal  She admits to poor appetite in the setting of stress secondary to her daughter having aggressive cancer  She states that she would refuse and EGD/C-scope but would agree to a CT C/A/P as outpatient  Pharmacologic: Enoxaparin (Lovenox)  Mechanical VTE Prophylaxis in Place: Yes    Patient Centered Rounds: I have performed bedside rounds with nursing staff today  Beltran Parra     Discussions with Specialists or Other Care Team Provider: nursing    Education and Discussions with Family / Patient: patient     Time Spent for Care: 30 minutes    More than 50% of total time spent on counseling and coordination of care as described above  Current Length of Stay: 2 day(s)    Current Patient Status: Inpatient   Certification Statement: The patient will continue to require additional inpatient hospital stay due to Echocardiogram, additional Cardiology recommendations, monitor blood sugar and blood pressure  PT/OT consultations  Discharge Plan / Estimated Discharge Date:  Echocardiogram, additional Cardiology recommendations, monitor blood sugar and blood pressure, PT/OT consultations  Code Status: Level 1 - Full Code      Subjective:   Patient offers no complaints other than some left wrist pain from her fall  Denies any dizziness, lightheadedness  Objective:     Vitals:   Temp (24hrs), Av 4 °F (36 3 °C), Min:97 3 °F (36 3 °C), Max:97 5 °F (36 4 °C)    Temp:  [97 3 °F (36 3 °C)-97 5 °F (36 4 °C)] 97 3 °F (36 3 °C)  HR:  [53-87] 70  Resp:  [16-18] 18  BP: (101-120)/(56-63) 120/56  SpO2:  [98 %-100 %] 100 %  Body mass index is 26 34 kg/m²  Input and Output Summary (last 24 hours): Intake/Output Summary (Last 24 hours) at 9/15/2019 0916  Last data filed at 9/15/2019 0420  Gross per 24 hour   Intake 600 ml   Output 275 ml   Net 325 ml       Physical Exam:     Physical Exam   Constitutional: She is oriented to person, place, and time  No distress  HENT:   Head: Normocephalic  Eyes: Conjunctivae are normal    Neck: Normal range of motion  Cardiovascular: Normal rate  Pulmonary/Chest: Breath sounds normal    Abdominal: Bowel sounds are normal    Musculoskeletal: Normal range of motion  She exhibits no edema  Neurological: She is alert and oriented to person, place, and time  Skin: Skin is warm  Generalized ecchymosis post fall   Psychiatric: She has a normal mood and affect  Nursing note and vitals reviewed        Additional Data:     Labs:    Results from last 7 days   Lab Units 19  0650   WBC Thousand/uL 11 16*   HEMOGLOBIN g/dL 11 0*   HEMATOCRIT % 36 9   PLATELETS Thousands/uL 196   NEUTROS PCT % 78*   LYMPHS PCT % 7*   MONOS PCT % 13*   EOS PCT % 1     Results from last 7 days   Lab Units 09/14/19  0650   POTASSIUM mmol/L 4 6   CHLORIDE mmol/L 107   CO2 mmol/L 21   BUN mg/dL 26*   CREATININE mg/dL 1 38*   CALCIUM mg/dL 8 1*   ALK PHOS U/L 18*   ALT U/L 14   AST U/L 29             Recent Cultures (last 7 days):           Last 24 Hours Medication List:     Current Facility-Administered Medications:  acetaminophen 650 mg Oral Q4H PRN Marco Chinchilla MD   aluminum-magnesium hydroxide-simethicone 30 mL Oral Q6H PRN Marco Chinchilla MD   aspirin 81 mg Oral Daily Marco Chinchilla MD   cyanocobalamin 1,000 mcg Oral Daily Marco Chinchilla MD   docusate sodium 100 mg Oral BID Marco Chinchilla MD   enoxaparin 30 mg Subcutaneous Daily Marco Chinchilla MD   insulin lispro 1-5 Units Subcutaneous TID AC Marco Chinchilla MD   levothyroxine 75 mcg Oral Daily Marco Chinchilla MD   Magnesium Chloride 1 tablet Oral Daily Marco Chinchilla MD   ondansetron 4 mg Intravenous Q6H PRN Marco Chinchilla MD   oxyCODONE 2 5 mg Oral Q6H PRN Marco Chinchilla MD   pantoprazole 20 mg Oral Early Morning Marco Chinchilla MD   pravastatin 80 mg Oral Daily With Sierra Harrison MD   senna 1 tablet Oral Daily Marco Chinchilla MD        Today, Patient Was Seen By: YUSUF Bone    ** Please Note: Dragon 360 Dictation voice to text software may have been used in the creation of this document   **

## 2019-09-15 NOTE — ASSESSMENT & PLAN NOTE
No results found for: HGBA1C    Recent Labs     09/14/19  2145 09/15/19  0710 09/15/19  0722 09/15/19  0802   POCGLU 134 32* 49* 72       · No recent A1C noted in [facility] chart, ordered here and pending  · Hold home Amaryl  Would likely hold sulfonylurea at discharge as I suspect that patient may be having intermittent episodes of hypoglycemia contributing to symptoms as mentioned as above as  noted here in hospital as well   Consider alternative agent such as metformin at d/c     · Continue with SSI  · Diabetic diet

## 2019-09-15 NOTE — ASSESSMENT & PLAN NOTE
· History of PCI in 2014  · Follows with cardiology at [facility]  · Continue ASA and statin

## 2019-09-15 NOTE — ASSESSMENT & PLAN NOTE
· Troponins mildly elevated, flat  0 38/0 47/0 54/0  38   · No chest pain  EKG reportedly without ischemic changes  QTC is prolonged at 575  Avoid QTC prolonging meds--Dr Kalyani Thompson made aware  · Likely NSTEMI type 2 secondary to acute event/fall

## 2019-09-15 NOTE — ASSESSMENT & PLAN NOTE
· Blood pressure continues on soft side  Will hold losartan, metoprolol as well as Lasix for now    · Orthostatics pending (ordered Qshift yesterday and not done)  · Monitor

## 2019-09-16 ENCOUNTER — DOCUMENTATION (OUTPATIENT)
Dept: PHYSICAL THERAPY | Facility: HOSPITAL | Age: 84
End: 2019-09-16

## 2019-09-16 VITALS
TEMPERATURE: 97.5 F | DIASTOLIC BLOOD PRESSURE: 56 MMHG | HEART RATE: 69 BPM | WEIGHT: 148.7 LBS | RESPIRATION RATE: 18 BRPM | SYSTOLIC BLOOD PRESSURE: 126 MMHG | BODY MASS INDEX: 26.34 KG/M2 | OXYGEN SATURATION: 100 %

## 2019-09-16 LAB
ANION GAP SERPL CALCULATED.3IONS-SCNC: 5 MMOL/L (ref 4–13)
ATRIAL RATE: 107 BPM
ATRIAL RATE: 214 BPM
ATRIAL RATE: 78 BPM
ATRIAL RATE: 79 BPM
BUN SERPL-MCNC: 24 MG/DL (ref 5–25)
CALCIUM SERPL-MCNC: 8.1 MG/DL (ref 8.3–10.1)
CHLORIDE SERPL-SCNC: 107 MMOL/L (ref 100–108)
CO2 SERPL-SCNC: 25 MMOL/L (ref 21–32)
CREAT SERPL-MCNC: 1.1 MG/DL (ref 0.6–1.3)
GFR SERPL CREATININE-BSD FRML MDRD: 45 ML/MIN/1.73SQ M
GLUCOSE SERPL-MCNC: 186 MG/DL (ref 65–140)
GLUCOSE SERPL-MCNC: 85 MG/DL (ref 65–140)
GLUCOSE SERPL-MCNC: 89 MG/DL (ref 65–140)
MAGNESIUM SERPL-MCNC: 1.9 MG/DL (ref 1.6–2.6)
P AXIS: 109 DEGREES
P AXIS: 44 DEGREES
P AXIS: 54 DEGREES
POTASSIUM SERPL-SCNC: 4.1 MMOL/L (ref 3.5–5.3)
PR INTERVAL: 160 MS
PR INTERVAL: 186 MS
QRS AXIS: -18 DEGREES
QRS AXIS: -5 DEGREES
QRS AXIS: 1 DEGREES
QRS AXIS: 13 DEGREES
QRSD INTERVAL: 106 MS
QRSD INTERVAL: 112 MS
QRSD INTERVAL: 116 MS
QRSD INTERVAL: 118 MS
QT INTERVAL: 362 MS
QT INTERVAL: 386 MS
QT INTERVAL: 460 MS
QT INTERVAL: 474 MS
QTC INTERVAL: 401 MS
QTC INTERVAL: 407 MS
QTC INTERVAL: 524 MS
QTC INTERVAL: 543 MS
SODIUM SERPL-SCNC: 137 MMOL/L (ref 136–145)
T WAVE AXIS: 105 DEGREES
T WAVE AXIS: 122 DEGREES
T WAVE AXIS: 129 DEGREES
T WAVE AXIS: 135 DEGREES
VENTRICULAR RATE: 65 BPM
VENTRICULAR RATE: 76 BPM
VENTRICULAR RATE: 78 BPM
VENTRICULAR RATE: 79 BPM

## 2019-09-16 PROCEDURE — 99232 SBSQ HOSP IP/OBS MODERATE 35: CPT | Performed by: INTERNAL MEDICINE

## 2019-09-16 PROCEDURE — G8978 MOBILITY CURRENT STATUS: HCPCS

## 2019-09-16 PROCEDURE — 97163 PT EVAL HIGH COMPLEX 45 MIN: CPT

## 2019-09-16 PROCEDURE — 80048 BASIC METABOLIC PNL TOTAL CA: CPT | Performed by: NURSE PRACTITIONER

## 2019-09-16 PROCEDURE — 82948 REAGENT STRIP/BLOOD GLUCOSE: CPT

## 2019-09-16 PROCEDURE — G8979 MOBILITY GOAL STATUS: HCPCS

## 2019-09-16 PROCEDURE — 99239 HOSP IP/OBS DSCHRG MGMT >30: CPT | Performed by: PHYSICIAN ASSISTANT

## 2019-09-16 PROCEDURE — 83735 ASSAY OF MAGNESIUM: CPT | Performed by: NURSE PRACTITIONER

## 2019-09-16 PROCEDURE — 97535 SELF CARE MNGMENT TRAINING: CPT

## 2019-09-16 PROCEDURE — 93010 ELECTROCARDIOGRAM REPORT: CPT | Performed by: INTERNAL MEDICINE

## 2019-09-16 PROCEDURE — G8987 SELF CARE CURRENT STATUS: HCPCS

## 2019-09-16 PROCEDURE — 97166 OT EVAL MOD COMPLEX 45 MIN: CPT

## 2019-09-16 PROCEDURE — G8988 SELF CARE GOAL STATUS: HCPCS

## 2019-09-16 RX ORDER — FUROSEMIDE 20 MG/1
20 TABLET ORAL DAILY PRN
Qty: 30 TABLET | Refills: 0 | Status: ON HOLD | OUTPATIENT
Start: 2019-09-16 | End: 2020-02-19 | Stop reason: ALTCHOICE

## 2019-09-16 RX ADMIN — DOCUSATE SODIUM 100 MG: 100 CAPSULE, LIQUID FILLED ORAL at 08:13

## 2019-09-16 RX ADMIN — ENOXAPARIN SODIUM 30 MG: 30 INJECTION SUBCUTANEOUS at 08:13

## 2019-09-16 RX ADMIN — ASPIRIN 81 MG: 81 TABLET, COATED ORAL at 08:13

## 2019-09-16 RX ADMIN — PANTOPRAZOLE SODIUM 20 MG: 20 TABLET, DELAYED RELEASE ORAL at 06:07

## 2019-09-16 RX ADMIN — INSULIN LISPRO 1 UNITS: 100 INJECTION, SOLUTION INTRAVENOUS; SUBCUTANEOUS at 11:40

## 2019-09-16 RX ADMIN — ACETAMINOPHEN 650 MG: 325 TABLET ORAL at 00:40

## 2019-09-16 RX ADMIN — LEVOTHYROXINE SODIUM 75 MCG: 75 TABLET ORAL at 06:07

## 2019-09-16 RX ADMIN — SENNOSIDES 8.6 MG: 8.6 TABLET, FILM COATED ORAL at 08:13

## 2019-09-16 RX ADMIN — MAGNESIUM 64 MG (MAGNESIUM CHLORIDE) TABLET,DELAYED RELEASE 64 MG: at 13:21

## 2019-09-16 RX ADMIN — CYANOCOBALAMIN TAB 500 MCG 1000 MCG: 500 TAB at 08:13

## 2019-09-16 NOTE — PROGRESS NOTES
Pt belonging brought by security, Meds picked up from pharmacy by nurse and placed in patient belonging bag  Pt aware  21

## 2019-09-16 NOTE — PLAN OF CARE
Problem: Prexisting or High Potential for Compromised Skin Integrity  Goal: Skin integrity is maintained or improved  Description  INTERVENTIONS:  - Identify patients at risk for skin breakdown  - Assess and monitor skin integrity  - Assess and monitor nutrition and hydration status  - Monitor labs   - Assess for incontinence   - Turn and reposition patient  - Assist with mobility/ambulation  - Relieve pressure over bony prominences  - Avoid friction and shearing  - Provide appropriate hygiene as needed including keeping skin clean and dry  - Evaluate need for skin moisturizer/barrier cream  - Collaborate with interdisciplinary team   - Patient/family teaching  - Consider wound care consult   Outcome: Progressing     Problem: PAIN - ADULT  Goal: Verbalizes/displays adequate comfort level or baseline comfort level  Description  Interventions:  - Encourage patient to monitor pain and request assistance  - Assess pain using appropriate pain scale  - Administer analgesics based on type and severity of pain and evaluate response  - Implement non-pharmacological measures as appropriate and evaluate response  - Consider cultural and social influences on pain and pain management  - Notify physician/advanced practitioner if interventions unsuccessful or patient reports new pain  Outcome: Progressing     Problem: INFECTION - ADULT  Goal: Absence or prevention of progression during hospitalization  Description  INTERVENTIONS:  - Assess and monitor for signs and symptoms of infection  - Monitor lab/diagnostic results  - Monitor all insertion sites, i e  indwelling lines, tubes, and drains  - Monitor endotracheal if appropriate and nasal secretions for changes in amount and color  - [place] appropriate cooling/warming therapies per order  - Administer medications as ordered  - Instruct and encourage patient and family to use good hand hygiene technique  - Identify and instruct in appropriate isolation precautions for identified infection/condition  Outcome: Progressing  Goal: Absence of fever/infection during neutropenic period  Description  INTERVENTIONS:  - Monitor WBC    Outcome: Progressing     Problem: SAFETY ADULT  Goal: Maintain or return to baseline ADL function  Description  INTERVENTIONS:  -  Assess patient's ability to carry out ADLs; assess patient's baseline for ADL function and identify physical deficits which impact ability to perform ADLs (bathing, care of mouth/teeth, toileting, grooming, dressing, etc )  - Assess/evaluate cause of self-care deficits   - Assess range of motion  - Assess patient's mobility; develop plan if impaired  - Assess patient's need for assistive devices and provide as appropriate  - Encourage maximum independence but intervene and supervise when necessary  - Involve family in performance of ADLs  - Assess for home care needs following discharge   - Consider OT consult to assist with ADL evaluation and planning for discharge  - Provide patient education as appropriate  Outcome: Progressing  Goal: Maintain or return mobility status to optimal level  Description  INTERVENTIONS:  - Assess patient's baseline mobility status (ambulation, transfers, stairs, etc )    - Identify cognitive and physical deficits and behaviors that affect mobility  - Identify mobility aids required to assist with transfers and/or ambulation (gait belt, sit-to-stand, lift, walker, cane, etc )  - [place] fall precautions as indicated by assessment  - Record patient progress and toleration of activity level on Mobility SBAR; progress patient to next Phase/Stage  - Instruct patient to call for assistance with activity based on assessment  - Consider rehabilitation consult to assist with strengthening/weightbearing, etc   Outcome: Progressing     Problem: Knowledge Deficit  Goal: Patient/family/caregiver demonstrates understanding of disease process, treatment plan, medications, and discharge instructions  Description  Complete learning assessment and assess knowledge base    Interventions:  - Provide teaching at level of understanding  - Provide teaching via preferred learning methods  Outcome: Progressing

## 2019-09-16 NOTE — ASSESSMENT & PLAN NOTE
· Blood pressures remained stable in the 120s over 60s  · Permissive hypertension is acceptable due to orthostatics  · Patient's orthostatics were 140 is lying 130 setting to 70s standing  · Believe this had something to do with fall  · Patient will be discharged on p r n   Lasix and no Arb per Cardiology recommendation

## 2019-09-16 NOTE — ASSESSMENT & PLAN NOTE
· Patient has not had any further episodes of bradycardia  · Patient was seen by Cardiology and they recommended placing patient back on normal dose of metoprolol  · Discharge patient on normal with Toprol and have her follow up with outpatient cardiology

## 2019-09-16 NOTE — ASSESSMENT & PLAN NOTE
Lab Results   Component Value Date    HGBA1C 7 2 (H) 09/15/2019       Recent Labs     09/15/19  1119 09/15/19  1632 09/15/19  2110 09/16/19  0721   POCGLU 301* 165* 185* 89     · Noted to have hypoglycemia with sugars as low as 32  Most recent sugars stable with AM glucose 89  · Will  d/c sufonylurea due to possible hypoglycemia  · Will  Hold metformin administration due to CKD and have patient follow up with PCP

## 2019-09-16 NOTE — ASSESSMENT & PLAN NOTE
· Chronic kidney disease, stage 4 (severe) in the setting of absent kidney as documented in recent OP office visits requiring close monitoring of renal function  · 1 10 today  · Patient will be started again  p r n  Lasix and will d/c ARB according to Cardiology recommendations    · Will have patient follow up with outpatient PCP

## 2019-09-16 NOTE — OCCUPATIONAL THERAPY NOTE
OccupationalTherapy Evaluation     Patient Name: Kam MOSQUERAYZNirR Date: 9/16/2019  Problem List  Principal Problem:    Near syncope  Active Problems:    Bradyarrhythmia    Hypothyroidism    CKD (chronic kidney disease)    Hyperlipidemia    Hypertension    Elevated troponin    CAD (coronary artery disease)    Type 2 diabetes mellitus, without long-term current use of insulin (HCC)    Weight loss    Past Medical History  Past Medical History:   Diagnosis Date    Acquired absence of kidney     Frequency of micturition     GERD (gastroesophageal reflux disease)     Hypertension     Malignant neoplasm of left kidney, except renal pelvis (HCC)     Nocturia     Personal history of other malignant neoplasm of kidney     Renal mass, left     UTI (urinary tract infection)      Past Surgical History  Past Surgical History:   Procedure Laterality Date    RADICAL NEPHRECTOMY Left 2013 09/16/19 1235   Restrictions/Precautions   Weight Bearing Precautions Per Order No   Other Precautions Cognitive; Chair Alarm; Bed Alarm;Multiple lines;Telemetry; Fall Risk   Pain Assessment   Pain Assessment No/denies pain   Pain Score No Pain   Home Living   Type of [address] [place] [address] One level  (3STE)   Bathroom Shower/Tub Tub/shower unit   Bathroom Toilet Raised   Bathroom Equipment Grab bars in [facility] Walker;Cane;Grab bars   Additional Comments Pt resides alone in a [facility] with 3STE  Pt sees her neighbor daily, however neighbor is also in the hospital secondary to a fall and will not be able to provide any assist upon d/c   Pt's granddaughter calls her 7x/day to check on patient, but infrequent checks in person     Prior Function   Level of [place] Independent with ADLs and functional mobility   Lives With Alone   Receives Help From Family   ADL Assistance Independent   IADLs Independent   Falls in the last 6 months   (reports at least 2 falls with at least 1 headstrike)   Vocational Retired   Lifestyle   Autonomy Pt reports being completely I with ADLs, IADLs, and functional mobility with PRN use of rw PTA  Pt is a  and manages her medications  Reciprocal Relationships best friends with her neighbor (currently on the 6th floor after a fall as well)  Very supportive granddaughter   Service to Others retired seamstress   Intrinsic Gratification goes to breakfast with her neighbor daily at the [employer], has over 50 at home   Psychosocial   Psychosocial (WDL) WDL   Subjective   Subjective "So should I call someone to pick me up? I want to go home today"   ADL   Eating Assistance 5  Supervision/Setup   Grooming Assistance 5  Supervision/Setup   UB Bathing Assistance 5  Supervision/Setup   UB Bathing Deficit   (seated)   LB Bathing Assistance 4  Minimal Assistance   LB Bathing Deficit   (seated)   [facility]  5  Supervision/Setup   Bed Mobility   Additional Comments Pt seated in recliner upon therapist entry and bed mobility not assesed  Pt left seated in recliner with all needs within reach and chair alarm activated   Transfers   Sit to Stand 4  Minimal assistance   Additional items Impulsive;Assist x 1;Verbal cues  (CGA)   Stand to Sit 4  Minimal assistance   Additional items Assist x 1; Increased time required;Verbal cues; Impulsive   Additional Comments VCs for safe hand placement and positioning during functional transfers (pulls up on rw)  Pt is minimally impulsive and requires cues for safety and to slow down  Functional Mobility   Functional Mobility 4  Minimal assistance   Additional Comments Ax1 with use of rw  Pt is impulsive, with limited safety awareness  Requires frequent cues to slow down and safely navigate the environment  no major LOB  Pt reporting that she typically is able to ambulate better than she is currently    Pt is easily distracted, requiring additional cues during ambulation   Balance   Static Sitting Good   Dynamic Sitting Fair +   Static Standing Fair   Dynamic Standing [address] -   Activity Tolerance   Activity Tolerance Patient tolerated treatment well   Medical Staff Made Aware Spoke with PT Timmy Giles and LESTER regarding pt's cognitive status and OT d/c recommendations   Nurse Made Aware Pt cleared by ERIKA Cornelius for OT evaluation and OOB mobility   RUE Assessment   RUE Assessment WFL   LUE Assessment   LUE Assessment WFL   Hand Function   Gross Motor Coordination Functional   Sensation   Light Touch No apparent deficits   Vision-Basic Assessment   Current Vision Wears glasses all the time   Visual History Corrective eye surgery  (b/l cataract removal )   Patient Visual Report   (reports no changes in acuity/diplopia)   Vision - Complex Assessment   Ocular Range of Motion WFL   Cognition   Overall Cognitive Status Impaired   Arousal/Participation Alert; Cooperative;Responsive   Attention Attends with cues to redirect   Orientation Level Oriented X4   Memory Decreased recall of precautions;Decreased recall of recent events;Decreased short term memory   Following Commands Follows one step commands with increased time or repetition   Comments Pt is impulsive, limited insight into deficits, limited safety awareness  Pt requires frequent cues for safety completing ADLs, and functional mobility  Pt often repeats stories that she has shared with therapist earlier in the session  Cognition Assessment Tools ACLS   Score 4 2   Assessment   Limitation Decreased ADL status; Decreased Safe judgement during ADL;Decreased cognition;Decreased endurance;Decreased self-care trans;Decreased high-level ADLs   Prognosis Fair   Goals   Patient Goals to go home today   Plan   Treatment Interventions ADL retraining;Functional transfer training; Endurance training;Cognitive reorientation;Patient/family training;Equipment evaluation/education; Compensatory technique education;Continued evaluation; Activityengagement   Goal Expiration Date 09/26/19   Treatment Day 1   OT Frequency 3-5x/wk   Additional Treatment Session   Start Time 1200   End Time 5469   Treatment Assessment Pt is seen for skilled occupational therapy session immediately following initial OT evaluation for formal cognitive assessment  Pt completed the ACLS in a quiet environment and scored 4 2/6  Based on pt's cognitive deficits, pt will require 24/7 supervision upon d/c  See below for further details  Additional Treatment Day 2   Recommendation   Recommendation Geriatric Consult   OT Discharge Recommendation Short Term Rehab  (24/7 supervision, FTD prior to returning to driving)   Equipment Recommended Tub seat with back   OT - OK to Discharge Yes  (if to STR, no if to home)   Barthel Index   Feeding 10   Bathing 0   Grooming Score 5   Dressing Score 5   Bladder Score 10   Bowels Score 10   Toilet Use Score 5   Transfers (Bed/Chair) Score 10   Mobility (Level Surface) Score 10   Stairs Score 0   Barthel Index Score 65     4 2    Administered [place] Cognitive Level Screen (ACLS)  Pt scored 4 2/6 0 indicating 24 Hour supervision is recommended to remove dangerous objects outside the visual field and to solve problems due to minor changes in the environment  Behavior:  Recognizes errors  Identifies problems  Asks for Day/Date  Sequences one activity at a time  Processing speed is slow and may take extra time to complete tasks  Memorization will be very slow  Requires long term repetitive training for new tasks  Keep directions short and concise  Grooming:  Initiates and completes with supplies from familiar accessible locations  Stops and asks for help when an error is made  Expect cuts with use of straight razor  Check every few minutes if left alone  Allow ample time for completion of tasks  Dressing:  Selects clothing items based on striking features like color    May choose to wear a favorite item all the time  May argue with suggestions to change selections  Bathing:  Recognizes need for a bath and may ask if time is appropriate  Collects supplies from a visible location  Follows routine  May miss small hidden areas  Recognizes problem like lack of soap and asks for help  Remove hazards from proximity to bath (ie: electrical appliances)  Walking/exercising:  Ambulates to a familiar location ½ to 1 mile away  May not vary route  May fail to attend to activity or noises outside visual field  May ask for help if lost   May be able to learn a graded exercise program   May become anxious in high stimulus environments (malls, airports, casinos)  May resist going to certain places  Eating:  Initiates coming to table at routine times  Uses utensils  Recognizes errors and asks for help  Attempts to comply with social standards  May have trouble waiting for others  Assist with handling hot foods/liquids  Monitor compliance with special diet  Toileting:  Recognizes difficulties that interfere with toileting routine and asks for help  May be able to report change in bowel or bladder habits  May take much longer than average to complete toileting  Medications:  Initiates taking familiar dose at regular time of day  May not be able to open container and may have incorrect ideas of effects that lead to noncompliance  May not seek assist for problems encountered  Supervise to ensure compliance  Use of adaptive equipment:  Needs help with more complex equipment  May need assistance with fasteners that require fine motor skills  Does not understand the potential hazards of incorrect use  May forget to use equipment  Housekeeping:   Initiates and completes a routine of housekeeping activities  May be able to set priorities but may become fixated on a priority  Cleans, polishes and sweeps one feature at a time  Check for quality of cleaning results    Food preparation: Does not plan for long term food needs  May go to store repeatedly whenever food is desired  May search cabinet for particular food item and ask for help when items are not found  May prepare a simple meal but may not recognize problems  Store all undesirable equipment away from view  Do not leave alone when using dangerous tools/equipment  Spending money:  May be able to set a priority for an expense that is highly valued  May become fixated on item  May resist help  Shopping:  May go to familiar shops but does not make a list or compare prices  Looks in familiar locations for an item  May ask for help when not found  May insist on purchasing item that is unrealistic or impractical   May resist help  May be anxious in high stimulus environments  Laundry:  May sort laundry by color or other simple striking cues  May view as a priority and initiate regularly  Recognizes errors like too much soap but cannot offer solutions  Traveling:  May go through actions slowly  Can sit unaccompanied up to 1 hour on a bus or train with landmarks as a guide when to get off  Asks for assistance if lost, does not alter familiar routes  May become anxious in high stimulus environments (bus, airport terminals)  Telephone:  Dials a new number written down by checking it 1 digit at a time  Writes down information very slowly  Does not consider a persons schedule or time of day when calling  Needs assistance to make calls from unfamiliar telephones  May call emergency or familiar numbers excessively  Driving:  Should NOT operate a motor vehicle  GOALS:  Pt will complete all self-care with Pedro  Pt will complete functional transfers on/off all surfaces with Pedro with use of DME as appropriate and with good safety/balance  Pt will demonstrate G carryover of pt/caregiver education and training as appropriate w/o cues w/ good tolerance to increase safety during functional tasks      Pt will improve functional mobility during ADL/IADL/leisure tasks to Mod I using DME as needed w/ G balance/safety     Pt will participate in simulated IADL management task to increase independence to Mod I w/ G safety and endurance    Pt will be attentive 100% of the time during ongoing cognitive assessment w/ G participation to assist w/ safe d/c planning/recommendations    Maggy Recinos, MATT, OTR/L

## 2019-09-16 NOTE — PLAN OF CARE
Problem: Nutrition/Hydration-ADULT  Goal: Nutrient/Hydration intake appropriate for improving, restoring or maintaining nutritional needs  Description  Monitor and assess patient's nutrition/hydration status for malnutrition  Collaborate with interdisciplinary team and initiate plan and interventions as ordered  Monitor patient's weight and dietary intake as ordered or per policy  Utilize nutrition screening tool and intervene as necessary  Determine patient's food preferences and provide high-protein, high-caloric foods as appropriate       INTERVENTIONS:  - Monitor oral intake, urinary output, labs, and treatment plans  - Assess nutrition and hydration status and recommend course of action  - Evaluate amount of meals eaten  - Assist patient with eating if necessary   - Allow adequate time for meals  - Recommend/ encourage appropriate diets, oral nutritional supplements, and vitamin/mineral supplements  - Order, calculate, and assess calorie counts as needed  - Recommend, monitor, and adjust tube feedings and TPN/PPN based on assessed needs  - Assess need for intravenous fluids  - Provide specific nutrition/hydration education as appropriate  - Include patient/family/caregiver in decisions related to nutrition  Outcome: Adequate for Discharge

## 2019-09-16 NOTE — DISCHARGE INSTRUCTIONS
Follow up with PCP regarding Diabetic medication change, as well as possible CT scan of the chest, abdomen, and pelvis regarding 40 lb weight loss

## 2019-09-16 NOTE — ASSESSMENT & PLAN NOTE
· Near syncope seems likely related to orthostatics and possible hypoglycemia  · Patient with orthostatic pressures 140's lying 130's sitting and 70's standing  · Recent Echo shows EF 10%, LV diastolic function normal, no evidence of aortic stenosis or mitral stenosis  · Patient being followed by Cardiology  They have any recommendations in regards to her blood pressure medications  · Change bp meds to lasix p r n, normal dose metoprolol, no ARB per Cardiology  · Patient has not had any episode of bradycardia recently  · May also be slightly related to hypoglycemia as patient was noted to be hypoglycemic with blood sugars in the 30's   · Will discontinue sulfonylurea  Metformin was being  Previously considered however patient's renal function is borderline for administration of medication  · Will have patient follow up with outpatient PCP  · Patient outpatient PT instead of rehab facility  · Will be picked up by granddaughter today and taken home with visiting PT services

## 2019-09-16 NOTE — SOCIAL WORK
CM met with patient to discuss recommendation for STR  Pt declining STR at this time  Pt states her home is one-level and that her granddaughter and other family are available for support  CM encouraged Pt to look at list and consider rehab, but Pt continued to refuse  Pt agreeable to home therapies  Per Pt request referrals placed to agencies servicing [facility] that have availability this week  Referrals placed in [place]  [facility] able to accept with [facility] this week  Pt reports that granddaughter will provide transport

## 2019-09-16 NOTE — PLAN OF CARE
Problem: PHYSICAL THERAPY ADULT  Goal: Performs mobility at highest level of function for planned discharge setting  See evaluation for individualized goals  Description  Treatment/Interventions: Functional transfer training, LE strengthening/ROM, Therapeutic exercise, Endurance training, Patient/family training, Equipment eval/education, Bed mobility, Gait training, Elevations          See flowsheet documentation for full assessment, interventions and recommendations  Note:   Prognosis: Good  Problem List: Decreased strength, Decreased endurance, Impaired balance, Decreased mobility, Decreased coordination, Decreased cognition, Impaired judgement, Decreased safety awareness, Pain  Assessment: Pt seen for high complexity physical therapy evaluation  Pt is an 81 y/o female w/ history/comorbidities of GERD, HTN, renal cell CA who is now admitted after fall at home, ? dizzy prior  Down x approx 30 min  Dx include syncope, bradycardia, NSTEMI, MARY,  Due to acute medical issues, pain, fall risk, note unstable clinical picture  PT consulted to assess mobility, d/c needs  Pt presents w/ decreased functional mob, standing balance, endurance, B LE strength and coordination, barriers at home  will benefit from skilled PT to correct for the above problems  Given cog concerns, impulsivity, and fall risk, recommend rehab at d/c        Recommendation: (recommend rehab at d/c)     PT - OK to Discharge: (S) Yes(when stable to rehab)    See flowsheet documentation for full assessment

## 2019-09-16 NOTE — UTILIZATION REVIEW
Initial Clinical Review    Admission: Date/Time/Statement: Inpatient Admission Orders (From admission, onward)     Ordered        09/13/19 1738  Inpatient Admission (expected length of stay for this patient Order details is greater than two midnights)  Once                   Orders Placed This Encounter   Procedures    Inpatient Admission (expected length of stay for this patient Order details is greater than two midnights)     Standing Status:   Standing     Number of Occurrences:   1     Order Specific Question:   Admitting Physician     Answer:   Rachel November [[id number]]     Order Specific Question:   Level of Care     Answer:   Med Surg [16]     Order Specific Question:   Estimated length of stay     Answer:   More than 2 Midnights     Order Specific Question:   Certification     Answer:   I certify that inpatient services are medically necessary for this patient for a duration of greater than two midnights  See H&P and MD Progress Notes for additional information about the patient's course of treatment  ED Arrival Information     Expected Arrival Acuity Means of Arrival Escorted By Service Admission Type    - 9/13/2019 09:35 Urgent Ambulance [facility] Hospitalist Urgent    Arrival Complaint    Fall         Chief Complaint   Patient presents with    Fall     Pt states she was in her garage and turned and lost her balance and fell, bracing herself with her arms  Denies LOC, syncope or dizziness prior to fall, denies head strike  L wrist pain     Assessment/Plan: 80 y o  female who presents with dizziness and fall  Patient was okay this morning she walked into her garage  She she felt dizzy, no nausea vomiting and no headache and fell to the ground  She has injured her wrists, there is bruising noted  She remember falling  She did not lose consciousness    She was lying there on the floor for at least 30 minutes before she could get help from her neighbors to bring her to to the hospital by EMS  She was noted to have some bradyarrhythmia as and having dizziness with heart rate of 40s  She was noted to have elevated troponins  She is going to be admitted for telemetry monitoring  Fall  Assessment & Plan  79 yo female with history of DM, HTN, HL, Hypothyroidism, CKD has sustained a fall in her garage  She felt dizzy prior to fall  She was not able to get up, she called for help by pressing car button  She denied any chest pain, shortness of breath and headache  She has eaten, no issues with her blood glucose and she is on hyperglycemics  This is recurrent fall, she was noted to have angelique cardia to 40s with dizziness  She was on betablocker and possible link to bradycardia  Would hold off metoprolol for now  Telemetry monitoring  troponin were elevated, trend more  EKG repeat   Cardiology consult  Bradyarrhythmia  Assessment & Plan  She as noted to be in the 40s, will hold betablockers and see if it is contributing to her symptoms  Telemetry monitoring  Anticipated Length of Stay:  Patient will be admitted on an Inpatient basis with an anticipated length of stay of  More than 2 midnights  Justification for Hospital Stay: fall and possible syncope    9/14 per cardio:  Possible orthostatics syncope, hypovolemia, verses fall due to imbalance when turning around  Acute kidney injury on admission, resolved with IV fluids  CAD with history of NSTEMI and RCA intervention in 2014  History of ischemic cardiomyopathy with recovery of LV function  Periodically lightheadedness  Reported bradycardia without any documented evidence     Plan:     Currently withholding all antihypertensives which is okay for now  Consider restarting half dose ARB tomorrow a m  If blood pressure remains stable  Hold diuretic for now  Echocardiogram in the a m  Will follow up for further recommendations tomorrow, but likely would recommend discharging on only p r n   Lasix, and half dose ARB and usual dose Toprol    9/15 per med:  * Near syncope  Assessment & Plan  · Sustained a fall in her garage  There is conflicting documentation regarding mechanical fall/tripping and also reporting dizziness  She does state that she "felt like I was going to go down" but denies feeling dizzy or even lightheaded? Reports two other episodes over past two weeks where she felt "fuzzy" however this improved with orange juice  · Noted to have bradycardia with HR in 40's while in ED as well as low BP in 99'T systolic  No further bradycardia noted on telemetry  BG was 138 on arrival    · Traumatic imaging negative  · BB has been held  BP continues to be on soft side and appears to be mildly orthostatic  Was given 1L bolus in ED  · Will monitor orthostatic BP  Hold lasix and losartan  HR now improved  Noted to have hypoglycemia with BG as low as 32  · Cardiology following, agree with holding antihypertensives for now  Follow-up additional Cardiology recommendations although will likely discharge patient on Lasix p r n  Only as well as usual dose of metoprolol  Will likely hold ARB  · Echo pending  · Fall precautions, PT/OT  Elevated troponin  Assessment & Plan  · Troponins mildly elevated, flat  0 38/0 47/0 54/0  38   · No chest pain  EKG reportedly without ischemic changes  QTC is prolonged at 575  Avoid QTC prolonging meds--Dr Kumar Boateng made aware  · Likely NSTEMI type 2 secondary to acute event/fall       9/15 per cardio:  · Follow-up echo  · Continue to withhold antihypertensives and Lasix  · IV fluids per [facility]  · Change diabetic regimen considering hypoglycemia  · We will continue to follow  · Repeat EKG for QT assessment    ED Triage Vitals   Temperature Pulse Respirations Blood Pressure SpO2   09/13/19 0943 09/13/19 0940 09/13/19 0940 09/13/19 0940 09/13/19 0940   98 °F (36 7 °C) 94 20 128/60 98 %      Temp Source Heart Rate Source Patient Position - Orthostatic VS BP Location FiO2 (%)   09/13/19 0943 09/13/19 0940 09/13/19 0940 09/13/19 0940 --   Oral Monitor Sitting Right arm       Pain Score       09/13/19 0940       5        Wt Readings from Last 1 Encounters:   09/14/19 67 4 kg (148 lb 11 2 oz)     Additional Vital Signs:   Date/Time  Temp  Pulse  Resp  BP  MAP (mmHg)  SpO2  O2 Device  Patient Position - Orthostatic VS   09/16/19 09:25:08    79    124/61  82  99 %    Standing   09/16/19 09:21:34    73  17  128/59  82  98 %    Sitting   09/16/19 09:17:40    77  18  130/57  81  99 %    Lying   09/16/19 07:46:24  97 8 °F (36 6 °C)  71  19  127/59  82  98 %       09/15/19 21:56:46  97 6 °F (36 4 °C)  67  16  128/64  85  100 %       09/15/19 1904    81  18  114/57    98 %    Standing - Orthostatic VS   09/15/19 1902    82  18  118/57    98 %    Sitting - Orthostatic VS   09/15/19 1900    80  18  112/53    98 %    Lying - Orthostatic VS   09/15/19 16:15:50  97 7 °F (36 5 °C)  83  18  105/51  69  99 %       09/15/19 1045        79/58Abnormal    65      Standing - Orthostatic VS   09/15/19 10:43:26    80    130/77  95  99 %    Sitting - Orthostatic VS   09/15/19 1041        140/64        Lying - Orthostatic VS   09/15/19 07:02:19  97 3 °F (36 3 °C)Abnormal   70  18  120/56  77  100 %       09/15/19 04:20:57    67    118/59  79  100 %       09/15/19 01:48:37    63    114/57  76  100 %       09/14/19 22:24:36  97 3 °F (36 3 °C)Abnormal   53Abnormal   16  115/56  76  99 %           Pertinent Labs/Diagnostic Test Results:     9/13 EKG:Sinus rhythm with Premature atrial complexes  Left bundle branch block    CT spine thoracic wo contrast   Final Result by Jesusita Montero DO (09/13 1528)   No fracture or traumatic malalignment  Findings consistent with DISH  Small pulmonary nodules measuring up to 4 mm  Based on current Fleischner Society 2017 Guidelines on incidental pulmonary nodule, no routine follow-up is needed if the patient is considered low risk for lung cancer    If the patient is considered high    risk for lung cancer, 12 month follow-up non-contrast chest CT is recommended  Mild left upper lobe groundglass density  Correlate clinically for pneumonitis  Short interval follow-up chest films may be helpful  XR forearm 2 views LEFT   Final Result by Olivier Dickinson MD (09/13 1224)   No acute osseous abnormality  XR wrist 3+ views LEFT   Final Result by Olivier Dickinson MD (09/13 1221)   No acute osseous abnormality  Degenerative changes    XR elbow 3+ vw LEFT   Final Result by Olivier Dickinson MD (09/13 1223)   No acute osseous abnormality  Degenerative changes as described  9/15 echo: LEFT VENTRICLE:Systolic function was normal  Ejection fraction was estimated to be 55 %  Jose Bump was paradoxical motion  Daivd Oas was mild regurgitation    AORTIC VALVE:There was mild regurgitation    TRICUSPID VALVE:There was mild regurgitation  Pulmonary artery systolic pressure was within the normal range    Results from last 7 days   Lab Units 09/14/19  0650 09/13/19  1612   WBC Thousand/uL 11 16* 9 55   HEMOGLOBIN g/dL 11 0* 11 6   HEMATOCRIT % 36 9 37 6   PLATELETS Thousands/uL 196 227   NEUTROS ABS Thousands/µL 8 84* 6 77         Results from last 7 days   Lab Units 09/16/19  0727 09/14/19  0650 09/13/19  1612   SODIUM mmol/L 137 137 137   POTASSIUM mmol/L 4 1 4 6 4 1   CHLORIDE mmol/L 107 107 103   CO2 mmol/L 25 21 27   ANION GAP mmol/L 5 9 7   BUN mg/dL 24 26* 34*   CREATININE mg/dL 1 10 1 38* 1 64*   EGFR ml/min/1 73sq m 45 34 28   CALCIUM mg/dL 8 1* 8 1* 8 4   MAGNESIUM mg/dL 1 9 1 3* 1 2*     Results from last 7 days   Lab Units 09/14/19  0650 09/13/19  1612   AST U/L 29 23   ALT U/L 14 17   ALK PHOS U/L 18* 76   TOTAL PROTEIN g/dL 6 3* 6 9   ALBUMIN g/dL 2 9* 3 4*   TOTAL BILIRUBIN mg/dL 0 84 0 61   BILIRUBIN DIRECT mg/dL  --  0 18     Results from last 7 days   Lab Units 09/16/19  1106 09/16/19  0721 09/15/19  2110 09/15/19  1632 09/15/19  1119 09/15/19  0802 09/15/19  0722 09/15/19  0710 09/14/19  2145 09/14/19  1652   POC GLUCOSE mg/dl 186* 89 185* 165* 301* 72 49* 32* 134 215*     Results from last 7 days   Lab Units 09/16/19  0727 09/14/19  0650 09/13/19  1612   GLUCOSE RANDOM mg/dL 85 131 138         Results from last 7 days   Lab Units 09/15/19  1057   HEMOGLOBIN A1C % 7 2*   EAG mg/dl 160     Results from last 7 days   Lab Units 09/14/19  0203 09/13/19  2212 09/13/19  1920 09/13/19  1612   TROPONIN I ng/mL 0 38* 0 54* 0 47* 0 38*             Results from last 7 days   Lab Units 09/15/19  1057   TSH 3RD GENERATON uIU/mL 1 960     ED Treatment:   Medication Administration from 09/13/2019 0935 to 09/14/2019 0209       Date/Time Order Dose Route Action Action by Comments     09/13/2019 1118 oxyCODONE (ROXICODONE) IR tablet 5 mg 5 mg Oral Given Isa Mary RN      26/03/7174 1118 acetaminophen (TYLENOL) tablet 975 mg 975 mg Oral Given Isa Mary RN      28/21/4379 1947 sodium chloride 0 9 % bolus 1,000 mL 0 mL Intravenous Stopped Devora [place], RN      09/13/2019 1747 sodium chloride 0 9 % bolus 1,000 mL 1,000 mL Intravenous [address] Jack Montoya, [address] [place] [address]      09/13/2019 2100 docusate sodium (COLACE) capsule 100 mg 100 mg Oral Not Given Jack Montoya RN Patient states that she can go on her own        Past Medical History:   Diagnosis Date    Acquired absence of kidney     Frequency of micturition     GERD (gastroesophageal reflux disease)     Hypertension     Malignant neoplasm of left kidney, except renal pelvis (HCC)     Nocturia     Personal history of other malignant neoplasm of kidney     Renal mass, left     UTI (urinary tract infection)      Present on Admission:   Near syncope   Bradyarrhythmia   Hypothyroidism   CKD (chronic kidney disease)   Hyperlipidemia   Hypertension   CAD (coronary artery disease)   Weight loss      Admitting Diagnosis: Lightheadedness [R42]  Pulmonary nodules [R91 8]  Elevated troponin [R74 8]  Fall, initial encounter [id number]  [id number]]  Unspecified multiple injuries, initial encounter [T07  XXXA]  Age/Sex: 80 y o  female  Admission Orders:    Current Facility-Administered Medications:  acetaminophen 650 mg Oral Q4H PRN Iza Flanagan MD   aluminum-magnesium hydroxide-simethicone 30 mL Oral Q6H PRN Iza Flanagan MD   aspirin 81 mg Oral Daily Iza Flanagan MD   cyanocobalamin 1,000 mcg Oral Daily Iza Flanagan MD   docusate sodium 100 mg Oral BID Iza Flanagan MD   enoxaparin 30 mg Subcutaneous Daily Iza Flanagan MD   insulin lispro 1-5 Units Subcutaneous TID AC Iza Flanagan MD   levothyroxine 75 mcg Oral Daily Iza Flanagan MD   Magnesium Chloride 1 tablet Oral Daily YUSUF Peterson   ondansetron 4 mg Intravenous Q6H PRN Iza Flanagan MD   oxyCODONE 2 5 mg Oral Q6H PRN Iza Flanagan MD   pantoprazole 20 mg Oral Early Morning Iza Flanagan MD   pravastatin 80 mg Oral Daily With Era Siddiqui MD   senna 1 tablet Oral Daily Iza Flanagan MD     SCD's    IP CONSULT TO 3 [facility] Utilization Review Department  Phone: [phone]; Fax [phone]  [email or web address]  ATTENTION: Please call with any questions or concerns to [phone]  and carefully listen to the prompts so that you are directed to the right person  Send all requests for admission clinical reviews, approved or denied determinations and any other requests to fax [phone]   All voicemails are confidential

## 2019-09-16 NOTE — ASSESSMENT & PLAN NOTE
· Patient is here TSH is within normal limits at 1 96  · Will continue patient on current dose of levothyroxine 75 mcg  · Have her follow up with PCP outpatient

## 2019-09-16 NOTE — PROGRESS NOTES
General Cardiology   Progress Note -  Team One   Marge Turner 80 y o  female MRN: [id number]    Unit/Bed#: [facility] 705-01 Encounter: [id number]    Assessment:  s/p Fall: possible lightheadedness/orthostatic/mechanical/hypoglycemia  Hypotension: initially seen in the ER, + orthostatics on admission  · Orthostatics negative today  CAD: s/p JAMAAL to RCA in 2014 at [facility]  ICM: with recovery of EF from 40% to 55% per Care everywhere at [facility]  · Echocardiogram 09/15/2019 preserved left ventricular systolic function EF of 66%, no WMA, normal RV systolic function, mild MR, mild AI, mild TR  Hypertension: Average /60 with holding of ARB/beta-blocker/Lasix  Bradycardia: reported in the ER, no clear evidence of this objectively by anything in the chart, vital signs, etc  Prolonged QT interval: prolonged at baseline due to conduction delay, improved on repeat EKG  MARY: on admission, resolved with IVFs    Plan/Recommendations:  · Echocardiogram with preserved Bi V systolic function  · Reinitiate beta-blocker  · Would hold diuretics with prn dosing  · Can consider reinitiation of Arb at outpatient follow-up with primary cardiologist at [facility]  Currently, continue to hold  · Diabetic management per primary team considering hypoglycemia  · Patient stable for discharge from cardiac standpoint    Subjective    Patient seen and examined  No acute events overnight  She denies any chest pain, shortness of breath, palpitations, lightheadedness or dizziness  Review of Systems   Constitution: Negative  Negative for chills  Cardiovascular: Negative for chest pain, dyspnea on exertion, leg swelling, near-syncope, orthopnea, palpitations, paroxysmal nocturnal dyspnea and syncope  Respiratory: Negative  Negative for shortness of breath  Gastrointestinal: Negative for diarrhea, nausea and vomiting  Neurological: Negative for dizziness, light-headedness and weakness     Psychiatric/Behavioral: Negative for altered mental status  All other systems reviewed and are negative  Objective:   Vitals: Blood pressure 127/59, pulse 71, temperature 97 8 °F (36 6 °C), resp  rate 19, weight 67 4 kg (148 lb 11 2 oz), SpO2 98 %, not currently breastfeeding ,       Body mass index is 26 34 kg/m²  ,     Systolic (80OAK), ANI:961 , Min:79 , QOP:649     Diastolic (82ODQ), MNF:24, Min:51, Max:77      Intake/Output Summary (Last 24 hours) at 9/16/2019 0834  Last data filed at 9/15/2019 1701  Gross per 24 hour   Intake 240 ml   Output    Net 240 ml     Weight (last 2 days)     Date/Time   Weight    09/14/19 0600   67 4 (148 7)              EKG personally reviewed by Brenda Centeno PA-C  Physical Exam   Constitutional: She is oriented to person, place, and time  She appears well-developed and well-nourished  On room air in NAD   HENT:   Head: Normocephalic and atraumatic  Neck: Normal range of motion  Neck supple  No JVD present  Cardiovascular: Normal rate, regular rhythm, normal heart sounds and intact distal pulses  No murmur heard  Pulmonary/Chest: Effort normal and breath sounds normal  No respiratory distress  She has no wheezes  She has no rales  Abdominal: Soft  Bowel sounds are normal  She exhibits no distension  There is no tenderness  Musculoskeletal: Normal range of motion  She exhibits no edema  Neurological: She is alert and oriented to person, place, and time  Skin: Skin is warm and dry  Psychiatric: She has a normal mood and affect  Her behavior is normal    Nursing note and vitals reviewed        LABORATORY RESULTS  Results from last 7 days   Lab Units 09/14/19  0203 09/13/19  2212 09/13/19  1920   TROPONIN I ng/mL 0 38* 0 54* 0 47*     CBC with diff:   Results from last 7 days   Lab Units 09/14/19  0650 09/13/19  1612   WBC Thousand/uL 11 16* 9 55   HEMOGLOBIN g/dL 11 0* 11 6   HEMATOCRIT % 36 9 37 6   MCV fL 99* 98   PLATELETS Thousands/uL 196 227   MCH pg 29 6 30 3   MCHC g/dL 29 8* 30 9*   RDW % 13 3 13 4 MPV fL 10 6 11 0   NRBC AUTO /100 WBCs 0 0       CMP:  Results from last 7 days   Lab Units 19  0727 19  0650 19  1612   POTASSIUM mmol/L 4 1 4 6 4 1   CHLORIDE mmol/L 107 107 103   CO2 mmol/L 25 21 27   BUN mg/dL 24 26* 34*   CREATININE mg/dL 1 10 1 38* 1 64*   CALCIUM mg/dL 8 1* 8 1* 8 4   AST U/L  --  29 23   ALT U/L  --  14 17   ALK PHOS U/L  --  18* 76   EGFR ml/min/1 73sq m 45 34 28       BMP:  Results from last 7 days   Lab Units 19  0727 19  0650 19  1612   POTASSIUM mmol/L 4 1 4 6 4 1   CHLORIDE mmol/L 107 107 103   CO2 mmol/L 25 21 27   BUN mg/dL 24 26* 34*   CREATININE mg/dL 1 10 1 38* 1 64*   CALCIUM mg/dL 8 1* 8 1* 8 4       No results found for: NTBNP          Results from last 7 days   Lab Units 19  0727 19  0650 19  1612   MAGNESIUM mg/dL 1 9 1 3* 1 2*          Results from last 7 days   Lab Units 09/15/19  1057   HEMOGLOBIN A1C % 7 2*          Results from last 7 days   Lab Units 09/15/19  1057   TSH 3RD GENERATON uIU/mL 1 960             Lipid Profile:   No results found for: CHOL  No results found for: HDL  No results found for: LDLCALC  No results found for: TRIG    Cardiac testing:   Results for orders placed during the hospital encounter of 19   Echo complete with contrast if indicated    Narrative [facility] [address]  [facility] [address] [facility]  [phone]    Transthoracic Echocardiogram  2D, M-mode, Doppler, and Color Doppler    Study date:  15-Sep-2019    Patient: Martin Moser  MR number: [id number]  Account number: [de-identified]  : [id number]  Age: 80 years  Gender: Female  Status: Inpatient  Location: Bedside  Height: 63 in  Weight: 148 7 lb  BP: 79/ 58 mmHg    Indications: Syncope and collapse      Diagnoses: R55  - Syncope and collapse    Sonographer:  Enmanuel Pham RDCS  Primary Physician:  Leilani Phan MD  Referring Physician:  Archana James PA-C  Group:  [facility]  Interpreting Physician:  Corie Mukherjee MD    SUMMARY    LEFT VENTRICLE:  Systolic function was normal  Ejection fraction was estimated to be 55 %  VENTRICULAR SEPTUM:  There was paradoxical motion  MITRAL VALVE:  There was mild regurgitation  AORTIC VALVE:  There was mild regurgitation  TRICUSPID VALVE:  There was mild regurgitation  Pulmonary artery systolic pressure was within the normal range  HISTORY: PRIOR HISTORY: Coronary artery disease, bradyarrhythmia, chronic kidney disease, hyperlipidemia, hypertension, elevated troponin levels, diabetes mellitus type 2, left radical nephrectomy  PROCEDURE: The procedure was performed at the bedside  This was a routine study  The transthoracic approach was used  The study included complete 2D imaging, M-mode, complete spectral Doppler, and color Doppler  The heart rate was 73 bpm,  at the start of the study  Images were obtained from the parasternal, apical, subcostal, and suprasternal notch acoustic windows  Echocardiographic views were limited due to lung interference  Image quality was adequate  LEFT VENTRICLE: Size was normal  Systolic function was normal  Ejection fraction was estimated to be 55 %  Wall thickness was normal  No evidence of apical thrombus  DOPPLER: Left ventricular diastolic function parameters were normal     VENTRICULAR SEPTUM: There was paradoxical motion  RIGHT VENTRICLE: The size was normal  Systolic function was normal  Wall thickness was normal     LEFT ATRIUM: Size was normal     RIGHT ATRIUM: Size was normal     MITRAL VALVE: Valve structure was normal  There was mild thickening  There was calcification, with mild chordal involvement  There was normal leaflet separation  DOPPLER: The transmitral velocity was within the normal range  There was no  evidence for stenosis  There was mild regurgitation  AORTIC VALVE: The valve was trileaflet   Leaflets exhibited mildly increased thickness and normal cuspal separation  DOPPLER: Transaortic velocity was within the normal range  There was no evidence for stenosis  There was mild  regurgitation  TRICUSPID VALVE: The valve structure was normal  There was normal leaflet separation  DOPPLER: The transtricuspid velocity was within the normal range  There was no evidence for stenosis  There was mild regurgitation  Pulmonary artery  systolic pressure was within the normal range  PULMONIC VALVE: Leaflets exhibited normal thickness, no calcification, and normal cuspal separation  DOPPLER: The transpulmonic velocity was within the normal range  There was no significant regurgitation  PERICARDIUM: There was no pericardial effusion  The pericardium was normal in appearance  AORTA: The root exhibited normal size  SYSTEMIC VEINS: IVC: The inferior vena cava was normal in size  SYSTEM MEASUREMENT TABLES    2D mode  Area; 2D mode;: 897 mm2  Area; Mean; Mean value chosen; 2D mode;: 897 mm2  RVIDd (2D): 3 4 cm  RVIDd; Mean (2D): 3 4 cm    Apical four chamber  LV MOD Diam; Recent value; End Diastole (A4C): 3 9 cm  LV MOD Diam; Recent value; End Diastole (A4C): 4 7 cm  LV MOD Diam; Recent value; End Diastole (A4C): 4 81 cm  LV MOD Diam; Recent value; End Diastole (A4C): 4 92 cm  LV MOD Diam; Recent value; End Diastole (A4C): 5 03 cm  LV MOD Diam; Recent value; End Diastole (A4C): 5 14 cm  LV MOD Diam; Recent value; End Diastole (A4C): 5 22 cm  LV MOD Diam; Recent value; End Diastole (A4C): 5 28 cm  LV MOD Diam; Recent value; End Diastole (A4C): 5 28 cm  LV MOD Diam; Recent value; End Diastole (A4C): 5 25 cm  LV MOD Diam; Recent value; End Diastole (A4C): 5 11 cm  LV MOD Diam; Recent value; End Diastole (A4C): 4 81 cm  LV MOD Diam; Recent value; End Diastole (A4C): 2 52 cm  LV MOD Diam; Recent value; End Diastole (A4C): 1 8 cm  LV MOD Diam; Recent value; End Diastole (A4C): 2 63 cm  LV MOD Diam; Recent value; End Diastole (A4C): 3 2 cm  LV MOD Diam; Recent value;  End Diastole (A4C): 3 62 cm  LV MOD Diam; Recent value; End Diastole (A4C): 4 15 cm  LV MOD Diam; Recent value; End Diastole (A4C): 4 39 cm  LV MOD Diam; Recent value; End Diastole (A4C): 4 58 cm  LV MOD Diam; Recent value; End Systole (A4C): 1 04 cm  LV MOD Diam; Recent value; End Systole (A4C): 1 48 cm  LV MOD Diam; Recent value; End Systole (A4C): 1 86 cm  LV MOD Diam; Recent value; End Systole (A4C): 2 21 cm  LV MOD Diam; Recent value; End Systole (A4C): 2 52 cm  LV MOD Diam; Recent value; End Systole (A4C): 2 71 cm  LV MOD Diam; Recent value; End Systole (A4C): 2 9 cm  LV MOD Diam; Recent value; End Systole (A4C): 3 04 cm  LV MOD Diam; Recent value; End Systole (A4C): 3 14 cm  LV MOD Diam; Recent value; End Systole (A4C): 3 17 cm  LV MOD Diam; Recent value; End Systole (A4C): 3 25 cm  LV MOD Diam; Recent value; End Systole (A4C): 3 33 cm  LV MOD Diam; Recent value; End Systole (A4C): 3 47 cm  LV MOD Diam; Recent value; End Systole (A4C): 3 63 cm  LV MOD Diam; Recent value; End Systole (A4C): 3 82 cm  LV MOD Diam; Recent value; End Systole (A4C): 3 91 cm  LV MOD Diam; Recent value; End Systole (A4C): 3 94 cm  LV MOD Diam; Recent value; End Systole (A4C): 3 94 cm  LV MOD Diam; Recent value; End Systole (A4C): 2 05 cm  LV MOD Diam; Recent value; End Systole (A4C): 3 91 cm  LVEF MOD A4C: 61 4 %  Left Ventricle diastolic major axis; Most recent value chosen; Method of Disks, Single Plane; 2D mode; Apical four chamber;: 7 85 cm  Left Ventricle systolic major axis; Most recent value chosen; Method of Disks, Single Plane; 2D mode; Apical four chamber;: 6 26 cm  Left Ventricular Diastolic Area; Most recent value chosen; Method of Disks, Single Plane; 2D mode; Apical four chamber;: 3410 mm2  Left Ventricular End Diastolic Volume; Most recent value chosen; Method of Disks, Single Plane; 2D mode; Apical four chamber;: 121 cm3  Left Ventricular End Systolic Volume;  Most recent value chosen; Method of Disks, Single Plane; 2D mode; Apical four chamber;: 46 7 cm3  Left Ventricular Systolic Area; Most recent value chosen; Method of Disks, Single Plane; 2D mode; Apical four chamber;: 1860 mm2  SI (A4C): 43 5 ml/m2  SV MOD A4C: 74 3 cm3  Right Atrium Systolic Area; End Systole; 2D mode; Apical four chamber;: 1560 mm2  Right Atrium Systolic Area; Mean; Mean value chosen; End Systole; 2D mode; Apical four chamber;: 1560 mm2    M mode  Tricuspid Annular Plane Systolic Excursion; Mean; Mean value chosen; Tricuspid Annulus; M mode;: 2 09 cm  Tricuspid Annular Plane Systolic Excursion; Tricuspid Annulus; M mode;: 2 09 cm    Tissue Doppler Imaging  LV Peak Early Hurst Tissue Tam; Medial MA (TDI): 49 mm/s  Left Ventricular Peak Early Diastolic Tissue Velocity; Mean; Mean value chosen; Medial Mitral Annulus; Tissue Doppler Imaging;: 49 mm/s    Unspecified Scan Mode  MV Peak Tam/LV Peak Tissue Tam E-Wave; Medial MA: 12 4  DT; Antegrade Flow: 354 ms  DT; Mean; Antegrade Flow: 354 ms  Dec [place]; Antegrade Flow: 1710 mm/s2  Dec [place]; Mean; Antegrade Flow: 1710 mm/s2  MV A Tam: 839 mm/s  MV E Tam: 606 mm/s  MV E/A Ratio: 0 7  MV Peak A Tam: 839 mm/s  MV Peak E Tam; Mean; Antegrade Flow: 606 mm/s  MVA (PHT): 212 mm2  PHT: 104 ms  PHT; Mean: 104 ms  Peak Grad; Mean; Regurgitant Flow: 19 mm[Hg]  Vmax; Mean; Regurgitant Flow: 2200 mm/s  Vmax; Regurgitant Flow: 2100 mm/s  Vmax; Regurgitant Flow: 2290 mm/s    [employer] Accredited Echocardiography Laboratory    Prepared and electronically signed by    Lora Wise MD  Signed 15-Sep-2019 14:32:00       No results found for this or any previous visit  No results found for this or any previous visit  No procedure found  No results found for this or any previous visit        Meds/Allergies   all current active meds have been reviewed, current meds:   Current Facility-Administered Medications   Medication Dose Route Frequency    acetaminophen (TYLENOL) tablet 650 mg  650 mg Oral Q4H PRN    aluminum-magnesium hydroxide-simethicone (MYLANTA) 200-200-20 mg/5 mL oral suspension 30 mL  30 mL Oral Q6H PRN    aspirin (ECOTRIN LOW STRENGTH) EC tablet 81 mg  81 mg Oral Daily    cyanocobalamin (VITAMIN B-12) tablet 1,000 mcg  1,000 mcg Oral Daily    docusate sodium (COLACE) capsule 100 mg  100 mg Oral BID    enoxaparin (LOVENOX) subcutaneous injection 30 mg  30 mg Subcutaneous Daily    insulin lispro (HumaLOG) 100 units/mL subcutaneous injection 1-5 Units  1-5 Units Subcutaneous TID AC    levothyroxine tablet 75 mcg  75 mcg Oral Daily    Magnesium Chloride (MAG-DELAY) ER tablet 1 tablet  1 tablet Oral Daily    ondansetron (ZOFRAN) injection 4 mg  4 mg Intravenous Q6H PRN    oxyCODONE (ROXICODONE) IR tablet 2 5 mg  2 5 mg Oral Q6H PRN    pantoprazole (PROTONIX) EC tablet 20 mg  20 mg Oral Early Morning    pravastatin (PRAVACHOL) tablet 80 mg  80 mg Oral Daily With Dinner    senna (SENOKOT) tablet 8 6 mg  1 tablet Oral Daily    and PTA meds:   Prior to Admission Medications   Prescriptions Last Dose Informant Patient Reported? Taking?    Cyanocobalamin (B-12) 1000 MCG CAPS  Self Yes No   Sig: Take 1 tablet by mouth daily   aspirin 81 MG tablet  Self Yes Yes   Sig: Take 1 tablet by mouth daily   docusate sodium (COLACE) 100 mg capsule  Self Yes No   Sig: Take 1 capsule by mouth 2 (two) times a day   furosemide (LASIX) 20 mg tablet   Yes Yes   Sig: Take 20 mg by mouth every other day    glimepiride (AMARYL) 4 mg tablet   Yes No   Si mg every morning   levothyroxine 75 mcg tablet  Self Yes No   Sig: Take 1 tablet by mouth daily   losartan (COZAAR) 100 MG tablet  Self Yes Yes   Sig: Take 1 tablet by mouth daily   magnesium chloride (MAG64) 64 MG TBEC EC tablet   Yes No   Sig: Take 1 tablet by mouth   metoprolol succinate (TOPROL-XL) 25 mg 24 hr tablet  Self Yes Yes   Sig: Take 1 tablet by mouth daily   mupirocin (BACTROBAN) 2 % ointment   No No   Sig: Apply topically 3 (three) times a day for 10 days omeprazole (PRILOSEC) 20 mg delayed release capsule  Self Yes No   Sig: Take 1 capsule by mouth daily   simvastatin (ZOCOR) 40 mg tablet  Self Yes No   Sig: Take 1 tablet by mouth daily      Facility-Administered Medications: None     Medications Prior to Admission   Medication    aspirin 81 MG tablet    furosemide (LASIX) 20 mg tablet    losartan (COZAAR) 100 MG tablet    metoprolol succinate (TOPROL-XL) 25 mg 24 hr tablet    Cyanocobalamin (B-12) 1000 MCG CAPS    docusate sodium (COLACE) 100 mg capsule    glimepiride (AMARYL) 4 mg tablet    levothyroxine 75 mcg tablet    magnesium chloride (MAG64) 64 MG TBEC EC tablet    mupirocin (BACTROBAN) 2 % ointment    omeprazole (PRILOSEC) 20 mg delayed release capsule    simvastatin (ZOCOR) 40 mg tablet       Counseling / Coordination of Care  Total floor / unit time spent today 20 minutes  Greater than 50% of total time was spent with the patient and / or family counseling and / or coordination of care  ** Please Note: Dragon 360 Dictation voice to text software may have been used in the creation of this document   **

## 2019-09-16 NOTE — PHYSICAL THERAPY NOTE
Physical Therapy Evaluation    Patient's Name: Cornelia Newman    Admitting Diagnosis  Lightheadedness [R42]  Pulmonary nodules [R91 8]  Elevated troponin [R74 8]  Fall, initial encounter [W19  XXXA]  Unspecified multiple injuries, initial encounter [T07  XXXA]    Problem List  Patient Active Problem List   Diagnosis    Cholelithiasis    Near syncope    Bradyarrhythmia    Hypothyroidism    CKD (chronic kidney disease)    Hyperlipidemia    Hypertension    Elevated troponin    CAD (coronary artery disease)    Type 2 diabetes mellitus, without long-term current use of insulin (HCC)    Weight loss       Past Medical History  Past Medical History:   Diagnosis Date    Acquired absence of kidney     Frequency of micturition     GERD (gastroesophageal reflux disease)     Hypertension     Malignant neoplasm of left kidney, except renal pelvis (HCC)     Nocturia     Personal history of other malignant neoplasm of kidney     Renal mass, left     UTI (urinary tract infection)        Past Surgical History  Past Surgical History:   Procedure Laterality Date    RADICAL NEPHRECTOMY Left 2013 09/16/19 0900   Note Type   Note type Eval only   Pain Assessment   Pain Assessment 0-10   Pain Score 3   Pain Type Acute pain   Pain Location Head   Patient's Stated Pain Goal No pain   Hospital Pain Intervention(s) Ambulation/increased activity   Response to Interventions unchanged   Home Living   Type of Home House   Additional Comments Resides alone in [address] [place] home, 3 [place]  Neighbor checks on pt wagner, but she is in hospital presently and cannot assist at d/c   Granddtr calls frequently  ambulates w/ cane, also has RW   Prior Function   Level of Hun[place] Independent with ADLs and functional mobility   Lives With Alone   Falls in the last 6 months 1 to 4   Restrictions/Precautions   Weight Bearing Precautions Per Order No   Other Precautions Bed Alarm; Chair Alarm;Cognitive;Multiple lines; Fall Risk;Pain   General   Family/Caregiver Present No   Cognition   Overall Cognitive Status Impaired   Arousal/Participation Responsive   Attention Attends with cues to redirect   Orientation Level Oriented X4   Memory Unable to assess   Following Commands Follows one step commands inconsistently   RLE Assessment   RLE Assessment   (strength grossly 4-/5)   LLE Assessment   LLE Assessment   (strength grossly 4-/5)   Coordination   Movements are Fluid and Coordinated 0   Coordination and Movement Description B LE ataxia   Transfers   Sit to Stand 4  Minimal assistance   Additional items Assist x 1   Stand to Sit 4  Minimal assistance   Additional items Assist x 1   Ambulation/Elevation   Gait pattern   (B LE ataxia, veers strongly to L, several LOB)   Gait Assistance 4  Minimal assist   Additional items Assist x 1   Assistive Device Rolling walker   Distance 120'x3, multiple standing rests to correct gait pattern   Balance   Static Sitting Good   Dynamic Sitting Fair   Static Standing Fair -   Dynamic Standing Fair -   Ambulatory Fair -   Endurance Deficit   Endurance Deficit Yes   Activity Tolerance   Activity Tolerance Patient limited by fatigue;Patient limited by pain;Treatment limited secondary to medical complications (Comment)   Nurse Made Aware yes   Assessment   Prognosis Good   Problem List Decreased strength;Decreased endurance; Impaired balance;Decreased mobility; Decreased coordination;Decreased cognition; Impaired judgement;Decreased safety awareness;Pain   Assessment Pt seen for high complexity physical therapy evaluation  Pt is an 79 y/o female w/ history/comorbidities of GERD, HTN, renal cell CA who is now admitted after fall at home, ? dizzy prior  Down x approx 30 min  Dx include syncope, bradycardia, NSTEMI, MARY,  Due to acute medical issues, pain, fall risk, note unstable clinical picture  PT consulted to assess mobility, d/c needs    Pt presents w/ decreased functional mob, standing balance, endurance, B LE strength and coordination, barriers at home  will benefit from skilled PT to correct for the above problems  Given cog concerns, impulsivity, and fall risk, recommend rehab at d/c   Goals   Patient Goals to go home   STG Expiration Date 09/30/19   Short Term Goal #1 1-2 wks: bed mob and transfers w/ indep, standing balance to good/normal w/ device, ambulate 200-300 ft w/ RW and mod I, increase B LE strength by 1/2 -1 grade, ambulate 1 flight of stairs w/ S   Treatment Day 0   Plan   Treatment/Interventions Functional transfer training;LE strengthening/ROM; Therapeutic exercise; Endurance training;Patient/family training;Equipment eval/education; Bed mobility;Gait training;Elevations   PT Frequency   (3-5x/wk)   Recommendation   Recommendation   (recommend rehab at d/c)   PT - OK to Discharge Yes  (when stable to rehab)   Modified Harsh Scale   Modified Harsh Scale 4   Barthel Index   Feeding 10   Bathing 0   Grooming Score 5   Dressing Score 5   Bladder Score 10   Bowels Score 10   Toilet Use Score 5   Transfers (Bed/Chair) Score 10   Mobility (Level Surface) Score 10   Stairs Score 0   Barthel Index Score 65       Martita Valentin PT, DPT, CSRS

## 2019-09-16 NOTE — ASSESSMENT & PLAN NOTE
· Non MI trop elevation in the setting of fall/MARY as evidenced by flat troponins, no ischemic changes on EKG, no chest pain  · Troponins mildly elevated, flat  0 38/0 47/0 54/0  38   · Patient denies chest pain, shortness of breath, or other anginal symptoms  · QTC prolonged  Was previously 18 has come down to 543 now  · Left patient follow up with outpatient cardiology at [facility] Dr Devi Cerna

## 2019-09-16 NOTE — DISCHARGE SUMMARY
Discharge- Marge Turner 1/3/1932, 80 y o  female MRN: [id number]    Unit/Bed#: [facility] 705-01 Encounter: [id number]    Primary Care Provider: Keren Ferrell MD   Date and time admitted to hospital: 9/13/2019  9:35 AM        * Near syncope  Assessment & Plan  · Near syncope seems likely related to orthostatics and possible hypoglycemia  · Patient with orthostatic pressures 140's lying 130's sitting and 70's standing  · Recent Echo shows EF 98%, LV diastolic function normal, no evidence of aortic stenosis or mitral stenosis  · Patient being followed by Cardiology  They have any recommendations in regards to her blood pressure medications  · Change bp meds to lasix p r n, normal dose metoprolol, no ARB per Cardiology  · Patient has not had any episode of bradycardia recently  · May also be slightly related to hypoglycemia as patient was noted to be hypoglycemic with blood sugars in the 30's   · Will discontinue sulfonylurea  Metformin was being  Previously considered however patient's renal function is borderline for administration of medication  · Will have patient follow up with outpatient PCP  · Patient outpatient PT instead of rehab facility  · Will be picked up by granddaughter today and taken home with visiting PT services  Type 2 diabetes mellitus, without long-term current use of insulin [facility])  Assessment & Plan  Lab Results   Component Value Date    HGBA1C 7 2 (H) 09/15/2019       Recent Labs     09/15/19  1119 09/15/19  1632 09/15/19  2110 09/16/19  0721   POCGLU 301* 165* 185* 89     · Noted to have hypoglycemia with sugars as low as 32  Most recent sugars stable with AM glucose 89  · Will  d/c sufonylurea due to possible hypoglycemia  · Will  Hold metformin administration due to CKD and have patient follow up with PCP       Elevated troponin  Assessment & Plan  · Non MI trop elevation in the setting of fall/MARY as evidenced by flat troponins, no ischemic changes on EKG, no chest pain  · Troponins mildly elevated, flat  0 38/0 47/0 54/0  38   · Patient denies chest pain, shortness of breath, or other anginal symptoms  · QTC prolonged  Was previously 18 has come down to 543 now  · Left patient follow up with outpatient cardiology at [facility] Dr Christina Fajardo  CKD (chronic kidney disease)  Assessment & Plan  · Chronic kidney disease, stage 4 (severe) in the setting of absent kidney as documented in recent OP office visits requiring close monitoring of renal function  · 1 10 today  · Patient will be started again  p r n  Lasix and will d/c ARB according to Cardiology recommendations  · Will have patient follow up with outpatient PCP    CAD (coronary artery disease)  Assessment & Plan  · History of PCI in 2014  · Patient follow-up with outpatient cardiologist [address] [facility]  · Continue aspirin and statin    Weight loss  Assessment & Plan    · Will recommend follow up with PCP regarding CT C/A/P   · Patient does not want EGD/colonoscopy at this time  · Will defer treatment of PCP  Bradyarrhythmia  Assessment & Plan  · Patient has not had any further episodes of bradycardia  · Patient was seen by Cardiology and they recommended placing patient back on normal dose of metoprolol  · Discharge patient on normal with Toprol and have her follow up with outpatient cardiology  Hypertension  Assessment & Plan  · Blood pressures remained stable in the 120s over 60s  · Permissive hypertension is acceptable due to orthostatics  · Patient's orthostatics were 140 is lying 130 setting to 70s standing  · Believe this had something to do with fall  · Patient will be discharged on p r n  Lasix and no Arb per Cardiology recommendation    Hypothyroidism  Assessment & Plan  · Patient is here TSH is within normal limits at 1 96  · Will continue patient on current dose of levothyroxine 75 mcg  · Have her follow up with PCP outpatient      Hyperlipidemia  Assessment & Plan  · Continue on statin  Discharging Physician / Practitioner: Radha Hamilton PA-C  PCP: Denilson Ayala MD  Admission Date:   Admission Orders (From admission, onward)     Ordered        09/13/19 1738  Inpatient Admission (expected length of stay for this patient Order details is greater than two midnights)  Once                   Discharge Date: 09/16/19    Resolved Problems  Date Reviewed: 9/15/2019    None          Consultations During Hospital Stay:  · Cardiology  · Social work    Procedures Performed:   · None    Significant Findings / Test Results:   · CT thoracic spine without contrast- No fracture or traumatic malalignment  Findings consistent with DISH  Small pulmonary nodules measuring up to 4 mm  Based on current Fleischner Society 2017 Guidelines on incidental pulmonary nodule, no routine follow-up is needed if the patient is considered low risk for lung cancer   If the patient is considered high risk for lung cancer, 12 month follow-up non-contrast chest CT is recommended  Mild left upper lobe groundglass density   Correlate clinically for pneumonitis   Short interval follow-up chest films may be helpful  · X-ray of forearm two views left-No acute osseous abnormality  · X-ray of the wrist three views left-No acute osseous abnormality  Degenerative changes as described  · X-ray of the elbow three view left-No acute osseous abnormality  Degenerative changes as described  · Echocardiogram- systolic function was normal   Ejection fraction was estimated to be 55%  There was mild mitral regurgitation  Mild aortic valve regurgitation  There is mild tricuspid regurgitation  Pulmonary artery systolic pressures within normal range  No evidence of left ventricular apical thrombus  Left ventricular diastolic function parameters were normal   · EKG- 1st EKG showed  2nd EKG showed     Incidental Findings:   · CT thoracic spine without contrast-Small pulmonary nodules measuring up to 4 mm  Based on current Fleischner Society 2017 Guidelines on incidental pulmonary nodule, no routine follow-up is needed if the patient is considered low risk for lung cancer   If the patient is considered high risk for lung cancer, 12 month follow-up non-contrast chest CT is recommended  Mild left upper lobe groundglass density   Correlate clinically for pneumonitis   Short interval follow-up chest films may be helpful  · EKG- QTC a 1st , QTC of 2nd     Test Results Pending at Discharge (will require follow up): · None      Outpatient Tests Requested:  · None     Complications:  None     Reason for Admission: near syncope     Hospital Course:     Meena Orona is a 80 y o  female patient who originally presented to the hospital on 9/13/2019 due to near-syncope  Patient was admitted to the hospital on 09/28 for near syncopal episode which she had in her garage  She states that she fell and hit her wrist   Patient had a workup in the emergency department which included CT scan of the thoracic vertebrae the x-rays of the wrist and elbow  All of which were negative except for incidental findings on CT scan discussed above  Patient was admitted to the inpatient service  It was noted that the patient was slightly bradycardic with heart rates in the 40s  This prompted her beta-blocker therapy to be held and Cardiology to be consulted  Patient was also noted to have low blood sugars with 1 sugar in the 30s and other in the 40s  It was thought that near syncope could be due to possible hypoglycemia mixed with orthostatics  Orthostatics blood pressures were obtained and was found that patient was severely orthostatic with standing pressures in the 70s and lying pressures in the 140s  Due to hypoglycemia patient's sulfonylurea was held  An echocardiogram of the heart was obtained which showed no abnormal results    Patient was seen by Cardiology who recommended patient to continue outpatient metoprolol half dose of Arb and p r n  Lasix as they felt these medications could possibly have contributed to orthostatic hypotension  It was felt that patient's hypoglycemia could also have contributed to near syncope so her sulfonylurea will be held and so she can follow up with outpatient PCP who can make a decision on her diabetic medications  Please see above list of diagnoses and related plan for additional information  Condition at Discharge: good     Discharge Day Visit / Exam:     Subjective:  Patient states that she is feeling better today and does not have any episodes of dizziness  She denies any fevers, chills, chest pain, shortness of breath, dizziness, lightheadedness  She states that when she is standing up she is no longer dizzy  Vitals: Blood Pressure: 124/61 (09/16/19 0925)  Pulse: 79 (09/16/19 0925)  Temperature: 97 8 °F (36 6 °C) (09/16/19 0746)  Temp Source: Oral (09/13/19 0943)  Respirations: 17 (09/16/19 0921)  Weight - Scale: 67 4 kg (148 lb 11 2 oz) (09/14/19 0600)  SpO2: 99 % (09/16/19 0925)  Exam:   Physical Exam   Constitutional: She is oriented to person, place, and time  She appears well-developed and well-nourished  HENT:   Head: Normocephalic and atraumatic  Eyes: EOM are normal    Cardiovascular: Normal rate, regular rhythm and normal heart sounds  No murmur heard  Pulmonary/Chest: Effort normal and breath sounds normal  She has no wheezes  She has no rales  Abdominal: Soft  Bowel sounds are normal  She exhibits no distension  There is no tenderness  Musculoskeletal: She exhibits no edema  Patient has bilateral ecchymoses on upper extremities status post fall  She has a bandage on her left wrist/forearm  Neurological: She is alert and oriented to person, place, and time  She has normal strength  No cranial nerve deficit or sensory deficit  Skin: Skin is warm and dry  Nursing note and vitals reviewed      Discussion with Family: patient     Discharge instructions/Information to patient and family:   See after visit summary for information provided to patient and family  Provisions for Follow-Up Care:  See after visit summary for information related to follow-up care and any pertinent home health orders  Disposition:     Home with VNA Services (Reminder: Complete face to face encounter)    For Discharges to [facility] SNF:   · Not Applicable to this Patient - Not Applicable to this Patient    Planned Readmission: none      Discharge Statement:  I spent 45 minutes discharging the patient  This time was spent on the day of discharge  I had direct contact with the patient on the day of discharge  Greater than 50% of the total time was spent examining patient, answering all patient questions, arranging and discussing plan of care with patient as well as directly providing post-discharge instructions  Additional time then spent on discharge activities  Discharge Medications:  See after visit summary for reconciled discharge medications provided to patient and family        ** Please Note: This note has been constructed using a voice recognition system **

## 2019-09-16 NOTE — PROGRESS NOTES
Called Alissa (pt granddaughter) to notify her to  pt in D/[facility]  Left voicemail       Phone number: [phone]

## 2019-09-16 NOTE — PLAN OF CARE
Problem: OCCUPATIONAL THERAPY ADULT  Goal: Performs self-care activities at highest level of function for planned discharge setting  See evaluation for individualized goals  Description  Treatment Interventions: ADL retraining, Functional transfer training, Endurance training, Cognitive reorientation, Patient/family training, Equipment evaluation/education, Compensatory technique education, Continued evaluation, Activityengagement  Equipment Recommended: Tub seat with back       See flowsheet documentation for full assessment, interventions and recommendations      Note:   Limitation: Decreased ADL status, Decreased Safe judgement during ADL, Decreased cognition, Decreased endurance, Decreased self-care trans, Decreased high-level ADLs  Prognosis: Fair     Recommendation: Geriatric Consult  OT Discharge Recommendation: Short Term Rehab(24/7 supervision, FTD prior to returning to driving)  OT - OK to Discharge: Yes(if to [facility], no if to home)

## 2019-09-16 NOTE — ASSESSMENT & PLAN NOTE
· History of PCI in 2014  · Patient follow-up with outpatient cardiologist [address]  · Continue aspirin and statin

## 2019-09-16 NOTE — PROGRESS NOTES
Pt Assist x1 with Roller Walker for ambulation in the halls  Educated/encouraged pt to ambulate with assistance 3-4 x's/day  Chair alarm on  Pt callbell, phone/tray within reach

## 2019-09-24 ENCOUNTER — TELEPHONE (OUTPATIENT)
Dept: UROLOGY | Facility: MEDICAL CENTER | Age: 84
End: 2019-09-24

## 2019-09-24 NOTE — TELEPHONE ENCOUNTER
Patient of Dr Forrest Barboza seen in [facility]  Dr Jose Cox from [facility] calling to advise she believes the patients renal cancer has returned  She is asking for a call back to discuss     Can be reached at [phone]

## 2019-09-24 NOTE — TELEPHONE ENCOUNTER
Reviewed the patient's recent imaging studies with Dr Gary Arizmendi  The liver lesion has increased in size from 9 mm to 1 8 mm in 6 months  Certainly concern about a metastatic lesion  Based on her overall health status, will monitor it for now and I will discuss this with her when she returns to see me next month

## 2019-10-30 ENCOUNTER — OFFICE VISIT (OUTPATIENT)
Dept: UROLOGY | Facility: MEDICAL CENTER | Age: 84
End: 2019-10-30
Payer: MEDICARE

## 2019-10-30 VITALS
BODY MASS INDEX: 25.69 KG/M2 | WEIGHT: 145 LBS | SYSTOLIC BLOOD PRESSURE: 120 MMHG | HEIGHT: 63 IN | DIASTOLIC BLOOD PRESSURE: 74 MMHG | HEART RATE: 71 BPM

## 2019-10-30 DIAGNOSIS — Z90.5 HISTORY OF LEFT RADICAL NEPHRECTOMY: ICD-10-CM

## 2019-10-30 DIAGNOSIS — C78.7 LIVER METASTASIS (HCC): Primary | ICD-10-CM

## 2019-10-30 DIAGNOSIS — R35.0 URINARY FREQUENCY: ICD-10-CM

## 2019-10-30 DIAGNOSIS — C64.2 RENAL CELL CANCER, LEFT (HCC): ICD-10-CM

## 2019-10-30 DIAGNOSIS — Z98.890 STATUS POST ROBOT-ASSISTED SURGICAL PROCEDURE: ICD-10-CM

## 2019-10-30 PROCEDURE — 81003 URINALYSIS AUTO W/O SCOPE: CPT | Performed by: UROLOGY

## 2019-10-30 PROCEDURE — 99214 OFFICE O/P EST MOD 30 MIN: CPT | Performed by: UROLOGY

## 2019-10-30 RX ORDER — GLIPIZIDE 5 MG/1
5 TABLET, EXTENDED RELEASE ORAL DAILY
Refills: 3 | COMMUNITY
Start: 2019-10-23 | End: 2020-02-15

## 2019-10-30 RX ORDER — METFORMIN HYDROCHLORIDE 750 MG/1
TABLET, EXTENDED RELEASE ORAL
Refills: 1 | COMMUNITY
Start: 2019-09-24 | End: 2022-01-01

## 2019-10-30 NOTE — PROGRESS NOTES
Assessment/Plan:      Diagnoses and all orders for this visit:    Liver metastasis ([facility] [address] )  Comments:  Probable from kidney  Orders:  -     CT abdomen pelvis wo contrast; Future    Renal cell cancer, left (HCC)  -     POCT urine dip auto non-scope  -     CT abdomen pelvis wo contrast; Future    History of left radical nephrectomy  -     POCT urine dip auto non-scope  -     CT abdomen pelvis wo contrast; Future    Status post robot-assisted surgical procedure    Urinary frequency  -     POCT urine dip auto non-scope    Other orders  -     metFORMIN (GLUCOPHAGE-XR) 750 mg 24 hr tablet; TAKE 1 TABLET (750 MG TOTAL) BY MOUTH DAILY WITH BREAKFAST   -     GLIPIZIDE XL 5 MG 24 hr tablet; Take 5 mg by mouth daily  -     betamethasone valerate (VALISONE) 0 1 % ointment; APPLY TO THE AFFECTED AREA SMALL AMOUNT 1 TO 2 TIMES A WEEK FOR IRRITATION/ITCHING        Plan:  Sympathetically, for now the patient will mourn her daughter  I offered her the option of surveillance, or a medical oncology evaluation and which may result in the probable need for biopsy and possibly chemotherapy thereafter  Taking into consideration her advanced age and comorbidities, I agree that she should and would not pursue further evaluation or management of the liver lesion at this time  She will follow up and see me in 6 months with an interval CT scan    Subjective: For liver lesion     Patient ID: Raul Fatima is a 80 y o  female  HPI  She is now 6-1/2 years after her laparoscopic robotic left radical nephrectomy for stage pT1 RCC   Only urologic complaint is that of baseline frequency, which is not an issue for her   No hematuria, flank or abdominal pains to report   She states her appetite is good and her bowel function is normal   She denies any pains in new locations, or weight loss        She had recent cross-sectional imaging which found noted a small liver lesion which has increased in size, possibly consistent with a metastatic tumor   The liver lesion has increased in size from 9 mm to 1 8 mm in 6 months  Certainly concern about a metastatic lesion  Based on her overall health status, will monitor it for now will discuss have her see a medical oncologist for consultation if she so desires  The patient has noticed weight loss over the last year, but she has been dealing with motal illness and then recent death last week of her daughter             Review of Systems   Constitutional: Positive for appetite change, fatigue and unexpected weight change  Negative for chills and fever  HENT: Negative  Eyes: Negative  Respiratory: Negative  Cardiovascular: Negative  Gastrointestinal: Negative  Endocrine: Negative  Genitourinary: Negative  Musculoskeletal: Negative  Skin: Negative  Allergic/Immunologic: Negative  Neurological: Negative  Hematological: Negative  Psychiatric/Behavioral: Positive for dysphoric mood  Objective:     Physical Exam   Constitutional: She is oriented to person, place, and time  She appears well-developed and well-nourished  No distress  HENT:   Head: Normocephalic and atraumatic  Nose: Nose normal    Mouth/Throat: Oropharynx is clear and moist    Eyes: Pupils are equal, round, and reactive to light  Conjunctivae and EOM are normal  No scleral icterus  Neck: Normal range of motion  Neck supple  Cardiovascular: Normal rate, regular rhythm, normal heart sounds and intact distal pulses  No murmur heard  Pulmonary/Chest: Effort normal and breath sounds normal  No respiratory distress  She has no wheezes  She has no rales  Abdominal: Soft  Bowel sounds are normal  She exhibits no distension and no mass  There is no tenderness  Musculoskeletal: Normal range of motion  She exhibits no edema or tenderness  Lymphadenopathy:     She has no cervical adenopathy  Neurological: She is alert and oriented to person, place, and time  No cranial nerve deficit     Skin: Skin is warm and dry  No rash noted  No erythema  No pallor  Psychiatric: Her speech is normal and behavior is normal  Judgment and thought content normal  Cognition and memory are normal  She exhibits a depressed mood  Nursing note and vitals reviewed  CT CHEST ABDOMEN AND PELVIS  [facility]  Result Impression   Impression: No evidence of acute traumatic injury  Hypodense mass in the right  hepatic lobe  Metastatic disease cannot be excluded  Stable appearance of few  punctate nonspecific pulmonary nodules measuring up to 5 mm in size  Workstation:[id number]   Result Narrative   History: Status post fall  Contusion left parietal/occipital  Low blood  pressure      Exam: Nonenhanced CT of the chest, abdomen, and pelvis      Technique: Using helical technique, axial images were obtained through the  chest, abdomen, and pelvis   Coronal and sagittal reformations were performed      Comparison: Prior examination dated 5/30/2019      Chest CT  Lungs/Pleura: No pulmonary consolidation or contusion  There is no significant  change in appearance of few scattered punctate nodules in both lungs measuring  up to 5 mm in size  Mediastinum/Lymph nodes: Normal     Heart/Vessels: Coronary artery calcifications  Chest Wall: Normal      Abdomen CT  Liver: There is a hypodense lesion in the lateral aspect of the right hepatic  lobe measuring 1 6 x 1 8 cm  Gallbladder/Bile ducts: Cholelithiasis  Spleen: Not enlarged  Pancreas: No pancreatitis  Adrenal glands: Normal     Kidneys/Ureters: Status post left nephrectomy  Stable appearance of the right  kidney  Question stable small exophytic minimally hyperdense 5 mm lesion along  the lateral aspect of the right kidney, possible hyperdense cyst     Bowel/Mesentery: Sigmoid diverticulosis with no evidence of diverticulitis  Lymph nodes: No retroperitoneal lymphadenopathy      Vessels: Normal caliber aorta with atherosclerotic calcifications      Pelvis CT  No mass, lymphadenopathy or free fluid  Urinary Bladder:  Normal      Bones: Diffuse degenerative changes thoracolumbar spine       Other Result Information   Interface, Rad Results In - 08/04/2019  3:56 PM EDT  History: Status post fall  Contusion left parietal/occipital  Low blood  pressure      Exam: Nonenhanced CT of the chest, abdomen, and pelvis      Technique: Using helical technique, axial images were obtained through the  chest, abdomen, and pelvis  Coronal and sagittal reformations were performed      Comparison: Prior examination dated 5/30/2019      Chest CT  Lungs/Pleura: No pulmonary consolidation or contusion  There is no significant  change in appearance of few scattered punctate nodules in both lungs measuring  up to 5 mm in size  Mediastinum/Lymph nodes: Normal     Heart/Vessels: Coronary artery calcifications  Chest Wall: Normal      Abdomen CT  Liver: There is a hypodense lesion in the lateral aspect of the right hepatic  lobe measuring 1 6 x 1 8 cm  Gallbladder/Bile ducts: Cholelithiasis  Spleen: Not enlarged  Pancreas: No pancreatitis  Adrenal glands: Normal     Kidneys/Ureters: Status post left nephrectomy  Stable appearance of the right  kidney  Question stable small exophytic minimally hyperdense 5 mm lesion along  the lateral aspect of the right kidney, possible hyperdense cyst     Bowel/Mesentery: Sigmoid diverticulosis with no evidence of diverticulitis  Lymph nodes: No retroperitoneal lymphadenopathy  Vessels: Normal caliber aorta with atherosclerotic calcifications      Pelvis CT  No mass, lymphadenopathy or free fluid  Urinary Bladder:  Normal      Bones: Diffuse degenerative changes thoracolumbar spine      IMPRESSION:  Impression: No evidence of acute traumatic injury  Hypodense mass in the right  hepatic lobe  Metastatic disease cannot be excluded   Stable appearance of few  punctate nonspecific pulmonary nodules measuring up to 5 mm in size

## 2019-11-25 ENCOUNTER — EVALUATION (OUTPATIENT)
Dept: PHYSICAL THERAPY | Facility: CLINIC | Age: 84
End: 2019-11-25
Payer: MEDICARE

## 2019-11-25 ENCOUNTER — TRANSCRIBE ORDERS (OUTPATIENT)
Dept: PHYSICAL THERAPY | Facility: CLINIC | Age: 84
End: 2019-11-25

## 2019-11-25 DIAGNOSIS — R42 DISEQUILIBRIUM: Primary | ICD-10-CM

## 2019-11-25 PROCEDURE — 97530 THERAPEUTIC ACTIVITIES: CPT | Performed by: PHYSICAL THERAPIST

## 2019-11-25 PROCEDURE — 97162 PT EVAL MOD COMPLEX 30 MIN: CPT | Performed by: PHYSICAL THERAPIST

## 2019-11-25 NOTE — PROGRESS NOTES
PT Evaluation     Today's date: 2019  Patient name: Valentin Oneill  : 1/3/1932  MRN: [id number]  Referring provider: Cristobal Blankenship MD  Dx:   Encounter Diagnosis     ICD-10-CM    1  Disequilibrium R42                   Assessment  Assessment details: Pt is an 79 yo female with diagnosis of disequilibrium presenting with decreased BLE strength, impaired proprioception, limited standing/walking tolerance, and decreased functional balance  She has a history of frequent falls and would benefit from outpatient PT to address the above limitations and optimize safe functional independence  Functional limitations: decreased balance with change in surfaces/curbs, difficulty getting out of low chairs, limited standing/walking tolerance  Goals  STG - 3 weeks  1  Independent with HEP  2  Balance on foam for 10 seconds to indicate improved functional balance  LTG - 6 weeks  1  Increase score on [place] Balance Scale by at least 5 points to indicate improved functional balance  2  Meet all conditions of CTSIB to indicate improved functional balance  3  Tolerate standing/walking for 15 minutes prior to needing seated rest break to allow resuming PLOF  Plan  Plan details: Reviewed physical exam findings and plan of care  All questions answered to patient's satisfaction  Patient would benefit from: skilled physical therapy  Planned therapy interventions: manual therapy, neuromuscular re-education, patient education, therapeutic activities, therapeutic exercise and home exercise program  Frequency: 2x week  Duration in weeks: 6  Treatment plan discussed with: patient        Subjective Evaluation    History of Present Illness  Mechanism of injury: Pt reports she's been having frequent falls over the last 6 months with at least one a month  She denies vertigo, lightheadedness, or syncope  She reports the falls are mostly posterior and happen very suddenly   She arrives today with referral to outpatient PT     PMH significant for renal cancer with L kidney removed, DM II, HTN, hypothyroidism  Pain  No pain reported    Social Support  Steps to enter house: yes (1 + 2 YVES)  Stairs in house: yes (full flight with handrail to access laundry in basement)   Lives in: one-story house  Lives with: alone    Employment status: not working  Life stress: recent death of daughter from cancer      Diagnostic Tests  No diagnostic tests performed  Treatments  No previous or current treatments  Patient Goals  Patient goals for therapy: improved balance          Objective      Lower Extremity Strength    Left     Right  Hip Flex          4+/5    4+/5  Hip Ext  3+/5    3+/5   Hip Abd 4+/5    4+/5    Knee Flex 4/5    4/5  Knee Ext 4/5    4/5    Ankle DF 5/5    5/5  Ankle PF 4/5    4/5    *all MMT performed in sitting    Moderately restricted bilateral calves (AROM to neutral with knee extended)    Proprioception intact LLE, impaired at right toes       CTSIB  EO/firm: >30 sec, min sway  EC/firm: >30 sec, min sway  EO/foam: 3 sec, significant sway  EC/foam: unable sec, unable sway      Flowsheet Rows      Most Recent Value   [place] Balance Scale   1  Sitting to Standing  4   2  Standing Unsupported  4   3  Sitting with Back Unsupported but Feet Supported on Floor or on a Stool  4   4  Standing to Sitting  4   5  Transfers  3   6  Standing Unsupported with Eyes Closed  4   7  Standing Unsupported with Feet Together  4   8  Reach Forward with Outstretched Arm While Standing  2   9   Object from Floor from a Standing Position  3   10  Turning to Look Behind Over Left and Right Shoulders While Standing  2   11  Turn 360 Degrees  1   12  Place Alternate Foot on Step or Stool While Standing Unsupported  2   13  Standing Unsupported One Foot in [facility]  2   14   Standing on One Leg  1   [place] Balance Score  40            Precautions: fall risk, DM II    Daily Treatment Diary       Manuals 11/25 Exercise Diary              Recumb bike             Std SLR x3             HR/TR             Std calf stretch             Sidestepping, marching             Tandem, Retro amb             Heel walk, Toe walk             Alt Step Tap             Step Up F/L             3 way Step outs w/ resistance             Stance on foam                                                                                                                                               Bailee 40/56            Modalities

## 2019-11-25 NOTE — LETTER
2019    Renée Reyes MD  [address]    Patient: Ana M Dye   YOB: 1932   Date of Visit: 2019     Encounter Diagnosis     ICD-10-CM    1  Lurdes Kidd        Dear Dr Cody Faulkner: Thank you for your recent referral of Ana M Dye  Please review the attached evaluation summary from Katie's recent visit  Please verify that you agree with the plan of care by signing the attached order  If you have any questions or concerns, please do not hesitate to call  I sincerely appreciate the opportunity to share in the care of one of your patients and hope to have another opportunity to work with you in the near future  Sincerely,    Ambika Chauhan, PT      Referring Provider:      I certify that I have read the below Plan of Care and certify the need for these services furnished under this plan of treatment while under my care  Renée Reyes MD  [address] [place] [address] Street: [phone]          PT Evaluation     Today's date: 2019  Patient name: Ana M Dye  : 1/3/1932  MRN: [id number]  Referring provider: Memo Pierce MD  Dx:   Encounter Diagnosis     ICD-10-CM    1  Disequilibrium R42                   Assessment  Assessment details: Pt is an 81 yo female with diagnosis of disequilibrium presenting with decreased BLE strength, impaired proprioception, limited standing/walking tolerance, and decreased functional balance  She has a history of frequent falls and would benefit from outpatient PT to address the above limitations and optimize safe functional independence  Functional limitations: decreased balance with change in surfaces/curbs, difficulty getting out of low chairs, limited standing/walking tolerance  Goals  STG - 3 weeks  1   Independent with HEP  2  Balance on foam for 10 seconds to indicate improved functional balance  LTG - 6 weeks  1  Increase score on [place] Balance Scale by at least 5 points to indicate improved functional balance  2  Meet all conditions of CTSIB to indicate improved functional balance  3  Tolerate standing/walking for 15 minutes prior to needing seated rest break to allow resuming PLOF  Plan  Plan details: Reviewed physical exam findings and plan of care  All questions answered to patient's satisfaction  Patient would benefit from: skilled physical therapy  Planned therapy interventions: manual therapy, neuromuscular re-education, patient education, therapeutic activities, therapeutic exercise and home exercise program  Frequency: 2x week  Duration in weeks: 6  Treatment plan discussed with: patient        Subjective Evaluation    History of Present Illness  Mechanism of injury: Pt reports she's been having frequent falls over the last 6 months with at least one a month  She denies vertigo, lightheadedness, or syncope  She reports the falls are mostly posterior and happen very suddenly  She arrives today with referral to outpatient PT      PMH significant for renal cancer with L kidney removed, DM II, HTN, hypothyroidism  Pain  No pain reported    Social Support  Steps to enter house: yes (1 + 2 YVES)  Stairs in house: yes (full flight with handrail to access laundry in basement)   Lives in: one-story house  Lives with: alone    Employment status: not working  Life stress: recent death of daughter from cancer      Diagnostic Tests  No diagnostic tests performed  Treatments  No previous or current treatments  Patient Goals  Patient goals for therapy: improved balance          Objective      Lower Extremity Strength    Left     Right  Hip Flex          4+/5    4+/5  Hip Ext  3+/5    3+/5   Hip Abd 4+/5    4+/5    Knee Flex 4/5    4/5  Knee Ext 4/5    4/5    Ankle DF 5/5    5/5  Ankle PF 4/5    4/5    *all MMT performed in sitting    Moderately restricted bilateral calves (AROM to neutral with knee extended)    Proprioception intact LLE, impaired at right toes       CTSIB  EO/firm: >30 sec, min sway  EC/firm: >30 sec, min sway  EO/foam: 3 sec, significant sway  EC/foam: unable sec, unable sway      Flowsheet Rows      Most Recent Value   [place] Balance Scale   1  Sitting to Standing  4   2  Standing Unsupported  4   3  Sitting with Back Unsupported but Feet Supported on Floor or on a Stool  4   4  Standing to Sitting  4   5  Transfers  3   6  Standing Unsupported with Eyes Closed  4   7  Standing Unsupported with Feet Together  4   8  Reach Forward with Outstretched Arm While Standing  2   9   Object from Floor from a Standing Position  3   10  Turning to Look Behind Over Left and Right Shoulders While Standing  2   11  Turn 360 Degrees  1   12  Place Alternate Foot on Step or Stool While Standing Unsupported  2   13  Standing Unsupported One Foot in [facility]  2   14   Standing on One Leg  1   Mor[place]o Balance Score  40            Precautions: fall risk, DM II    Daily Treatment Diary       Manuals 11/25                                                                Exercise Diary              Recumb bike             Std SLR x3             HR/TR             Std calf stretch             Sidestepping, marching             Tandem, Retro amb             Heel walk, Toe walk             Alt Step Tap             Step Up F/L             3 way Step outs w/ resistance             Stance on foam                                                                                                                                               [place]o 40/56            Modalities

## 2019-11-26 ENCOUNTER — OFFICE VISIT (OUTPATIENT)
Dept: PHYSICAL THERAPY | Facility: CLINIC | Age: 84
End: 2019-11-26
Payer: MEDICARE

## 2019-11-26 DIAGNOSIS — R42 DISEQUILIBRIUM: Primary | ICD-10-CM

## 2019-11-26 PROCEDURE — 97112 NEUROMUSCULAR REEDUCATION: CPT | Performed by: PHYSICAL THERAPIST

## 2019-11-26 PROCEDURE — 97110 THERAPEUTIC EXERCISES: CPT | Performed by: PHYSICAL THERAPIST

## 2019-11-26 NOTE — PROGRESS NOTES
Daily Note     Today's date: 2019  Patient name: Raul Fatima  : 1/3/1932  MRN: [id number]  Referring provider: Marisol Ruby MD  Dx:   Encounter Diagnosis     ICD-10-CM    1  Disequilibrium R42                   Subjective: Pt reports she was fatigued after initial evaluation yesterday  Objective: See treatment diary below      Assessment: Tolerated treatment fair  Patient with difficulty following verbal and visual instructions  Mild confusion throughout session with new exercises  Plan: Continue per plan of care        Precautions: fall risk (Contact guard for standing balance), DM II    Daily Treatment Diary       Manuals                                                                Exercise Diary              Recumb bike  5'           Std SLR x3  10x ea           HR/TR  10x           Std calf stretch  30"x2           Sidestepping, marching  1 laps ea           Tandem, Retro amb  1 laps ea           Heel walk, Toe walk             Alt Step Tap  20x 4"           Step Up F/L  10x ea 4"           3 way Step outs w/ resistance             Stance on foam  30"x3                                                                                                                                             Mor[place]o 40/56            Modalities

## 2019-12-03 ENCOUNTER — OFFICE VISIT (OUTPATIENT)
Dept: PHYSICAL THERAPY | Facility: CLINIC | Age: 84
End: 2019-12-03
Payer: MEDICARE

## 2019-12-03 DIAGNOSIS — R42 DISEQUILIBRIUM: Primary | ICD-10-CM

## 2019-12-03 PROCEDURE — 97112 NEUROMUSCULAR REEDUCATION: CPT | Performed by: PHYSICAL THERAPIST

## 2019-12-03 PROCEDURE — 97110 THERAPEUTIC EXERCISES: CPT | Performed by: PHYSICAL THERAPIST

## 2019-12-03 NOTE — PROGRESS NOTES
Daily Note     Today's date: 12/3/2019  Patient name: Salome Boyce  : 1/3/1932  MRN: [id number]  Referring provider: Suzan Mcnair MD  Dx:   Encounter Diagnosis     ICD-10-CM    1  Disequilibrium R42                   Subjective: Pt reports she felt fine after last visit  She did report a near fall over the weekend though  Objective: See treatment diary below      Assessment: Tolerated treatment fair  Patient demonstrated fatigue post treatment      Plan: Continue per plan of care        Precautions: fall risk (Contact guard for standing balance), DM II    Daily Treatment Diary       Manuals 11/25 11/26 12/3                                                              Exercise Diary              Recumb bike  5' 6'          Std SLR x3  10x ea 15x ea          HR/TR  10x 15x ea          Std calf stretch  30"x2 30"x3          Sidestepping, marching  1 laps ea 2 laps ea          Tandem, Retro amb  1 laps ea 2 laps ea          Heel walk, Toe walk             Alt Step Tap  20x 4" 20x 4"          Step Up F/L  10x ea 4" 15x ea 4"          Fwd/bwd Step outs w/ resistance   5x ea          Stance on foam  30"x3 30"x3                                                                                                                                            Mor[place] 40/56            Modalities

## 2019-12-10 ENCOUNTER — APPOINTMENT (OUTPATIENT)
Dept: PHYSICAL THERAPY | Facility: CLINIC | Age: 84
End: 2019-12-10
Payer: MEDICARE

## 2019-12-12 ENCOUNTER — APPOINTMENT (OUTPATIENT)
Dept: PHYSICAL THERAPY | Facility: CLINIC | Age: 84
End: 2019-12-12
Payer: MEDICARE

## 2020-01-09 NOTE — PROGRESS NOTES
Pt has not been seen for treatment in > 30 days  She has canceled or no showed for multiple appts despite rescheduling  Discharge episode of care

## 2020-01-28 ENCOUNTER — TRANSCRIBE ORDERS (OUTPATIENT)
Dept: PHYSICAL THERAPY | Facility: CLINIC | Age: 85
End: 2020-01-28

## 2020-01-28 ENCOUNTER — EVALUATION (OUTPATIENT)
Dept: PHYSICAL THERAPY | Facility: CLINIC | Age: 85
End: 2020-01-28
Payer: MEDICARE

## 2020-01-28 DIAGNOSIS — R42 DISEQUILIBRIUM: Primary | ICD-10-CM

## 2020-01-28 DIAGNOSIS — R42 DIZZINESS AND GIDDINESS: Primary | ICD-10-CM

## 2020-01-28 PROCEDURE — 97112 NEUROMUSCULAR REEDUCATION: CPT | Performed by: PHYSICAL THERAPIST

## 2020-01-28 PROCEDURE — 97161 PT EVAL LOW COMPLEX 20 MIN: CPT | Performed by: PHYSICAL THERAPIST

## 2020-01-28 NOTE — LETTER
2020    Beverlyn Severin, MD  [address] [place] [address]    Patient: Robert Mendez   YOB: 1932   Date of Visit: 2020     Encounter Diagnosis     ICD-10-CM    1  Loudoun Gela        Dear Dr Armand Blackmon:    Thank you for your recent referral of Robert Mendez  Please review the attached evaluation summary from Katie's recent visit  Please verify that you agree with the plan of care by signing the attached order  If you have any questions or concerns, please do not hesitate to call  I sincerely appreciate the opportunity to share in the care of one of your patients and hope to have another opportunity to work with you in the near future  Sincerely,    Fortino Siu, PT      Referring Provider:      I certify that I have read the below Plan of Care and certify the need for these services furnished under this plan of treatment while under my care  Beverlyn Severin, MD  [address] [place] [address],[address] [facility]  VIA Facsimile: [phone]          PT Evaluation     Today's date: 2020  Patient name: Robert Mendez  : 1/3/1932  MRN: [id number]  Referring provider: Cole Layton MD  Dx:   Encounter Diagnosis     ICD-10-CM    1  Disequilibrium R42                   Assessment  Assessment details: Pt is an 81 yo female with diagnosis of disequilibrium complaining of frequent losses of balance and fear of falling  She demonstrates decreased functional balance and decreased BLE strength  She is a good candidate for outpatient physical therapy to address the above impairments and optimize safe functional mobility  Functional limitations: decreased balance with prolonged standing, fear on uneven surfaces, fear of walking in dim light, some difficulty with functional transfers  Goals  6 weeks  1   Increase score on [place] Balance Scale by at least 5-10 points to indicate improved functional balance  2  Meet all conditions of CTSIB to indicate improved functional balance  3  Tolerate standing/walking for 15 minutes prior to needing seated rest break to allow resuming PLOF  4  Independent with HEP at discharge to maintain gains  Plan  Plan details: Reviewed physical exam findings and plan of care  All questions answered to patient's satisfaction  Patient would benefit from: skilled physical therapy  Planned therapy interventions: neuromuscular re-education, patient education, therapeutic activities, therapeutic exercise and home exercise program  Frequency: 2x week  Duration in weeks: 6  Treatment plan discussed with: patient        Subjective Evaluation    History of Present Illness  Mechanism of injury: Pt reports over the last year her balance has declined with no apparent cause  She was seen for PT at this facility for the same diagnosis above, however, was only seen for 3 visits due to scheduling conflicts  She was discharged due to inactivity and returns today wanting to resume PT  She has not had any falls since starting PT and has been doing a few of her exercises at home  PMH significant for DM II, HTN  Pain  No pain reported  Progression: improved    Social Support  Steps to enter house: yes (3 YVES)  Stairs in house: yes (14 steps to laundry in basement)   Lives in: one-story house  Lives with: alone    Employment status: not working    Diagnostic Tests  No diagnostic tests performed  Treatments  Previous treatment: physical therapy  Patient Goals  Patient goals for therapy: improved balance and increased strength          Objective     Lower Extremity Strength    Left     Right  Hip Flex          4+/5    4+/5  Hip Ext  4-/5    4-/5   Hip Abd 4/5    4/5    Knee Flex 4+/5    4+/5  Knee Ext 4/5    4/5    Ankle DF 4/5    4/5  Ankle PF 4/5    4/5      Flowsheet Rows      Most Recent Value   Mor[place] Balance Scale   1  Sitting to Standing  4   2  Standing Unsupported  4   3  Sitting with Back Unsupported but Feet Supported on Floor or on a Stool  4   4  Standing to Sitting  4   5  Transfers  4   6  Standing Unsupported with Eyes Closed  4   7  Standing Unsupported with Feet Together  3   8  Reach Forward with Outstretched Arm While Standing  3   9   Object from Floor from a Standing Position  3   10  Turning to Look Behind Over Left and Right Shoulders While Standing  1   11  Turn 360 Degrees  1   12  Place Alternate Foot on Step or Stool While Standing Unsupported  0   13  Standing Unsupported One Foot in [facility]  2   14   Standing on One Leg  1   Mor[place]o Balance Score  38        CTSIB  EO/firm: >60 sec, min sway  EC/firm: >60 sec, sig sway  EO/foam: 20 sec, sig sway  EC/foam: 5 sec, sig sway               Precautions: DM II, HTN    Daily Treatment Diary       Manuals 1/28                                                                Exercise Diary              Recumb bike             Std SLR x3             HR/TR             Alt Step Tap             Step Up F/L             Marching, Sidestepping             Tandem, Retro amb             Calf stretch             Sit to stand, no UE             NBOS, EC, mod tandem on foam             Ball/Balloon                                                                                                                                               Morocho 38/56            Modalities

## 2020-02-03 ENCOUNTER — OFFICE VISIT (OUTPATIENT)
Dept: PHYSICAL THERAPY | Facility: CLINIC | Age: 85
End: 2020-02-03
Payer: MEDICARE

## 2020-02-03 DIAGNOSIS — R42 DISEQUILIBRIUM: Primary | ICD-10-CM

## 2020-02-03 PROCEDURE — 97112 NEUROMUSCULAR REEDUCATION: CPT

## 2020-02-03 NOTE — PROGRESS NOTES
Daily Note     Today's date: 2/3/2020  Patient name: Marie Delcid  : 1/3/1932  MRN: [id number]  Referring provider: Fredo Garrido MD  Dx:   Encounter Diagnosis     ICD-10-CM    1  Disequilibrium R42                   Subjective:  Pt reports she has not fallen, she does not feel light headed or dizzy  Notes she has been up/down basement steps several times this morning doing laundry  Objective: See treatment diary below      Assessment: Initiated exercise program and gait training with SPC as below  Required demonstration and vc'ing throughout same  Required brief rest periods  Able to kaila tx  Will monitor  Pt would benefit from cont PT  Plan: Continue per plan of care              Precautions: DM II, HTN    Daily Treatment Diary       Manuals 1/28 2/3                                                               Exercise Diary              Recumb bike  3'           Std SLR x3  x15ea           HR/TR  x15ea           Alt Step Tap  x15ea           Step Up F/L  NV           Marching, Sidestepping  2 laps  ea           Tandem, Retro amb  x1lap  ea           Calf stretch  20"x2  ea           Sit to stand, no UE  x10           NBOS, EC, mod tandem on foam             Ball/Balloon                                                                              Gait w/SPC  100'x1                                                               Morocho 38/56            Modalities

## 2020-02-05 ENCOUNTER — OFFICE VISIT (OUTPATIENT)
Dept: PHYSICAL THERAPY | Facility: CLINIC | Age: 85
End: 2020-02-05
Payer: MEDICARE

## 2020-02-05 DIAGNOSIS — R42 DISEQUILIBRIUM: Primary | ICD-10-CM

## 2020-02-05 PROCEDURE — 97112 NEUROMUSCULAR REEDUCATION: CPT

## 2020-02-05 PROCEDURE — 97110 THERAPEUTIC EXERCISES: CPT

## 2020-02-05 NOTE — PROGRESS NOTES
Daily Note     Today's date: 2020  Patient name: Jose Alfredo Mazariegos  : 1/3/1932  MRN: [id number]  Referring provider: Kalyan Torres MD  Dx:   Encounter Diagnosis     ICD-10-CM    1  Disequilibrium R42                   Subjective: Patient offers no new complaints at this time  Objective: See treatment diary below      Assessment: Pt requires frequent verbal and tactile cues to correct TE form as well as to stay on task       Plan: Continue per plan of care              Precautions: DM II, HTN    Daily Treatment Diary       Manuals 1/28 2/3 2/5                                                              Exercise Diary              Recumb bike  3' 4'          Std SLR x3  x15ea x20 ea          HR/TR  x15ea x20          Alt Step Tap  x15ea x20 ea          Step Up F/L  NV x10 ea          Marching, Sidestepping  2 laps  ea 2 laps ea          Tandem, Retro amb  x1lap  ea x1 lap          Calf stretch  20"x2  ea 20"x2 ea          Sit to stand, no UE  x10 2x10          NBOS, EC, mod tandem on foam             Ball/Balloon                                                                              Gait w/SPC  100'x1                                                               Morocho 38/56            Modalities

## 2020-02-10 ENCOUNTER — OFFICE VISIT (OUTPATIENT)
Dept: PHYSICAL THERAPY | Facility: CLINIC | Age: 85
End: 2020-02-10
Payer: MEDICARE

## 2020-02-10 DIAGNOSIS — R42 DISEQUILIBRIUM: Primary | ICD-10-CM

## 2020-02-10 PROCEDURE — 97110 THERAPEUTIC EXERCISES: CPT | Performed by: PHYSICAL THERAPIST

## 2020-02-10 PROCEDURE — 97112 NEUROMUSCULAR REEDUCATION: CPT | Performed by: PHYSICAL THERAPIST

## 2020-02-10 NOTE — PROGRESS NOTES
Daily Note     Today's date: 2/10/2020  Patient name: Sergey Wilson  : 1/3/1932  MRN: [id number]  Referring provider: Dyan Alvarez MD  Dx:   Encounter Diagnosis     ICD-10-CM    1  Disequilibrium R42                   Subjective: Pt reports she's feeling fair and has not had any recent falls  Objective: See treatment diary below      Assessment: Tolerated treatment well  Patient demonstrated fatigue post treatment      Plan: Continue per plan of care              Precautions: DM II, HTN    Daily Treatment Diary       Manuals 1/28 2/3 2/5 2/10                                                             Exercise Diary              Recumb bike  3' 4' 5'         Std SLR x3  x15ea x20 ea 20x ea         HR/TR  x15ea x20 20x         Alt Step Tap  x15ea x20 ea 6" x20 ea         Step Up F/L  NV x10 ea 6" 10x ea         Marching, Sidestepping  2 laps  ea 2 laps ea 2 laps ea         Tandem, Retro amb  x1lap  ea x1 lap 2 laps ea         Calf stretch  20"x2  ea 20"x2 ea 20"x3 ea         Sit to stand, no UE  x10 2x10 17x         NBOS, EC, mod tandem on foam             Ball/Balloon                                                                              Gait w/SPC  100'x1                                                               Mor[place]o 38/56            Modalities

## 2020-02-13 ENCOUNTER — APPOINTMENT (OUTPATIENT)
Dept: PHYSICAL THERAPY | Facility: CLINIC | Age: 85
End: 2020-02-13
Payer: MEDICARE

## 2020-02-15 ENCOUNTER — APPOINTMENT (EMERGENCY)
Dept: RADIOLOGY | Facility: HOSPITAL | Age: 85
DRG: 206 | End: 2020-02-15
Payer: MEDICARE

## 2020-02-15 ENCOUNTER — HOSPITAL ENCOUNTER (INPATIENT)
Facility: HOSPITAL | Age: 85
LOS: 3 days | Discharge: NURSING, REHAB OR LONG-TERM CARE | DRG: 206 | End: 2020-02-19
Attending: EMERGENCY MEDICINE | Admitting: INTERNAL MEDICINE
Payer: MEDICARE

## 2020-02-15 DIAGNOSIS — T14.8XXA ABRASION: ICD-10-CM

## 2020-02-15 DIAGNOSIS — R07.9 CHEST PAIN, UNSPECIFIED TYPE: ICD-10-CM
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
## 2020-02-15 DIAGNOSIS — R77.8 ELEVATED TROPONIN: ICD-10-CM
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
## 2020-02-15 LAB
ALBUMIN SERPL BCP-MCNC: 3.6 G/DL (ref 3.5–5)
ALP SERPL-CCNC: 152 U/L (ref 46–116)
ALT SERPL W P-5'-P-CCNC: 17 U/L (ref 12–78)
ANION GAP SERPL CALCULATED.3IONS-SCNC: 8 MMOL/L (ref 4–13)
AST SERPL W P-5'-P-CCNC: 28 U/L (ref 5–45)
BASOPHILS # BLD AUTO: 0.04 THOUSANDS/ΜL (ref 0–0.1)
BASOPHILS NFR BLD AUTO: 0 % (ref 0–1)
BILIRUB SERPL-MCNC: 0.37 MG/DL (ref 0.2–1)
BUN SERPL-MCNC: 34 MG/DL (ref 5–25)
CALCIUM SERPL-MCNC: 9.3 MG/DL (ref 8.3–10.1)
CHLORIDE SERPL-SCNC: 107 MMOL/L (ref 100–108)
CO2 SERPL-SCNC: 24 MMOL/L (ref 21–32)
CREAT SERPL-MCNC: 1.58 MG/DL (ref 0.6–1.3)
EOSINOPHIL # BLD AUTO: 0.1 THOUSAND/ΜL (ref 0–0.61)
EOSINOPHIL NFR BLD AUTO: 1 % (ref 0–6)
ERYTHROCYTE [DISTWIDTH] IN BLOOD BY AUTOMATED COUNT: 13.1 % (ref 11.6–15.1)
GFR SERPL CREATININE-BSD FRML MDRD: 29 ML/MIN/1.73SQ M
GLUCOSE SERPL-MCNC: 165 MG/DL (ref 65–140)
HCT VFR BLD AUTO: 38.7 % (ref 34.8–46.1)
HGB BLD-MCNC: 12.1 G/DL (ref 11.5–15.4)
IMM GRANULOCYTES # BLD AUTO: 0.03 THOUSAND/UL (ref 0–0.2)
IMM GRANULOCYTES NFR BLD AUTO: 0 % (ref 0–2)
LYMPHOCYTES # BLD AUTO: 0.93 THOUSANDS/ΜL (ref 0.6–4.47)
LYMPHOCYTES NFR BLD AUTO: 10 % (ref 14–44)
MCH RBC QN AUTO: 30.3 PG (ref 26.8–34.3)
MCHC RBC AUTO-ENTMCNC: 31.3 G/DL (ref 31.4–37.4)
MCV RBC AUTO: 97 FL (ref 82–98)
MONOCYTES # BLD AUTO: 0.7 THOUSAND/ΜL (ref 0.17–1.22)
MONOCYTES NFR BLD AUTO: 7 % (ref 4–12)
NEUTROPHILS # BLD AUTO: 7.64 THOUSANDS/ΜL (ref 1.85–7.62)
NEUTS SEG NFR BLD AUTO: 82 % (ref 43–75)
NRBC BLD AUTO-RTO: 0 /100 WBCS
PLATELET # BLD AUTO: 254 THOUSANDS/UL (ref 149–390)
PMV BLD AUTO: 10.3 FL (ref 8.9–12.7)
POTASSIUM SERPL-SCNC: 4.7 MMOL/L (ref 3.5–5.3)
PROT SERPL-MCNC: 7.7 G/DL (ref 6.4–8.2)
RBC # BLD AUTO: 4 MILLION/UL (ref 3.81–5.12)
SODIUM SERPL-SCNC: 139 MMOL/L (ref 136–145)
TROPONIN I SERPL-MCNC: 0.07 NG/ML
WBC # BLD AUTO: 9.44 THOUSAND/UL (ref 4.31–10.16)

## 2020-02-15 PROCEDURE — 80053 COMPREHEN METABOLIC PANEL: CPT | Performed by: EMERGENCY MEDICINE

## 2020-02-15 PROCEDURE — 71046 X-RAY EXAM CHEST 2 VIEWS: CPT

## 2020-02-15 PROCEDURE — 85025 COMPLETE CBC W/AUTO DIFF WBC: CPT | Performed by: EMERGENCY MEDICINE

## 2020-02-15 PROCEDURE — NC001 PR NO CHARGE: Performed by: EMERGENCY MEDICINE

## 2020-02-15 PROCEDURE — 93005 ELECTROCARDIOGRAM TRACING: CPT

## 2020-02-15 PROCEDURE — 72125 CT NECK SPINE W/O DYE: CPT

## 2020-02-15 PROCEDURE — 70450 CT HEAD/BRAIN W/O DYE: CPT

## 2020-02-15 PROCEDURE — 84484 ASSAY OF TROPONIN QUANT: CPT | Performed by: EMERGENCY MEDICINE

## 2020-02-15 PROCEDURE — 99285 EMERGENCY DEPT VISIT HI MDM: CPT

## 2020-02-15 PROCEDURE — 36415 COLL VENOUS BLD VENIPUNCTURE: CPT | Performed by: EMERGENCY MEDICINE

## 2020-02-15 PROCEDURE — 99220 PR INITIAL OBSERVATION CARE/DAY 70 MINUTES: CPT | Performed by: INTERNAL MEDICINE

## 2020-02-15 PROCEDURE — 99285 EMERGENCY DEPT VISIT HI MDM: CPT | Performed by: EMERGENCY MEDICINE

## 2020-02-15 RX ORDER — BACITRACIN, NEOMYCIN, POLYMYXIN B 400; 3.5; 5 [USP'U]/G; MG/G; [USP'U]/G
1 OINTMENT TOPICAL ONCE
Status: COMPLETED | OUTPATIENT
Start: 2020-02-15 | End: 2020-02-15

## 2020-02-15 RX ORDER — ASPIRIN 325 MG
325 TABLET ORAL ONCE
Status: COMPLETED | OUTPATIENT
Start: 2020-02-15 | End: 2020-02-15

## 2020-02-15 RX ORDER — PRAVASTATIN SODIUM 80 MG/1
80 TABLET ORAL
Status: DISCONTINUED | OUTPATIENT
Start: 2020-02-16 | End: 2020-02-19 | Stop reason: HOSPADM

## 2020-02-15 RX ORDER — PANTOPRAZOLE SODIUM 40 MG/1
40 TABLET, DELAYED RELEASE ORAL
Status: DISCONTINUED | OUTPATIENT
Start: 2020-02-16 | End: 2020-02-19 | Stop reason: HOSPADM

## 2020-02-15 RX ORDER — ACETAMINOPHEN 325 MG/1
650 TABLET ORAL EVERY 6 HOURS PRN
Status: DISCONTINUED | OUTPATIENT
Start: 2020-02-15 | End: 2020-02-19 | Stop reason: HOSPADM

## 2020-02-15 RX ORDER — METOPROLOL SUCCINATE 25 MG/1
25 TABLET, EXTENDED RELEASE ORAL DAILY
Status: DISCONTINUED | OUTPATIENT
Start: 2020-02-16 | End: 2020-02-19 | Stop reason: HOSPADM

## 2020-02-15 RX ORDER — HEPARIN SODIUM 5000 [USP'U]/ML
5000 INJECTION, SOLUTION INTRAVENOUS; SUBCUTANEOUS EVERY 8 HOURS SCHEDULED
Status: DISCONTINUED | OUTPATIENT
Start: 2020-02-15 | End: 2020-02-19 | Stop reason: HOSPADM

## 2020-02-15 RX ORDER — MAGNESIUM CHLORIDE 64 MG
64 TABLET, DELAYED RELEASE (ENTERIC COATED) ORAL DAILY
Status: DISCONTINUED | OUTPATIENT
Start: 2020-02-16 | End: 2020-02-19 | Stop reason: HOSPADM

## 2020-02-15 RX ORDER — DOCUSATE SODIUM 100 MG/1
100 CAPSULE, LIQUID FILLED ORAL 2 TIMES DAILY
Status: DISCONTINUED | OUTPATIENT
Start: 2020-02-15 | End: 2020-02-19 | Stop reason: HOSPADM

## 2020-02-15 RX ORDER — ASPIRIN 81 MG/1
81 TABLET, CHEWABLE ORAL DAILY
Status: DISCONTINUED | OUTPATIENT
Start: 2020-02-16 | End: 2020-02-19 | Stop reason: HOSPADM

## 2020-02-15 RX ORDER — LEVOTHYROXINE SODIUM 0.07 MG/1
75 TABLET ORAL
Status: DISCONTINUED | OUTPATIENT
Start: 2020-02-16 | End: 2020-02-19 | Stop reason: HOSPADM

## 2020-02-15 RX ORDER — BACITRACIN, NEOMYCIN, POLYMYXIN B 400; 3.5; 5 [USP'U]/G; MG/G; [USP'U]/G
3 OINTMENT TOPICAL ONCE
Status: DISCONTINUED | OUTPATIENT
Start: 2020-02-15 | End: 2020-02-15

## 2020-02-15 RX ADMIN — HEPARIN SODIUM 5000 UNITS: 5000 INJECTION INTRAVENOUS; SUBCUTANEOUS at 22:59

## 2020-02-15 RX ADMIN — BACITRACIN, NEOMYCIN, POLYMYXIN B 1 SMALL APPLICATION: 400; 3.5; 5 OINTMENT TOPICAL at 23:14

## 2020-02-15 RX ADMIN — ACETAMINOPHEN 650 MG: 325 TABLET ORAL at 22:58

## 2020-02-15 RX ADMIN — ASPIRIN 325 MG ORAL TABLET 325 MG: 325 PILL ORAL at 22:59

## 2020-02-16 LAB
ANION GAP SERPL CALCULATED.3IONS-SCNC: 8 MMOL/L (ref 4–13)
ATRIAL RATE: 54 BPM
ATRIAL RATE: 55 BPM
ATRIAL RATE: 55 BPM
ATRIAL RATE: 57 BPM
BUN SERPL-MCNC: 33 MG/DL (ref 5–25)
CALCIUM SERPL-MCNC: 9.2 MG/DL (ref 8.3–10.1)
CHLORIDE SERPL-SCNC: 109 MMOL/L (ref 100–108)
CHOLEST SERPL-MCNC: 242 MG/DL (ref 50–200)
CO2 SERPL-SCNC: 23 MMOL/L (ref 21–32)
CREAT SERPL-MCNC: 1.43 MG/DL (ref 0.6–1.3)
ERYTHROCYTE [DISTWIDTH] IN BLOOD BY AUTOMATED COUNT: 12.9 % (ref 11.6–15.1)
EST. AVERAGE GLUCOSE BLD GHB EST-MCNC: 151 MG/DL
GFR SERPL CREATININE-BSD FRML MDRD: 33 ML/MIN/1.73SQ M
GLUCOSE SERPL-MCNC: 101 MG/DL (ref 65–140)
GLUCOSE SERPL-MCNC: 162 MG/DL (ref 65–140)
GLUCOSE SERPL-MCNC: 169 MG/DL (ref 65–140)
GLUCOSE SERPL-MCNC: 190 MG/DL (ref 65–140)
GLUCOSE SERPL-MCNC: 99 MG/DL (ref 65–140)
HBA1C MFR BLD: 6.9 %
HCT VFR BLD AUTO: 34.5 % (ref 34.8–46.1)
HDLC SERPL-MCNC: 70 MG/DL
HGB BLD-MCNC: 10.8 G/DL (ref 11.5–15.4)
LDLC SERPL CALC-MCNC: 138 MG/DL (ref 0–100)
MCH RBC QN AUTO: 29.9 PG (ref 26.8–34.3)
MCHC RBC AUTO-ENTMCNC: 31.3 G/DL (ref 31.4–37.4)
MCV RBC AUTO: 96 FL (ref 82–98)
NONHDLC SERPL-MCNC: 172 MG/DL
P AXIS: 39 DEGREES
P AXIS: 45 DEGREES
P AXIS: 61 DEGREES
P AXIS: 67 DEGREES
PLATELET # BLD AUTO: 216 THOUSANDS/UL (ref 149–390)
PLATELET # BLD AUTO: 237 THOUSANDS/UL (ref 149–390)
PMV BLD AUTO: 10.2 FL (ref 8.9–12.7)
PMV BLD AUTO: 10.3 FL (ref 8.9–12.7)
POTASSIUM SERPL-SCNC: 4.6 MMOL/L (ref 3.5–5.3)
PR INTERVAL: 192 MS
PR INTERVAL: 200 MS
QRS AXIS: -10 DEGREES
QRS AXIS: -11 DEGREES
QRS AXIS: -21 DEGREES
QRS AXIS: -24 DEGREES
QRSD INTERVAL: 120 MS
QRSD INTERVAL: 122 MS
QRSD INTERVAL: 122 MS
QRSD INTERVAL: 126 MS
QT INTERVAL: 502 MS
QT INTERVAL: 504 MS
QT INTERVAL: 506 MS
QT INTERVAL: 506 MS
QTC INTERVAL: 479 MS
QTC INTERVAL: 480 MS
QTC INTERVAL: 482 MS
QTC INTERVAL: 492 MS
RBC # BLD AUTO: 3.61 MILLION/UL (ref 3.81–5.12)
SODIUM SERPL-SCNC: 140 MMOL/L (ref 136–145)
T WAVE AXIS: 130 DEGREES
T WAVE AXIS: 130 DEGREES
T WAVE AXIS: 135 DEGREES
T WAVE AXIS: 139 DEGREES
TRIGL SERPL-MCNC: 169 MG/DL
TROPONIN I SERPL-MCNC: 0.26 NG/ML
TROPONIN I SERPL-MCNC: 0.27 NG/ML
TROPONIN I SERPL-MCNC: 0.28 NG/ML
VENTRICULAR RATE: 54 BPM
VENTRICULAR RATE: 55 BPM
VENTRICULAR RATE: 55 BPM
VENTRICULAR RATE: 57 BPM
WBC # BLD AUTO: 7.9 THOUSAND/UL (ref 4.31–10.16)

## 2020-02-16 PROCEDURE — 80061 LIPID PANEL: CPT | Performed by: INTERNAL MEDICINE

## 2020-02-16 PROCEDURE — 84484 ASSAY OF TROPONIN QUANT: CPT | Performed by: INTERNAL MEDICINE

## 2020-02-16 PROCEDURE — 99232 SBSQ HOSP IP/OBS MODERATE 35: CPT | Performed by: NURSE PRACTITIONER

## 2020-02-16 PROCEDURE — 93010 ELECTROCARDIOGRAM REPORT: CPT | Performed by: INTERNAL MEDICINE

## 2020-02-16 PROCEDURE — 83036 HEMOGLOBIN GLYCOSYLATED A1C: CPT | Performed by: INTERNAL MEDICINE

## 2020-02-16 PROCEDURE — 93005 ELECTROCARDIOGRAM TRACING: CPT

## 2020-02-16 PROCEDURE — 99223 1ST HOSP IP/OBS HIGH 75: CPT | Performed by: INTERNAL MEDICINE

## 2020-02-16 PROCEDURE — 85027 COMPLETE CBC AUTOMATED: CPT | Performed by: INTERNAL MEDICINE

## 2020-02-16 PROCEDURE — 82948 REAGENT STRIP/BLOOD GLUCOSE: CPT

## 2020-02-16 PROCEDURE — 97163 PT EVAL HIGH COMPLEX 45 MIN: CPT

## 2020-02-16 PROCEDURE — 85049 AUTOMATED PLATELET COUNT: CPT | Performed by: INTERNAL MEDICINE

## 2020-02-16 PROCEDURE — 80048 BASIC METABOLIC PNL TOTAL CA: CPT | Performed by: INTERNAL MEDICINE

## 2020-02-16 RX ADMIN — HEPARIN SODIUM 5000 UNITS: 5000 INJECTION INTRAVENOUS; SUBCUTANEOUS at 06:27

## 2020-02-16 RX ADMIN — ACETAMINOPHEN 650 MG: 325 TABLET ORAL at 06:25

## 2020-02-16 RX ADMIN — LEVOTHYROXINE SODIUM 75 MCG: 75 TABLET ORAL at 06:25

## 2020-02-16 RX ADMIN — HEPARIN SODIUM 5000 UNITS: 5000 INJECTION INTRAVENOUS; SUBCUTANEOUS at 14:33

## 2020-02-16 RX ADMIN — ASPIRIN 81 MG 81 MG: 81 TABLET ORAL at 09:47

## 2020-02-16 RX ADMIN — CYANOCOBALAMIN TAB 500 MCG 1000 MCG: 500 TAB at 09:45

## 2020-02-16 RX ADMIN — INSULIN LISPRO 1 UNITS: 100 INJECTION, SOLUTION INTRAVENOUS; SUBCUTANEOUS at 11:56

## 2020-02-16 RX ADMIN — PANTOPRAZOLE SODIUM 40 MG: 40 TABLET, DELAYED RELEASE ORAL at 06:25

## 2020-02-16 RX ADMIN — HEPARIN SODIUM 5000 UNITS: 5000 INJECTION INTRAVENOUS; SUBCUTANEOUS at 21:09

## 2020-02-16 RX ADMIN — INSULIN LISPRO 1 UNITS: 100 INJECTION, SOLUTION INTRAVENOUS; SUBCUTANEOUS at 16:35

## 2020-02-16 RX ADMIN — DOCUSATE SODIUM 100 MG: 100 CAPSULE, LIQUID FILLED ORAL at 09:46

## 2020-02-16 RX ADMIN — METOPROLOL SUCCINATE 25 MG: 25 TABLET, EXTENDED RELEASE ORAL at 09:45

## 2020-02-16 RX ADMIN — DOCUSATE SODIUM 100 MG: 100 CAPSULE, LIQUID FILLED ORAL at 17:45

## 2020-02-16 RX ADMIN — PRAVASTATIN SODIUM 80 MG: 80 TABLET ORAL at 17:45

## 2020-02-16 RX ADMIN — MAGNESIUM 64 MG (MAGNESIUM CHLORIDE) TABLET,DELAYED RELEASE 64 MG: at 09:48

## 2020-02-16 RX ADMIN — INSULIN LISPRO 1 UNITS: 100 INJECTION, SOLUTION INTRAVENOUS; SUBCUTANEOUS at 21:11

## 2020-02-16 NOTE — ASSESSMENT & PLAN NOTE
· Patient with mechanical fall at home as above, with multiple abrasions however trauma imaging negative for acute injury  · Apparently has had several near episodes in past and follows with outpatient PT/OT  · Will request inpatient PT/OT evaluation while here

## 2020-02-16 NOTE — ASSESSMENT & PLAN NOTE
· Troponin 0 07 on presentation, may be in setting of fall however had complained of chest pain and additionally with cardiac history as below    0 26, 0 28 followed by 0 27   · Cardiology consulted- non MI trop elevation

## 2020-02-16 NOTE — PLAN OF CARE
Problem: PHYSICAL THERAPY ADULT  Goal: Performs mobility at highest level of function for planned discharge setting  See evaluation for individualized goals  Description  Treatment/Interventions: Functional transfer training, LE strengthening/ROM, Therapeutic exercise, Elevations, Endurance training, Patient/family training, Gait training, Bed mobility  Equipment Recommended: Walker       See flowsheet documentation for full assessment, interventions and recommendations  Note:   Prognosis: Good  Problem List: Decreased strength, Decreased endurance, Impaired balance, Decreased mobility, Decreased safety awareness, Pain, Decreased skin integrity  Assessment: Pt is 80 y o  female seen for high complexity PT evaluation s/p admission to [facility] on 2/14/20 after a fall at time, pt fell down 3 steps, denies LOC, after fall c/o 5 minutes of central chest pain  Comorbidities affecting pt's physical performance at time of assessment include elevated troponin, HTN, hyperlipidemia, LBBB at baseline, non-insulin dependent DM, hypothyroidism, CKD, and history of ischemic cardiomyopathy  Pt resides alone in a ranch style home with 3 YVES  Pt independent with ADLs, IADLs, and functional mobility  Uses a RW for longer community distance  Upon evaluation, pt required min-mod A for transfers, min A x 1 for bed mobility, supervision for ambulation, and min A x 1 for stair management  Pt ambulated 50 feet x 2 with RW at close supervision, no LOB  Pt negotiated 6 steps with L handrail and step to pattern and min A x 1 for stability  Post-activity, pt had a episode of dizziness for approx 5 seconds and LOB, required mod A x 1 to control pt's descent into chair to recover LOB  Pt reported dizziness subsided, BP: 157/68, RN notified   At this time, there are concerns about fall risk as patient reported multiple falls in the past and in addition pt tested 38/56 on [place] Balance Scale on 1/28/20 (@ [facility]) which indicates a greater risk of falling (<45/56 cut off score)  Pt lives at home alone, receives no daily check ups and reports she doesn't have close family to assist her  Patient's decreased mobility level and increased fall risk is secondary to deficits in dynamic/ambulatory balance, gait, generalized weakness/deconditioning, onset of dizziness after activity,   Current clinical presentation is unstable/unpredictable seen in pt's presentation of ongoing medical management, increased reliance on assistance compared to PLOF, onset of dizziness with activity, and significant PMH  Pt to benefit from continued PT tx to address deficits as defined above and maximize level of functional independent mobility and consistency  From PT/mobility standpoint, recommendation at time of d/c would be STR pending progress in order to facilitate return to PLOF  Pt may progress to home with [facility]OPT but recommend increased family support; pending increased independence  Barriers to Discharge: Decreased caregiver support, Inaccessible home environment  Barriers to Discharge Comments: Pt resides ALONE;  Recommendation: Post acute IP rehab(may progress to homePT )     PT - OK to Discharge: Yes    See flowsheet documentation for full assessment

## 2020-02-16 NOTE — ASSESSMENT & PLAN NOTE
· Patient with mechanical fall at home as above, with multiple abrasions however trauma imaging negative for acute injury  · Multiple skin tears BL forearms, left elbow and left knee    Dressings in place   · Apparently has had several near episodes in past and follows with outpatient PT/OT  · PT and OT ordered  · PT recommending STR  · OT pending  · Patient agreeable

## 2020-02-16 NOTE — ASSESSMENT & PLAN NOTE
· Baseline creat 1 3-1 6   1 58 on admission    1 43 today   · Monitor with BMP  · Avoid nephrotoxins as able

## 2020-02-16 NOTE — DISCHARGE INSTRUCTIONS
Wound care cleanse with saline, cover with Dermagran and cover with kerlex      Further wound care orders TBD by MD at facility

## 2020-02-16 NOTE — ED NOTES
CT not avb as a trauma is in ED CT and an ICU pt is in main        Pauline Salas, ERIKA  02/15/20 1919

## 2020-02-16 NOTE — ASSESSMENT & PLAN NOTE
· Troponin 0 07 on presentation, may be in setting of fall however had complained of chest pain and additionally with cardiac history as below  · Plan as above

## 2020-02-16 NOTE — ASSESSMENT & PLAN NOTE
· S/p stenting x1 to RCA approximately 2016  · Plan for new chest pain as above  · Continue ASA, beta blocker, statin

## 2020-02-16 NOTE — H&P
H&P- Valentin Oneill 1/3/1932, 80 y o  female MRN: [id number]    Unit/Bed#: [facility] 701-01 Encounter: [id number]    Primary Care Provider: Martine Padilla MD   Date and time admitted to hospital: 2/15/2020  7:12 PM        * Chest pain  Assessment & Plan  · Had complained about approximately 5 minute episode of chest pain following fall  · EKG essentially unchanged from prior however troponin 0 07 on initial check in with known history of CAD  · Possibly type 2 in setting of fall, however will admit as observation, trend 2 further troponin, serial EKGs, telemetry monitoring  · Will request Cardiology inputs  · Give ASA 325mg x1    Elevated troponin  Assessment & Plan  · Troponin 0 07 on presentation, may be in setting of fall however had complained of chest pain and additionally with cardiac history as below  · Plan as above    Fall at home, initial encounter  Assessment & Plan  · Patient with mechanical fall at home as above, with multiple abrasions however trauma imaging negative for acute injury  · Apparently has had several near episodes in past and follows with outpatient PT/OT  · Will request inpatient PT/OT evaluation while here    Type 2 diabetes mellitus, without long-term current use of insulin (HCC)  Assessment & Plan  Lab Results   Component Value Date    HGBA1C 7 2 (H) 09/15/2019       No results for input(s): POCGLU in the last 72 hours      Blood Sugar Average: Last 72 hrs:  ·  hold metformin while inpatient  · Initiate SSI with Accu-Cheks, carb controlled diet  · Initiate hypoglycemia protocol    CAD (coronary artery disease)  Assessment & Plan  · S/p stenting x1 to RCA approximately 2016  · Plan for new chest pain as above  · Continue ASA, beta blocker, statin    Hypertension  Assessment & Plan  · Blood pressure above goal on admission, possibly in setting of fall  · Pain management for fall as appropriate; if this is not improve blood pressure will initiate p r n  IV antihypertensive  · Otherwise continue home antihypertensives with hold parameters and monitor blood pressure per protocol    Hyperlipidemia  Assessment & Plan  · Continue statin therapy    CKD (chronic kidney disease)  Assessment & Plan  · Monitor with BMP  · Avoid nephrotoxins as able      VTE Prophylaxis: Heparin  / sequential compression device   Code Status: Level 3 - DNAR and DNI  POLST: POLST form is not discussed and not completed at this time  Anticipated Length of Stay:  Patient will be admitted on an Observation basis with an anticipated length of stay of  Less than 2 midnights  Justification for Hospital Stay: Please see detailed plans noted above  Chief Complaint:     Fall at home, chest pain  History of Present Illness:  Matilde Alberto is a 80 y o  female who has a past medical history significant for CAD s/p stenting in 2016 to RCA, CKD, hypertension, hyperlipidemia, type 2 diabetes  She initially presented as a trauma alert after a fall at home or patient fell backwards down 3 for stairs while carrying laundry from the basement  She denied any dizziness/lightheadedness or chest pain with the fall, and denied any loss of consciousness  Had complained of bilateral arm pain and left knee pain on arrival to ED  Additionally, immediately after fall had complained of approximately 5 minutes of central chest pain without radiation not associated with shortness breath, nausea, or vomiting for which patient did not take medications to relieve pain  She reports complete resolution of chest pain after this episode, and denies chest pain at this time  Found during ED evaluation with negative trauma imaging, however with troponin 0 07 and patient admitted for further evaluation of chest pain with elevated troponin  At the time of my evaluation, patient is noting some residual left arm pain and left knee pain, but denies chest pain at this time    Admits to some prior episodes for which she is currently following with outpatient physical therapy  Otherwise offers no other acute complaints at this time  Review of Systems:    Constitutional:  Denies fever or chills   Eyes:  Denies change in visual acuity   HENT:  Denies nasal congestion or sore throat   Respiratory:  Denies cough or shortness of breath   Cardiovascular:  Denies edema but endorsed chest pain  GI:  Denies abdominal pain, nausea, vomiting, bloody stools or diarrhea   :  Denies dysuria   Musculoskeletal:  Denies back pain but endorses arm and knee pain  Integument:  Denies rash   Neurologic:  Denies headache, focal weakness or sensory changes   Endocrine:  Denies polyuria or polydipsia   Lymphatic:  Denies swollen glands   Psychiatric:  Denies depression or anxiety     Past Medical and Surgical History:   Past Medical History:   Diagnosis Date    Acquired absence of kidney     Frequency of micturition     GERD (gastroesophageal reflux disease)     Hypertension     Malignant neoplasm of left kidney, except renal pelvis (HCC)     Nocturia     Personal history of other malignant neoplasm of kidney     Renal mass, left     UTI (urinary tract infection)      Past Surgical History:   Procedure Laterality Date    RADICAL NEPHRECTOMY Left 2013       Meds/Allergies:  Medications Prior to Admission   Medication    aspirin 81 MG tablet    betamethasone valerate (VALISONE) 0 1 % ointment    Cyanocobalamin (B-12) 1000 MCG CAPS    docusate sodium (COLACE) 100 mg capsule    furosemide (LASIX) 20 mg tablet    levothyroxine 75 mcg tablet    magnesium chloride (MAG64) 64 MG TBEC EC tablet    metFORMIN (GLUCOPHAGE-XR) 750 mg 24 hr tablet    metoprolol succinate (TOPROL-XL) 25 mg 24 hr tablet    mupirocin (BACTROBAN) 2 % ointment    omeprazole (PRILOSEC) 20 mg delayed release capsule    simvastatin (ZOCOR) 40 mg tablet       Allergies:    Allergies   Allergen Reactions    Ace Inhibitors      Other reaction(s): Hypotension    Cephalexin     Clindamycin     Doxycycline     Erythromycin Base     Lactate     Levaquin [Levofloxacin]     Penicillins     Procainamide     Quinidine (Non-Therapeutic)     Sulfa Antibiotics      History:  Marital Status:      Substance Use History:   Social History     Substance and Sexual Activity   Alcohol Use Not Currently     Social History     Tobacco Use   Smoking Status Never Smoker   Smokeless Tobacco Never Used     Social History     Substance and Sexual Activity   Drug Use Not Currently       Family History:  Family History   Problem Relation Age of Onset    Diabetes Father     Heart failure Brother        Physical Exam:     Vitals:   Blood Pressure: 152/98 (02/15/20 2000)  Pulse: 94 (02/15/20 2000)  Temperature: 97 5 °F (36 4 °C) (02/15/20 1915)  Temp Source: Oral (02/15/20 1915)  Respirations: 16 (02/15/20 2000)  Weight - Scale: 66 7 kg (147 lb) (02/15/20 1915)  SpO2: 100 % (02/15/20 2000)    Constitutional:  Well developed, well nourished, no acute distress, non-toxic appearance   Eyes:  PERRL, conjunctiva normal   HENT:  Atraumatic, external ears normal, nose normal, oropharynx moist, no pharyngeal exudates  Neck- normal range of motion, no tenderness, supple   Respiratory:  No respiratory distress, normal breath sounds, no rales, no wheezing   Cardiovascular:  Normal rate, normal rhythm, no murmurs, no gallops, no rubs   GI:  Soft, nondistended, normal bowel sounds, nontender, no organomegaly, no mass, no rebound, no guarding   :  No costovertebral angle tenderness   Musculoskeletal:  No edema, slight pain to palpation over left anterior knee and bilateral forearms over bandages, no deformities   Back- no tenderness  Integument:  Well hydrated, bilateral forearms and left knee covered by gauze bandages, some bruising noted at left hand, small abrasion on left forehead  Lymphatic:  No lymphadenopathy noted   Neurologic:  Alert &awake, communicative, CN 2-12 normal, normal motor function, normal sensory function, no focal deficits noted   Psychiatric:  Speech and behavior appropriate       Lab Results: I have personally reviewed pertinent reports  Results from last 7 days   Lab Units 02/15/20  1952   WBC Thousand/uL 9 44   HEMOGLOBIN g/dL 12 1   HEMATOCRIT % 38 7   PLATELETS Thousands/uL 254   NEUTROS PCT % 82*   LYMPHS PCT % 10*   MONOS PCT % 7   EOS PCT % 1     Results from last 7 days   Lab Units 02/15/20  1952   POTASSIUM mmol/L 4 7   CHLORIDE mmol/L 107   CO2 mmol/L 24   BUN mg/dL 34*   CREATININE mg/dL 1 58*   CALCIUM mg/dL 9 3   ALK PHOS U/L 152*   ALT U/L 17   AST U/L 28           EKG:  Appears sinus rhythm with sinus arrhythmia    Imaging: I have personally reviewed pertinent reports  Ct Head Without Contrast    Result Date: 2/15/2020  Narrative: CT BRAIN - WITHOUT CONTRAST INDICATION:   frontal trauma on asa  COMPARISON:  7/2/2009 CT TECHNIQUE:  CT examination of the brain was performed  In addition to axial images, coronal 2D reformatted images were created and submitted for interpretation  Radiation dose length product (DLP) for this visit:  857 42 mGy-cm   This examination, like all CT scans performed in the [facility], was performed utilizing techniques to minimize radiation dose exposure, including the use of iterative  reconstruction and automated exposure control  IMAGE QUALITY:  Diagnostic  FINDINGS: PARENCHYMA:  No intracranial mass, mass effect or midline shift  No CT signs of acute infarction  No acute parenchymal hemorrhage  Interval development of chronic appearing left frontoparietal subcortical ischemia and small chronic bilateral basal ganglia lacunar infarctions  VENTRICLES AND EXTRA-AXIAL SPACES:  Normal for the patient's age  VISUALIZED ORBITS AND PARANASAL SINUSES:  Unremarkable   CALVARIUM AND EXTRACRANIAL SOFT TISSUES:  Normal      Impression: 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 and small bilateral basal ganglia lacunar infarctions No acute intracranial abnormality  Findings personally discussed by phone with Dr Danielle Davis at 7:50 PM, 2/15/2020 Workstation performed: [id number]     Ct Cervical Spine Without Contrast    Result Date: 2/15/2020  Narrative: CT CERVICAL SPINE - WITHOUT CONTRAST INDICATION:   frontal head trauma  COMPARISON:  None  TECHNIQUE:  CT examination of the cervical spine was performed without intravenous contrast   Contiguous axial images were obtained  Sagittal and coronal reconstructions were performed  Radiation dose length product (DLP) for this visit:  248 61 mGy-cm   This examination, like all CT scans performed in the [facility], was performed utilizing techniques to minimize radiation dose exposure, including the use of iterative  reconstruction and automated exposure control  IMAGE QUALITY:  Diagnostic  FINDINGS: ALIGNMENT:  Normal alignment of the cervical spine  No subluxation  VERTEBRAL BODIES:  No fracture  DEGENERATIVE CHANGES: Advanced multilevel degenerative spondylosis  Bridging osteophytes throughout the cervical region consistent with developing DISH  PREVERTEBRAL AND PARASPINAL SOFT TISSUES:  Unremarkable  THORACIC INLET:  Normal      Impression: Multilevel degenerative spondylosis  Developing DISH No acute cervical spine fracture or traumatic malalignment  Findings personally discussed by phone with Dr Danielle Davis at 7:50 PM, 2/15/2020  Workstation performed: [id number]         ** Please Note: Dragon 360 Dictation voice to text software was used in the creation of this document   **

## 2020-02-16 NOTE — PLAN OF CARE
Problem: PAIN - ADULT  Goal: Verbalizes/displays adequate comfort level or baseline comfort level  Description  Interventions:  - Encourage patient to monitor pain and request assistance  - Assess pain using appropriate pain scale  - Administer analgesics based on type and severity of pain and evaluate response  - Implement non-pharmacological measures as appropriate and evaluate response  - Consider cultural and social influences on pain and pain management  - Notify physician/advanced practitioner if interventions unsuccessful or patient reports new pain  Outcome: Progressing     Problem: SAFETY ADULT  Goal: Patient will remain free of falls  Description  INTERVENTIONS:  - Assess patient frequently for physical needs  -  Identify cognitive and physical deficits and behaviors that affect risk of falls    -  [place] fall precautions as indicated by assessment   - Educate patient/family on patient safety including physical limitations  - Instruct patient to call for assistance with activity based on assessment  - Modify environment to reduce risk of injury  - Consider OT/PT consult to assist with strengthening/mobility  Outcome: Progressing  Goal: Maintain or return to baseline ADL function  Description  INTERVENTIONS:  -  Assess patient's ability to carry out ADLs; assess patient's baseline for ADL function and identify physical deficits which impact ability to perform ADLs (bathing, care of mouth/teeth, toileting, grooming, dressing, etc )  - Assess/evaluate cause of self-care deficits   - Assess range of motion  - Assess patient's mobility; develop plan if impaired  - Assess patient's need for assistive devices and provide as appropriate  - Encourage maximum independence but intervene and supervise when necessary  - Involve family in performance of ADLs  - Assess for home care needs following discharge   - Consider OT consult to assist with ADL evaluation and planning for discharge  - Provide patient education as appropriate  Outcome: Progressing  Goal: Maintain or return mobility status to optimal level  Description  INTERVENTIONS:  - Assess patient's baseline mobility status (ambulation, transfers, stairs, etc )    - Identify cognitive and physical deficits and behaviors that affect mobility  - Identify mobility aids required to assist with transfers and/or ambulation (gait belt, sit-to-stand, lift, walker, cane, etc )  - [place] fall precautions as indicated by assessment  - Record patient progress and toleration of activity level on Mobility SBAR; progress patient to next Phase/Stage  - Instruct patient to call for assistance with activity based on assessment  - Consider rehabilitation consult to assist with strengthening/weightbearing, etc   Outcome: Progressing     Problem: DISCHARGE PLANNING  Goal: Discharge to home or other facility with appropriate resources  Description  INTERVENTIONS:  - Identify barriers to discharge w/patient and caregiver  - Arrange for needed discharge resources and transportation as appropriate  - Identify discharge learning needs (meds, wound care, etc )  - Arrange for interpretive services to assist at discharge as needed  - Refer to Case Management Department for coordinating discharge planning if the patient needs post-hospital services based on physician/advanced practitioner order or complex needs related to functional status, cognitive ability, or social support system  Outcome: Progressing     Problem: Knowledge Deficit  Goal: Patient/family/caregiver demonstrates understanding of disease process, treatment plan, medications, and discharge instructions  Description  Complete learning assessment and assess knowledge base    Interventions:  - Provide teaching at level of understanding  - Provide teaching via preferred learning methods  Outcome: Progressing

## 2020-02-16 NOTE — ASSESSMENT & PLAN NOTE
· Had complained about approximately 5 minute episode of chest pain following fall  · EKG essentially unchanged from prior however troponin 0 07 on initial check in with known history of CAD  · Possibly type 2 in setting of fall, however will admit as observation, trend 2 further troponin, serial EKGs, telemetry monitoring  · Will request Cardiology inputs  · Give ASA 325mg x1

## 2020-02-16 NOTE — ASSESSMENT & PLAN NOTE
· Blood pressure above goal on admission, possibly in setting of fall  · Pain management for fall as appropriate; if this is not improve blood pressure will initiate p r n  IV antihypertensive  · Otherwise continue home antihypertensives with hold parameters and monitor blood pressure per protocol

## 2020-02-16 NOTE — ED PROVIDER NOTES
H&P Exam - Trauma   Maria Eugenia Mooney 80 y o  female MRN: [id number]  Unit/Bed#: ED 01/ED 01 Encounter: [id number]    Assessment/Plan   Trauma Alert: Trauma Acuity: C  Model of Arrival: Mode of Arrival: ALS via    Trauma Team: Current Providers  Attending Provider: Danielle Andrade MD  Registered Nurse: Roger King, RN  Resident: William Forbes DO  Resident: Arvin Walters DO  ED Technician: Esperanza Hughes  Consultants: None      Trauma Active Problems:  Head injury    Trauma Plan:  CT head neck    Chief Complaint:   Chief Complaint   Patient presents with    Fall     Level C        History of Present Illness   HPI:  Maria Eugenia Mooney is a 80 y o  female who presents with head injury after fall downstairs  Mechanism:Details of Incident: pt was walking up stairs when she fall backwards landing on butt, - LOC + head lac to left forehead Injury Date: 02/15/20        HPI   Patient was in her basement doing laundry, was reportedly walking up stairs when she lost her balance at the 3rd or 4th stair from the bottom of the staircase, causing her to fall and strike her frontal head as well as her bilateral forearms and her left knee  The patient denied loss of consciousness  The patient states that while she was on the floor she had some nausea but no vomiting  The patient had a transient episode of chest pain  Review of Systems  The patient reports that she is having pain in her head, pain in her bilateral forearms, and pain in her left knee  Ten systems reviewed negative except as noted in the history of present illness        Historical Information     Immunizations:   Immunization History   Administered Date(s) Administered     Influenza (IM) Preservative Free 11/06/2014    INFLUENZA 10/14/2011, 01/30/2013, 11/29/2013, 12/15/2014, 10/19/2015, 10/12/2016, 10/05/2017, 10/25/2018, 10/23/2019    Influenza Split High Dose Preservative Free IM 10/19/2015, 10/12/2016, 10/05/2017, 10/23/2019    Pneumococcal Conjugate 13-Valent 10/19/2015    Pneumococcal Polysaccharide PPV23 10/12/2010    Tdap 12/24/2012, 09/10/2014    Zoster 11/16/2017       Past Medical History:   Diagnosis Date    Acquired absence of kidney     Frequency of micturition     GERD (gastroesophageal reflux disease)     Hypertension     Malignant neoplasm of left kidney, except renal pelvis (HCC)     Nocturia     Personal history of other malignant neoplasm of kidney     Renal mass, left     UTI (urinary tract infection)      Family History   Problem Relation Age of Onset    Diabetes Father     Heart failure Brother      Past Surgical History:   Procedure Laterality Date    RADICAL NEPHRECTOMY Left 2013     Social History     Socioeconomic History    Marital status:       Spouse name: None    Number of children: None    Years of education: None    Highest education level: None   Occupational History    None   Social Needs    Financial resource strain: None    Food insecurity:     Worry: None     Inability: None    Transportation needs:     Medical: None     Non-medical: None   Tobacco Use    Smoking status: Never Smoker    Smokeless tobacco: Never Used   Substance and Sexual Activity    Alcohol use: Not Currently    Drug use: Not Currently    Sexual activity: None   Lifestyle    Physical activity:     Days per week: None     Minutes per session: None    Stress: None   Relationships    Social connections:     Talks on phone: None     Gets together: None     Attends [employer] service: None     Active member of club or organization: None     Attends meetings of clubs or organizations: None     Relationship status: None    Intimate partner violence:     Fear of current or ex partner: None     Emotionally abused: None     Physically abused: None     Forced sexual activity: None   Other Topics Concern    None   Social History Narrative    None       Family History: non-contributory    Meds/Allergies   Prior to Admission Medications   Prescriptions Last Dose Informant Patient Reported? Taking?    Cyanocobalamin (B-12) 1000 MCG CAPS  Self Yes No   Sig: Take 1 tablet by mouth daily   GLIPIZIDE XL 5 MG 24 hr tablet   Yes No   Sig: Take 5 mg by mouth daily   aspirin 81 MG tablet  Self Yes No   Sig: Take 1 tablet by mouth daily   betamethasone valerate (VALISONE) 0 1 % ointment   Yes No   Sig: APPLY TO THE AFFECTED AREA SMALL AMOUNT 1 TO 2 TIMES A WEEK FOR IRRITATION/ITCHING   docusate sodium (COLACE) 100 mg capsule  Self Yes No   Sig: Take 1 capsule by mouth 2 (two) times a day   furosemide (LASIX) 20 mg tablet   No No   Sig: Take 1 tablet (20 mg total) by mouth daily as needed (lower extremity edema)   levothyroxine 75 mcg tablet  Self Yes No   Sig: Take 1 tablet by mouth daily   magnesium chloride (MAG64) 64 MG TBEC EC tablet   Yes No   Sig: Take 1 tablet by mouth   metFORMIN (GLUCOPHAGE-XR) 750 mg 24 hr tablet   Yes No   Sig: TAKE 1 TABLET (750 MG TOTAL) BY MOUTH DAILY WITH BREAKFAST    metoprolol succinate (TOPROL-XL) 25 mg 24 hr tablet  Self Yes No   Sig: Take 1 tablet by mouth daily   mupirocin (BACTROBAN) 2 % ointment   No No   Sig: Apply topically 3 (three) times a day for 10 days   omeprazole (PRILOSEC) 20 mg delayed release capsule  Self Yes No   Sig: Take 1 capsule by mouth daily   simvastatin (ZOCOR) 40 mg tablet  Self Yes No   Sig: Take 1 tablet by mouth daily      Facility-Administered Medications: None       Allergies   Allergen Reactions    Ace Inhibitors      Other reaction(s): Hypotension    Cephalexin     Clindamycin     Doxycycline     Erythromycin Base     Lactate     Levaquin [Levofloxacin]     Penicillins     Procainamide     Quinidine (Non-Therapeutic)     Sulfa Antibiotics        PHYSICAL EXAM    PE limited by:  No limitations to physical exam    Objective   Vitals:   First set: Temperature: 97 5 °F (36 4 °C) (02/15/20 1915)  Pulse: 70 (02/15/20 1915)  Respirations: 16 (02/15/20 1915)  Blood Pressure: 155/100 (02/15/20 1915)  SpO2: 99 % (02/15/20 1915)    Primary Survey:   (A) Airway:  Intact  (B) Breathing:  Breath sounds bilaterally  (C) Circulation: Pulses:   radial  4/4  (D) Disabliity:  GCS Total:  15  (E) Expose:  Completed    Secondary Survey: (Click on Physical Exam tab above)  Physical Exam    Invasive Devices     Peripheral Intravenous Line            Peripheral IV 02/15/20 Right Antecubital less than 1 day                Lab Results:   Results Reviewed     Procedure Component Value Units Date/Time    CBC and differential [[id number]]  (Abnormal) Collected:  02/15/20 1952    Lab Status:  Final result Specimen:  Blood from Arm, Right Updated:  02/15/20 2000     WBC 9 44 Thousand/uL      RBC 4 00 Million/uL      Hemoglobin 12 1 g/dL      Hematocrit 38 7 %      MCV 97 fL      MCH 30 3 pg      MCHC 31 3 g/dL      RDW 13 1 %      MPV 10 3 fL      Platelets 707 Thousands/uL      nRBC 0 /100 WBCs      Neutrophils Relative 82 %      Immat GRANS % 0 %      Lymphocytes Relative 10 %      Monocytes Relative 7 %      Eosinophils Relative 1 %      Basophils Relative 0 %      Neutrophils Absolute 7 64 Thousands/µL      Immature Grans Absolute 0 03 Thousand/uL      Lymphocytes Absolute 0 93 Thousands/µL      Monocytes Absolute 0 70 Thousand/µL      Eosinophils Absolute 0 10 Thousand/µL      Basophils Absolute 0 04 Thousands/µL     Comprehensive metabolic panel [[id number]] Collected:  02/15/20 1952    Lab Status: In process Specimen:  Blood from Arm, Right Updated:  02/15/20 1955    Troponin I [[id number]] Collected:  02/15/20 1952    Lab Status: In process Specimen:  Blood from Arm, Right Updated:  02/15/20 1955                 Imaging Studies:   Direct to CT:  Yes  XR chest 2 views   ED Interpretation by Sofia Kee DO (02/15 2008)   No effusions, focal consolidations, ptx, free air      CT head without contrast   Final Result by Chandu Riddle MD (02/15 1951)   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 and small bilateral basal ganglia lacunar infarctions      No acute intracranial abnormality  Findings personally discussed by phone with Dr Bettie Ortega at 7:50 PM, 2/15/2020            Workstation performed: [id number]         CT cervical spine without contrast   Final Result by Jersey Amaral MD (02/15 1952)   Multilevel degenerative spondylosis  Developing DISH      No acute cervical spine fracture or traumatic malalignment  Findings personally discussed by phone with Dr Bettie Ortega at 7:50 PM, 2/15/2020                Workstation performed: [id number]             Other Studies:  Evaluation for chest pain with EKG and lab work    Code Status: Prior  Advance Directive and Living Will:      Power of :    POLST:      Procedures  Procedures         ED Course         MDM      Disposition  [facility] Transfer: No  Final diagnoses:   Fall, initial encounter   Abrasion     Time reflects when diagnosis was documented in both MDM as applicable and the Disposition within this note     Time User Action Codes Description Comment    2/15/2020  8:09 PM Kaleb Oquendo Add [Y10  NPYX] Fall, initial encounter     2/15/2020  8:09 PM Kaleb Oquendo Add [T14  [address] Abrasion       ED Disposition     ED Disposition Condition Date/Time Comment    Discharge Stable Sat Feb 15, 2020  8:09 PM Sergey Wilson discharge to home/self care  Return precautions given and questions answered  Follow-up Information     Follow up With Specialties Details Why Contact Info    Yecenia Blas MD Internal Medicine Call in 1 day To recheck symptoms and follow up on your ER visit [phone] [address] [place] [address]  [phone]          Patient's Medications   Discharge Prescriptions    No medications on file     No discharge procedures on file      PDMP Review     None          ED Provider  Electronically Signed by         Denise Manning Rosamaria Ruvalcaba MD  02/15/20 2043

## 2020-02-16 NOTE — PHYSICAL THERAPY NOTE
Physical Therapy Evaluation     Patient Name: Ger GREENE Date: 2/16/2020     Problem List  Principal Problem:    Chest pain  Active Problems:    Hypothyroidism    CKD (chronic kidney disease)    Hyperlipidemia    Hypertension    Elevated troponin    CAD (coronary artery disease)    Type 2 diabetes mellitus, without long-term current use of insulin ([facility] [address] )    Fall at home, initial encounter       Past Medical History  Past Medical History:   Diagnosis Date    Acquired absence of kidney     Frequency of micturition     GERD (gastroesophageal reflux disease)     Hypertension     Malignant neoplasm of left kidney, except renal pelvis (HCC)     Nocturia     Personal history of other malignant neoplasm of kidney     Renal mass, left     UTI (urinary tract infection)         Past Surgical History  Past Surgical History:   Procedure Laterality Date    RADICAL NEPHRECTOMY Left 2013 02/16/20 0850   Note Type   Note type Eval only   Pain Assessment   Pain Assessment 0-10   Pain Score 5   Pain Type Acute pain   Pain Location Arm   Pain Orientation Left   Home Living   Type of Home House  (3 YVES, Ranch Style home)   Home Layout One level; Laundry in basement;Stairs to enter with rails  (3 YVES, FF to basement )   Bathroom Shower/Tub Tub/shower unit   Bathroom Toilet Standard   Home Equipment Cane;Walker   Additional Comments Pt uses no AD for houseld hold distances and short community distances  Uses a RW for longer community distances   Pt resides alone and reports no local family available to assist  Life alert (-)    Prior Function   Level of [place] Independent with ADLs and functional mobility   Lives With Alone   ADL Assistance Independent   IADLs Independent   Falls in the last 6 months 1 to 4  ("multiple" falls)   Vocational Retired   [address]    Restrictions/Precautions   [facility] Bearing Precautions Per Order No Other Precautions Chair Alarm; Bed Alarm; Fall Risk;Pain;Telemetry   Cognition   Overall Cognitive Status WFL   Arousal/Participation Cooperative   Orientation Level Oriented X4   Memory Decreased recall of precautions   Following Commands Follows one step commands without difficulty   Comments Pt is pleasant and coopeartive  Mild decrease insight into deficits, pt asked therapist to not tell the Nurse about her becoming dizzing during session with a LOB  RLE Assessment   RLE Assessment   (Grossly 4/5)   LLE Assessment   LLE Assessment   (Grossly 4/5)   Bed Mobility   Supine to Sit 3  Moderate assistance   Additional items Assist x 1; Increased time required;Verbal cues   Sit to Supine 5  Supervision   Additional items Assist x 1; Increased time required   Additional Comments Simulated bed-set up at home with [facility] flat and 1 hand rail; pt required mod A x 1 for UB support for supine to sit  Able to move from sit to supine without physical assistance  Transfers   Sit to Stand 3  Moderate assistance  (Progressed to min A x 1)   Additional items Assist x 1; Increased time required;Verbal cues   Stand to Sit 5  Supervision   Additional items Assist x 1; Increased time required;Verbal cues   Additional Comments Performed with RW; Pt initally required mod A x 1 for moderate force production into standing, progresed to min A x 1  1 epsiode of dizziness with LOB, required mod A x 1 to recover LOB and return back to seated poistion  Pt reports dizziness lasted about 5 seconds, BP: 157/68, RN notified  Pt left seated OOB with chair alarm on  Ambulation/Elevation   Gait pattern Short stride; Excessively slow   Gait Assistance   (Supervision with RW; Min A x 1 without DME )   Additional items Assist x 1;Verbal cues   Assistive Device   (RW and no AD)   Distance 50 feet x 2 with RW; 10 feet without AD   Stair Management Assistance 4  Minimal assist   Additional items Assist x 1;Verbal cues   Stair Management Technique One rail R;Step to pattern  (BUE on 1 handrail)   Number of Stairs 6   Balance   Static Sitting Fair   Dynamic Sitting Fair   Static Standing Fair -   Dynamic Standing Fair -   Ambulatory Poor +  (without AD)   Endurance Deficit   Endurance Deficit Yes   Endurance Deficit Description episode of dizziness   Activity Tolerance   Activity Tolerance Patient limited by fatigue   Medical Staff Made Aware CM   Nurse Made Aware Yes, RN Owen Reyez   Assessment   Prognosis Good   Problem List Decreased strength;Decreased endurance; Impaired balance;Decreased mobility; Decreased safety awareness;Pain;Decreased skin integrity   Assessment Pt is 80 y o  female seen for high complexity PT evaluation s/p admission to [facility] on 2/14/20 after a fall at time, pt fell down 3 steps, denies LOC, after fall c/o 5 minutes of central chest pain  Comorbidities affecting pt's physical performance at time of assessment include elevated troponin, HTN, hyperlipidemia, LBBB at baseline, non-insulin dependent DM, hypothyroidism, CKD, and history of ischemic cardiomyopathy  Pt resides alone in a ranch style home with 3 YVES  Pt independent with ADLs, IADLs, and functional mobility  Uses a RW for longer community distance  Upon evaluation, pt required min-mod A for transfers, min A x 1 for bed mobility, supervision for ambulation, and min A x 1 for stair management  Pt ambulated 50 feet x 2 with RW at close supervision, no LOB  Pt negotiated 6 steps with L handrail and step to pattern and min A x 1 for stability  Post-activity, pt had a episode of dizziness for approx 5 seconds and LOB, required mod A x 1 to control pt's descent into chair to recover LOB  Pt reported dizziness subsided, BP: 157/68, RN notified  At this time, there are concerns about fall risk as patient reported multiple falls in the past and in addition pt tested 38/56 on [place] Balance Scale on 1/28/20 (@ [facility]) which indicates a greater risk of falling (<45/56 cut off score)   Pt lives at home alone, receives no daily check ups and reports she doesn't have close family to assist her  Patient's decreased mobility level and increased fall risk is secondary to deficits in dynamic/ambulatory balance, gait, generalized weakness/deconditioning, onset of dizziness after activity,   Current clinical presentation is unstable/unpredictable seen in pt's presentation of ongoing medical management, increased reliance on assistance compared to PLOF, onset of dizziness with activity, and significant PMH  Pt to benefit from continued PT tx to address deficits as defined above and maximize level of functional independent mobility and consistency  From PT/mobility standpoint, recommendation at time of d/c would be STR pending progress in order to facilitate return to PLOF  Pt may progress to home with [facility] but recommend increased family support; pending increased independence  Barriers to Discharge Decreased caregiver support; Inaccessible home environment   Barriers to Discharge Comments Pt resides ALONE;   Goals   Patient Goals To go home   STG Expiration Date 03/01/20   Short Term Goal #1 In 1-2 weeks, the patient will complete the following 1) Perform all aspect of bed mobility independently 2) Perform functional transfer with LRAD independently  3) Ambulate >150 feet with LRAD at mod I level  4) Negotiate 12 steps with 1 handrail and supervision 5) Patient will improve dynamic standing balance from poor + to fair + in order to perform everyday activities  6) Patient will continue with ongoing rehab services for family education, DME, and D/C planning    Plan   Treatment/Interventions Functional transfer training;LE strengthening/ROM; Therapeutic exercise;Elevations; Endurance training;Patient/family training;Gait training;Bed mobility   PT Frequency   (3-5x/wk )   Recommendation   Recommendation Post acute IP rehab  (may progress to homePT )   Equipment Recommended Walker   PT - OK to Discharge Yes   Modified Empire Scale   Modified [place] Scale 4   Barthel Index   Feeding 10   Bathing 0   Grooming Score 5   Dressing Score 10   Bladder Score 10   Bowels Score 10   Toilet Use Score 5   Transfers (Bed/Chair) Score 10   Mobility (Level Surface) Score 10   Stairs Score 5   Barthel Index Score 75       Fabian Clark PT, DPT

## 2020-02-16 NOTE — PROGRESS NOTES
[facility]   Progress Note - Marie Dlecid 1/3/1932, 80 y o  female MRN: [id number]    Unit/Bed#: [facility] 701-01 Encounter: [id number]    Primary Care Provider: Kristian Garnica MD   Date and time admitted to hospital: 2/15/2020  7:12 PM        * Chest pain  Assessment & Plan  · Had complained about approximately 5 minute episode of chest pain following fall  · EKG essentially unchanged from prior however troponin 0 07 on initial check  And increase noted up to 0 28  Known history of CAD   given on admission   · Possibly type 2 in setting of fall, however will admit as observation, trend 2 further troponin, serial EKGs, telemetry monitoring  · Non MI trop elevation per cardiology     Fall at home, initial encounter  Assessment & Plan  · Patient with mechanical fall at home as above, with multiple abrasions however trauma imaging negative for acute injury  · Multiple skin tears BL forearms, left elbow and left knee  Dressings in place   · Apparently has had several near episodes in past and follows with outpatient PT/OT  · PT and OT ordered  · PT recommending STR  · OT pending  · Patient agreeable     Elevated troponin  Assessment & Plan  · Troponin 0 07 on presentation, may be in setting of fall however had complained of chest pain and additionally with cardiac history as below    0 26, 0 28 followed by 0 27   · Cardiology consulted- non MI trop elevation     CAD (coronary artery disease)  Assessment & Plan  · S/p stenting x1 to RCA approximately 2016  · Continue ASA, beta blocker, statin    Type 2 diabetes mellitus, without long-term current use of insulin [facility])  Assessment & Plan  Lab Results   Component Value Date    HGBA1C 6 9 (H) 02/16/2020       Recent Labs     02/16/20  0703 02/16/20  1104   POCGLU 101 190*       Blood Sugar Average: Last 72 hrs:  · (P) 145 5 hold metformin while inpatient  · Initiate SSI with Accu-Cheks, carb controlled diet  · Initiate hypoglycemia protocol    CKD (chronic kidney disease)  Assessment & Plan  · Baseline creat 1 3-1 6   1 58 on admission  1 43 today   · Monitor with BMP  · Avoid nephrotoxins as able    Hypertension  Assessment & Plan  · Blood pressure above goal on admission, possibly in setting of fall  · Pain management for fall as appropriate; if this is not improve blood pressure will initiate p r n  IV antihypertensive  · Otherwise continue home antihypertensives with hold parameters and monitor blood pressure per protocol    Hyperlipidemia  Assessment & Plan  · Continue statin therapy    Hypothyroidism  Assessment & Plan  · Continue Synthroid      VTE Pharmacologic Prophylaxis:   Pharmacologic: Heparin  Mechanical VTE Prophylaxis in Place: Yes    Patient Centered Rounds: I have performed bedside rounds with nursing staff today  Discussions with Specialists or Other Care Team Provider: cardiology    Education and Discussions with Family / Patient: patient    Time Spent for Care: 20 minutes  More than 50% of total time spent on counseling and coordination of care as described above  Current Length of Stay: 0 day(s)    Current Patient Status: Observation   Certification Statement: The patient will continue to require additional inpatient hospital stay due to further evaluation and monitoring, pending OT and possible placement     Discharge Plan: pending possible placement tomorrow     Code Status: Level 3 - DNAR and DNI      Subjective:   Patient sitting in chair  Denies chest pain, dizziness and shortness of breath  Having some discomfort on elbow skin tear  Objective:     Vitals:   Temp (24hrs), Av 9 °F (36 1 °C), Min:96 °F (35 6 °C), Max:97 5 °F (36 4 °C)    Temp:  [96 °F (35 6 °C)-97 5 °F (36 4 °C)] 97 3 °F (36 3 °C)  HR:  [60-94] 60  Resp:  [16-20] 20  BP: (145-164)/() 156/76  SpO2:  [98 %-100 %] 100 %  Body mass index is 26 04 kg/m²  Input and Output Summary (last 24 hours):        Intake/Output Summary (Last 24 hours) at 2/16/2020 1210  Last data filed at 2/16/2020 0900  Gross per 24 hour   Intake 120 ml   Output --   Net 120 ml       Physical Exam:     Physical Exam   Constitutional: She is oriented to person, place, and time  She appears well-developed and well-nourished  HENT:   Head: Normocephalic and atraumatic  Eyes: Pupils are equal, round, and reactive to light  Neck: Normal range of motion  Neck supple  Cardiovascular: Normal rate and regular rhythm  Pulmonary/Chest: Effort normal and breath sounds normal  No respiratory distress  Abdominal: Soft  Bowel sounds are normal    Musculoskeletal: Normal range of motion  Neurological: She is alert and oriented to person, place, and time  Skin: Skin is warm and dry  BL forearm skin tears  Left knee abrasion  Dressing clean dry and intact  Additional Data:     Labs:    Results from last 7 days   Lab Units 02/16/20 0645  02/15/20  1952   WBC Thousand/uL 7 90  --  9 44   HEMOGLOBIN g/dL 10 8*  --  12 1   HEMATOCRIT % 34 5*  --  38 7   PLATELETS Thousands/uL 216   < > 254   NEUTROS PCT %  --   --  82*   LYMPHS PCT %  --   --  10*   MONOS PCT %  --   --  7   EOS PCT %  --   --  1    < > = values in this interval not displayed  Results from last 7 days   Lab Units 02/16/20  0645 02/15/20  1952   SODIUM mmol/L 140 139   POTASSIUM mmol/L 4 6 4 7   CHLORIDE mmol/L 109* 107   CO2 mmol/L 23 24   BUN mg/dL 33* 34*   CREATININE mg/dL 1 43* 1 58*   ANION GAP mmol/L 8 8   CALCIUM mg/dL 9 2 9 3   ALBUMIN g/dL  --  3 6   TOTAL BILIRUBIN mg/dL  --  0 37   ALK PHOS U/L  --  152*   ALT U/L  --  17   AST U/L  --  28   GLUCOSE RANDOM mg/dL 99 165*         Results from last 7 days   Lab Units 02/16/20  1104 02/16/20  0703   POC GLUCOSE mg/dl 190* 101     Results from last 7 days   Lab Units 02/16/20 0645   HEMOGLOBIN A1C % 6 9*               * I Have Reviewed All Lab Data Listed Above  * Additional Pertinent Lab Tests Reviewed:  [facility] Admission Reviewed    Imaging:    Imaging Reports Reviewed Today Include: none  Imaging Personally Reviewed by Myself Includes:  none    Recent Cultures (last 7 days):           Last 24 Hours Medication List:     Current Facility-Administered Medications:  acetaminophen 650 mg Oral Q6H PRN Bhavik Ophelia, DO   aspirin 81 mg Oral Daily Bhavik Ophelia, DO   cyanocobalamin 1,000 mcg Oral Daily Bhavik Ophelia, DO   docusate sodium 100 mg Oral BID Bhavik Ophelia, DO   heparin (porcine) 5,000 Units Subcutaneous Q8H [address] Bhavik Ophelia, DO   insulin lispro 1-5 Units Subcutaneous TID AC Bhavik Ophelia, DO   insulin lispro 1-5 Units Subcutaneous HS Bhavik Ophelia, DO   levothyroxine 75 mcg Oral Early Morning Bhavik Villedad, DO   magnesium chloride 64 mg Oral Daily Bhavik Villedad, DO   metoprolol succinate 25 mg Oral Daily Bhavik Villedad, DO   pantoprazole 40 mg Oral Daily Before Breakfast Bhavik Villedad, DO   pravastatin 80 mg Oral Daily With Bong Snow DO        Today, Patient Was Seen By: YUSUF Vega    ** Please Note: Dictation voice to text software may have been used in the creation of this document   **

## 2020-02-16 NOTE — CONSULTS
Consultation - Cardiology  Robert Mendez 80 y o  female MRN: [id number]  Unit/Bed#: [facility] 701-01 Encounter: [id number]      Inpatient consult to Cardiology  Consult performed by: Susana Rosales PA-C  Consult ordered by: Love Rucker DO          History of Present Illness   Physician Requesting Consult: Catalino Zamorano MD  Reason for Consult / Principal Problem: Chest pain    Assessment/Plan   Assessment:  1  Chest pain in setting of mechanical fall  2  Elevated troponin  - trending 0 07 - 0 26 - 0 28 - 0 27  3  Preserved LVEF per echo 9/2019  4  History of CAD  - prior stent to RCA (2014) forlowing NSTEMI  - maintained on aspirin, statin, beta blocker  5  History of ischemic cardiomyopathy  - improved EF now from 40% in 2014   - on diuretic on PRN basis  6  Essential hypertension  - maintained on metoprolol succinate 25mg daily  7  Hyperlipidemia  - maintained on simvastatin 40mg daily   8  LBBB at baseline  9  Non-insulin dependent diabetes mellitus  10  Hypothyroidism  11  CKD  - status post left radical nephrectomy  - creatinine of 1 43 / basline around 1 3 - 1 6    Plan:  1  Fall - Her fall appears to be mechanical in nature  She has not been hypotensive at any point during her admission here  Does admit to balance issues  Would agree with primary team's decision with sending her to rehab as she has had other falls in the past as well  2  Chest pain - Patient does admit to chest pain with palpation of right upper chest  Most likely costochondritis from fall  3  Elevated troponin - Only mild elevation with plateau  No ischemic changes on EKG  NSTEMI type II in the setting of chronic renal insufficiency and hypertension      HPI: Robert Mendez is a 80y o  year old female with CAD with prior stent, history of ischemic cardiomyopathy now improved EF to 55%, hypertension, hyperlipidemia, left bundle branch block at baseline, non-insulin-dependent diabetes mellitus, hypothyroidism, and CKD status post left radical nephrectomy  She presents to the emergency room on 02/15 after she fell backwards down the stairs  She reports that she was coming back up the steps from doing laundry, set her laundry down, normally holds the rail to steady herself but did not this time  She then fell backwards down the steps  She reports being down for at least half an hour at which point her phone buzzed and she was able to locate it and call her son in law  She admitted to EMS that she had 5 minutes of chest pain when she was down  She has not had any further chest pain since  She has also not had chest pain in the past (even prior to her stent)  She did apparently have an episode of lightheadedness this morning when walking with PT  She does not recall this put blood pressure was obtained and she was not hypotensive and no arrhythmias on tele at that time either  Patient does have a history of falls - was last seen here at [facility] in     Primary Cardiologist: Dr Stephanie Dinero of [facility]    Cardiac testing: Per outside reports, cath with JAMAAL to RCA in 2014 with normal nuclear stress test:  Nuclear stress 1/31/17  1  No evidence of ischemia  Small infarct involving the inferoapex  2  Septal dyskinesia, secondary to left bundle branch block  Otherwise, normal  left ventricular wall motion and ejection fraction, calculated at 56%  Echo performed at [facility] on 9/15/2019 showing EF of 55% with mild regurg of valves and paradoxical motion of ventricular septum (likely due to LBBB?)    TELE: predominantly sinus rhythm with LBBB  Bradycardia in the 40's overnight    EKG:         Review of Systems  ROS as noted above, otherwise 12 point review of systems was performed and is negative       Historical Information   Past Medical History:   Diagnosis Date    Acquired absence of kidney     Frequency of micturition     GERD (gastroesophageal reflux disease)     Hypertension     Malignant neoplasm of left kidney, except renal pelvis ([address] )     Nocturia     Personal history of other malignant neoplasm of kidney     Renal mass, left     UTI (urinary tract infection)      Past Surgical History:   Procedure Laterality Date    RADICAL NEPHRECTOMY Left 2013     Social History     Substance and Sexual Activity   Alcohol Use Not Currently     Social History     Substance and Sexual Activity   Drug Use Not Currently     Social History     Tobacco Use   Smoking Status Never Smoker   Smokeless Tobacco Never Used     Family History:   Family History   Problem Relation Age of Onset    Diabetes Father     Heart failure Brother        Meds/Allergies   Hospital Medications:   Current Facility-Administered Medications   Medication Dose Route Frequency    acetaminophen (TYLENOL) tablet 650 mg  650 mg Oral Q6H PRN    aspirin chewable tablet 81 mg  81 mg Oral Daily    cyanocobalamin (VITAMIN B-12) tablet 1,000 mcg  1,000 mcg Oral Daily    docusate sodium (COLACE) capsule 100 mg  100 mg Oral BID    heparin (porcine) subcutaneous injection 5,000 Units  5,000 Units Subcutaneous Q8H [address]    insulin lispro (HumaLOG) 100 units/mL subcutaneous injection 1-5 Units  1-5 Units Subcutaneous TID AC    insulin lispro (HumaLOG) 100 units/mL subcutaneous injection 1-5 Units  1-5 Units Subcutaneous HS    levothyroxine tablet 75 mcg  75 mcg Oral Early Morning    magnesium chloride (MAG64) EC tablet TBEC 64 mg  64 mg Oral Daily    metoprolol succinate (TOPROL-XL) 24 hr tablet 25 mg  25 mg Oral Daily    pantoprazole (PROTONIX) EC tablet 40 mg  40 mg Oral Daily Before Breakfast    pravastatin (PRAVACHOL) tablet 80 mg  80 mg Oral Daily With Dinner     Home Medications:   Medications Prior to Admission   Medication    aspirin 81 MG tablet    betamethasone valerate (VALISONE) 0 1 % ointment    Cyanocobalamin (B-12) 1000 MCG CAPS    levothyroxine 75 mcg tablet    metFORMIN (GLUCOPHAGE-XR) 750 mg 24 hr tablet    metoprolol succinate (TOPROL-XL) 25 mg 24 hr tablet    simvastatin (ZOCOR) 40 mg tablet    docusate sodium (COLACE) 100 mg capsule    furosemide (LASIX) 20 mg tablet    magnesium chloride (MAG64) 64 MG TBEC EC tablet    mupirocin (BACTROBAN) 2 % ointment    omeprazole (PRILOSEC) 20 mg delayed release capsule       Allergies   Allergen Reactions    Ace Inhibitors      Other reaction(s): Hypotension    Cephalexin     Clindamycin     Doxycycline     Erythromycin Base     Lactate     Levaquin [Levofloxacin]     Penicillins     Procainamide     Quinidine (Non-Therapeutic)     Sulfa Antibiotics        Objective   Vitals: Blood pressure 157/68, pulse 61, temperature (!) 97 3 °F (36 3 °C), resp  rate 19, weight 66 7 kg (147 lb), SpO2 100 %, not currently breastfeeding  Orthostatic Blood Pressures      Most Recent Value   Blood Pressure  157/68 filed at 02/16/2020 2189          No intake or output data in the 24 hours ending 02/16/20 0925    Invasive Devices     Peripheral Intravenous Line            Peripheral IV 02/15/20 Right Antecubital less than 1 day                Physical Exam   GEN: NAD, alert and oriented, well appearing  SKIN: dry without significant rashes, laceration and ecchymosis of forehead and upper extremities   HEENT: NCAT, PERRL, EOMs intact  NECK: No JVD or carotid bruits appreciated, goiter present? CARDIOVASCULAR: RRR, normal S1, S2 without murmurs, rubs, or gallops appreciated  LUNGS: Clear to auscultation bilaterally without wheezes, rhonchi, or rales  ABDOMEN: Soft, nontender, nondistended  EXTREMITIES/VASCULAR: perfused without clubbing, cyanosis, or edema b/l  PSYCH: Normal mood and affect  NEURO: CN ll-Xll grossly intact    Lab Results: I have personally reviewed pertinent lab results      Results from last 7 days   Lab Units 02/16/20  0645 02/16/20  0044 02/15/20  1952   WBC Thousand/uL 7 90  --  9 44   HEMOGLOBIN g/dL 10 8*  --  12 1   HEMATOCRIT % 34 5*  --  38 7   PLATELETS Thousands/uL 216 237 254     Results from last 7 days   Lab Units 20  0645 02/15/20  1952   POTASSIUM mmol/L 4 6 4 7   CHLORIDE mmol/L 109* 107   CO2 mmol/L 23 24   BUN mg/dL 33* 34*   CREATININE mg/dL 1 43* 1 58*   CALCIUM mg/dL 9 2 9 3               Imaging: I have personally reviewed pertinent reports  ECHO:   Results for orders placed during the hospital encounter of 19   Echo complete with contrast if indicated    Narrative [address]  [facility] [place] [facility]  [phone]    Transthoracic Echocardiogram  2D, M-mode, Doppler, and Color Doppler    Study date:  15-Sep-2019    Patient: Aspen Carcamo  MR number: [id number]  Account number: [de-identified]  : 1932  Age: 80 years  Gender: Female  Status: Inpatient  Location: Bedside  Height: 63 in  Weight: 148 7 lb  BP: 79/ 58 mmHg    Indications: Syncope and collapse  Diagnoses: R55  - Syncope and collapse    Sonographer:  Ginette Bañuelos RDCS  Primary Physician:  Sheeba Montes MD  Referring Physician:  Carroll Graf PA-C  Group:  [facility]  Interpreting Physician:  José Juarez MD    SUMMARY    LEFT VENTRICLE:  Systolic function was normal  Ejection fraction was estimated to be 55 %  VENTRICULAR SEPTUM:  There was paradoxical motion  MITRAL VALVE:  There was mild regurgitation  AORTIC VALVE:  There was mild regurgitation  TRICUSPID VALVE:  There was mild regurgitation  Pulmonary artery systolic pressure was within the normal range  HISTORY: PRIOR HISTORY: Coronary artery disease, bradyarrhythmia, chronic kidney disease, hyperlipidemia, hypertension, elevated troponin levels, diabetes mellitus type 2, left radical nephrectomy  PROCEDURE: The procedure was performed at the bedside  This was a routine study  The transthoracic approach was used  The study included complete 2D imaging, M-mode, complete spectral Doppler, and color Doppler   The heart rate was 73 bpm,  at the start of the study  Images were obtained from the parasternal, apical, subcostal, and suprasternal notch acoustic windows  Echocardiographic views were limited due to lung interference  Image quality was adequate  LEFT VENTRICLE: Size was normal  Systolic function was normal  Ejection fraction was estimated to be 55 %  Wall thickness was normal  No evidence of apical thrombus  DOPPLER: Left ventricular diastolic function parameters were normal     VENTRICULAR SEPTUM: There was paradoxical motion  RIGHT VENTRICLE: The size was normal  Systolic function was normal  Wall thickness was normal     LEFT ATRIUM: Size was normal     RIGHT ATRIUM: Size was normal     MITRAL VALVE: Valve structure was normal  There was mild thickening  There was calcification, with mild chordal involvement  There was normal leaflet separation  DOPPLER: The transmitral velocity was within the normal range  There was no  evidence for stenosis  There was mild regurgitation  AORTIC VALVE: The valve was trileaflet  Leaflets exhibited mildly increased thickness and normal cuspal separation  DOPPLER: Transaortic velocity was within the normal range  There was no evidence for stenosis  There was mild  regurgitation  TRICUSPID VALVE: The valve structure was normal  There was normal leaflet separation  DOPPLER: The transtricuspid velocity was within the normal range  There was no evidence for stenosis  There was mild regurgitation  Pulmonary artery  systolic pressure was within the normal range  PULMONIC VALVE: Leaflets exhibited normal thickness, no calcification, and normal cuspal separation  DOPPLER: The transpulmonic velocity was within the normal range  There was no significant regurgitation  PERICARDIUM: There was no pericardial effusion  The pericardium was normal in appearance  AORTA: The root exhibited normal size  SYSTEMIC VEINS: IVC: The inferior vena cava was normal in size      SYSTEM MEASUREMENT TABLES    2D mode  Area; 2D mode;: 897 mm2  Area; Mean; Mean value chosen; 2D mode;: 897 mm2  RVIDd (2D): 3 4 cm  RVIDd; Mean (2D): 3 4 cm    Apical four chamber  LV MOD Diam; Recent value; End Diastole (A4C): 3 9 cm  LV MOD Diam; Recent value; End Diastole (A4C): 4 7 cm  LV MOD Diam; Recent value; End Diastole (A4C): 4 81 cm  LV MOD Diam; Recent value; End Diastole (A4C): 4 92 cm  LV MOD Diam; Recent value; End Diastole (A4C): 5 03 cm  LV MOD Diam; Recent value; End Diastole (A4C): 5 14 cm  LV MOD Diam; Recent value; End Diastole (A4C): 5 22 cm  LV MOD Diam; Recent value; End Diastole (A4C): 5 28 cm  LV MOD Diam; Recent value; End Diastole (A4C): 5 28 cm  LV MOD Diam; Recent value; End Diastole (A4C): 5 25 cm  LV MOD Diam; Recent value; End Diastole (A4C): 5 11 cm  LV MOD Diam; Recent value; End Diastole (A4C): 4 81 cm  LV MOD Diam; Recent value; End Diastole (A4C): 2 52 cm  LV MOD Diam; Recent value; End Diastole (A4C): 1 8 cm  LV MOD Diam; Recent value; End Diastole (A4C): 2 63 cm  LV MOD Diam; Recent value; End Diastole (A4C): 3 2 cm  LV MOD Diam; Recent value; End Diastole (A4C): 3 62 cm  LV MOD Diam; Recent value; End Diastole (A4C): 4 15 cm  LV MOD Diam; Recent value; End Diastole (A4C): 4 39 cm  LV MOD Diam; Recent value; End Diastole (A4C): 4 58 cm  LV MOD Diam; Recent value; End Systole (A4C): 1 04 cm  LV MOD Diam; Recent value; End Systole (A4C): 1 48 cm  LV MOD Diam; Recent value; End Systole (A4C): 1 86 cm  LV MOD Diam; Recent value; End Systole (A4C): 2 21 cm  LV MOD Diam; Recent value; End Systole (A4C): 2 52 cm  LV MOD Diam; Recent value; End Systole (A4C): 2 71 cm  LV MOD Diam; Recent value; End Systole (A4C): 2 9 cm  LV MOD Diam; Recent value; End Systole (A4C): 3 04 cm  LV MOD Diam; Recent value; End Systole (A4C): 3 14 cm  LV MOD Diam; Recent value; End Systole (A4C): 3 17 cm  LV MOD Diam; Recent value; End Systole (A4C): 3 25 cm  LV MOD Diam; Recent value; End Systole (A4C): 3 33 cm  LV MOD Diam; Recent value;  End Systole (A4C): 3 47 cm  LV MOD Diam; Recent value; End Systole (A4C): 3 63 cm  LV MOD Diam; Recent value; End Systole (A4C): 3 82 cm  LV MOD Diam; Recent value; End Systole (A4C): 3 91 cm  LV MOD Diam; Recent value; End Systole (A4C): 3 94 cm  LV MOD Diam; Recent value; End Systole (A4C): 3 94 cm  LV MOD Diam; Recent value; End Systole (A4C): 2 05 cm  LV MOD Diam; Recent value; End Systole (A4C): 3 91 cm  LVEF MOD A4C: 61 4 %  Left Ventricle diastolic major axis; Most recent value chosen; Method of Disks, Single Plane; 2D mode; Apical four chamber;: 7 85 cm  Left Ventricle systolic major axis; Most recent value chosen; Method of Disks, Single Plane; 2D mode; Apical four chamber;: 6 26 cm  Left Ventricular Diastolic Area; Most recent value chosen; Method of Disks, Single Plane; 2D mode; Apical four chamber;: 3410 mm2  Left Ventricular End Diastolic Volume; Most recent value chosen; Method of Disks, Single Plane; 2D mode; Apical four chamber;: 121 cm3  Left Ventricular End Systolic Volume; Most recent value chosen; Method of Disks, Single Plane; 2D mode; Apical four chamber;: 46 7 cm3  Left Ventricular Systolic Area; Most recent value chosen; Method of Disks, Single Plane; 2D mode; Apical four chamber;: 1860 mm2  SI (A4C): 43 5 ml/m2  SV MOD A4C: 74 3 cm3  Right Atrium Systolic Area; End Systole; 2D mode; Apical four chamber;: 1560 mm2  Right Atrium Systolic Area; Mean; Mean value chosen; End Systole; 2D mode; Apical four chamber;: 1560 mm2    M mode  Tricuspid Annular Plane Systolic Excursion; Mean; Mean value chosen; Tricuspid Annulus; M mode;: 2 09 cm  Tricuspid Annular Plane Systolic Excursion; Tricuspid Annulus; M mode;: 2 09 cm    Tissue Doppler Imaging  LV Peak Early Hurst Tissue Tam; Medial MA (TDI): 49 mm/s  Left Ventricular Peak Early Diastolic Tissue Velocity; Mean; Mean value chosen; Medial Mitral Annulus;  Tissue Doppler Imaging;: 49 mm/s    Unspecified Scan Mode  MV Peak Tam/LV Peak Tissue Tam E-Wave; Medial MA: 12 4  DT; Antegrade Flow: 354 ms  DT; Mean; Antegrade Flow: 354 ms  Dec [place]; Antegrade Flow: 1710 mm/s2  Dec [place]; Mean; Antegrade Flow: 1710 mm/s2  MV A Tam: 839 mm/s  MV E Tam: 606 mm/s  MV E/A Ratio: 0 7  MV Peak A Tam: 839 mm/s  MV Peak E Tam; Mean; Antegrade Flow: 606 mm/s  MVA (PHT): 212 mm2  PHT: 104 ms  PHT; Mean: 104 ms  Peak Grad; Mean; Regurgitant Flow: 19 mm[Hg]  Vmax; Mean; Regurgitant Flow: 2200 mm/s  Vmax; Regurgitant Flow: 2100 mm/s  Vmax; Regurgitant Flow: 2290 mm/s    [facility]    Prepared and electronically signed by    Sonia Cueva MD  Signed 15-Sep-2019 14:32:00          Cardiac testing:   ECHO:   Results for orders placed during the hospital encounter of 19   Echo complete with contrast if indicated    Narrative [address]  [phone]    Transthoracic Echocardiogram  2D, M-mode, Doppler, and Color Doppler    Study date:  15-Sep-2019    Patient: Bonnell Libman  MR number: [id number]  Account number: [de-identified]  : 1932  Age: 80 years  Gender: Female  Status: Inpatient  Location: Bedside  Height: 63 in  Weight: 148 7 lb  BP: 79/ 58 mmHg    Indications: Syncope and collapse  Diagnoses: R55  - Syncope and collapse    Sonographer:  Navin Presley RDCS  Primary Physician:  Edwin Echeverria MD  Referring Physician:  Veronika Sheehan PA-C  Group:  [facility]  Interpreting Physician:  Sonia Cueva MD    SUMMARY    LEFT VENTRICLE:  Systolic function was normal  Ejection fraction was estimated to be 55 %  VENTRICULAR SEPTUM:  There was paradoxical motion  MITRAL VALVE:  There was mild regurgitation  AORTIC VALVE:  There was mild regurgitation  TRICUSPID VALVE:  There was mild regurgitation  Pulmonary artery systolic pressure was within the normal range      HISTORY: PRIOR HISTORY: Coronary artery disease, bradyarrhythmia, chronic kidney disease, hyperlipidemia, hypertension, elevated troponin levels, diabetes mellitus type 2, left radical nephrectomy  PROCEDURE: The procedure was performed at the bedside  This was a routine study  The transthoracic approach was used  The study included complete 2D imaging, M-mode, complete spectral Doppler, and color Doppler  The heart rate was 73 bpm,  at the start of the study  Images were obtained from the parasternal, apical, subcostal, and suprasternal notch acoustic windows  Echocardiographic views were limited due to lung interference  Image quality was adequate  LEFT VENTRICLE: Size was normal  Systolic function was normal  Ejection fraction was estimated to be 55 %  Wall thickness was normal  No evidence of apical thrombus  DOPPLER: Left ventricular diastolic function parameters were normal     VENTRICULAR SEPTUM: There was paradoxical motion  RIGHT VENTRICLE: The size was normal  Systolic function was normal  Wall thickness was normal     LEFT ATRIUM: Size was normal     RIGHT ATRIUM: Size was normal     MITRAL VALVE: Valve structure was normal  There was mild thickening  There was calcification, with mild chordal involvement  There was normal leaflet separation  DOPPLER: The transmitral velocity was within the normal range  There was no  evidence for stenosis  There was mild regurgitation  AORTIC VALVE: The valve was trileaflet  Leaflets exhibited mildly increased thickness and normal cuspal separation  DOPPLER: Transaortic velocity was within the normal range  There was no evidence for stenosis  There was mild  regurgitation  TRICUSPID VALVE: The valve structure was normal  There was normal leaflet separation  DOPPLER: The transtricuspid velocity was within the normal range  There was no evidence for stenosis  There was mild regurgitation  Pulmonary artery  systolic pressure was within the normal range      PULMONIC VALVE: Leaflets exhibited normal thickness, no calcification, and normal cuspal separation  DOPPLER: The transpulmonic velocity was within the normal range  There was no significant regurgitation  PERICARDIUM: There was no pericardial effusion  The pericardium was normal in appearance  AORTA: The root exhibited normal size  SYSTEMIC VEINS: IVC: The inferior vena cava was normal in size  SYSTEM MEASUREMENT TABLES    2D mode  Area; 2D mode;: 897 mm2  Area; Mean; Mean value chosen; 2D mode;: 897 mm2  RVIDd (2D): 3 4 cm  RVIDd; Mean (2D): 3 4 cm    Apical four chamber  LV MOD Diam; Recent value; End Diastole (A4C): 3 9 cm  LV MOD Diam; Recent value; End Diastole (A4C): 4 7 cm  LV MOD Diam; Recent value; End Diastole (A4C): 4 81 cm  LV MOD Diam; Recent value; End Diastole (A4C): 4 92 cm  LV MOD Diam; Recent value; End Diastole (A4C): 5 03 cm  LV MOD Diam; Recent value; End Diastole (A4C): 5 14 cm  LV MOD Diam; Recent value; End Diastole (A4C): 5 22 cm  LV MOD Diam; Recent value; End Diastole (A4C): 5 28 cm  LV MOD Diam; Recent value; End Diastole (A4C): 5 28 cm  LV MOD Diam; Recent value; End Diastole (A4C): 5 25 cm  LV MOD Diam; Recent value; End Diastole (A4C): 5 11 cm  LV MOD Diam; Recent value; End Diastole (A4C): 4 81 cm  LV MOD Diam; Recent value; End Diastole (A4C): 2 52 cm  LV MOD Diam; Recent value; End Diastole (A4C): 1 8 cm  LV MOD Diam; Recent value; End Diastole (A4C): 2 63 cm  LV MOD Diam; Recent value; End Diastole (A4C): 3 2 cm  LV MOD Diam; Recent value; End Diastole (A4C): 3 62 cm  LV MOD Diam; Recent value; End Diastole (A4C): 4 15 cm  LV MOD Diam; Recent value; End Diastole (A4C): 4 39 cm  LV MOD Diam; Recent value; End Diastole (A4C): 4 58 cm  LV MOD Diam; Recent value; End Systole (A4C): 1 04 cm  LV MOD Diam; Recent value; End Systole (A4C): 1 48 cm  LV MOD Diam; Recent value; End Systole (A4C): 1 86 cm  LV MOD Diam; Recent value; End Systole (A4C): 2 21 cm  LV MOD Diam; Recent value;  End Systole (A4C): 2 52 cm  LV MOD Diam; Recent value; End Systole (A4C): 2 71 cm  LV MOD Diam; Recent value; End Systole (A4C): 2 9 cm  LV MOD Diam; Recent value; End Systole (A4C): 3 04 cm  LV MOD Diam; Recent value; End Systole (A4C): 3 14 cm  LV MOD Diam; Recent value; End Systole (A4C): 3 17 cm  LV MOD Diam; Recent value; End Systole (A4C): 3 25 cm  LV MOD Diam; Recent value; End Systole (A4C): 3 33 cm  LV MOD Diam; Recent value; End Systole (A4C): 3 47 cm  LV MOD Diam; Recent value; End Systole (A4C): 3 63 cm  LV MOD Diam; Recent value; End Systole (A4C): 3 82 cm  LV MOD Diam; Recent value; End Systole (A4C): 3 91 cm  LV MOD Diam; Recent value; End Systole (A4C): 3 94 cm  LV MOD Diam; Recent value; End Systole (A4C): 3 94 cm  LV MOD Diam; Recent value; End Systole (A4C): 2 05 cm  LV MOD Diam; Recent value; End Systole (A4C): 3 91 cm  LVEF MOD A4C: 61 4 %  Left Ventricle diastolic major axis; Most recent value chosen; Method of Disks, Single Plane; 2D mode; Apical four chamber;: 7 85 cm  Left Ventricle systolic major axis; Most recent value chosen; Method of Disks, Single Plane; 2D mode; Apical four chamber;: 6 26 cm  Left Ventricular Diastolic Area; Most recent value chosen; Method of Disks, Single Plane; 2D mode; Apical four chamber;: 3410 mm2  Left Ventricular End Diastolic Volume; Most recent value chosen; Method of Disks, Single Plane; 2D mode; Apical four chamber;: 121 cm3  Left Ventricular End Systolic Volume; Most recent value chosen; Method of Disks, Single Plane; 2D mode; Apical four chamber;: 46 7 cm3  Left Ventricular Systolic Area; Most recent value chosen; Method of Disks, Single Plane; 2D mode; Apical four chamber;: 1860 mm2  SI (A4C): 43 5 ml/m2  SV MOD A4C: 74 3 cm3  Right Atrium Systolic Area; End Systole; 2D mode; Apical four chamber;: 1560 mm2  Right Atrium Systolic Area; Mean; Mean value chosen; End Systole; 2D mode;  Apical four chamber;: 1560 mm2    M mode  Tricuspid Annular Plane Systolic Excursion; Mean; Mean value chosen; Tricuspid Annulus; M mode;: 2 09 cm  Tricuspid Annular Plane Systolic Excursion; Tricuspid Annulus; M mode;: 2 09 cm    Tissue Doppler Imaging  LV Peak Early Hurst Tissue Tam; Medial MA (TDI): 49 mm/s  Left Ventricular Peak Early Diastolic Tissue Velocity; Mean; Mean value chosen; Medial Mitral Annulus; Tissue Doppler Imaging;: 49 mm/s    Unspecified Scan Mode  MV Peak Tam/LV Peak Tissue Tam E-Wave; Medial MA: 12 4  DT; Antegrade Flow: 354 ms  DT; Mean; Antegrade Flow: 354 ms  Dec [place]; Antegrade Flow: 1710 mm/s2  Dec [place]; Mean; Antegrade Flow: 1710 mm/s2  MV A Tam: 839 mm/s  MV E Tam: 606 mm/s  MV E/A Ratio: 0 7  MV Peak A Tam: 839 mm/s  MV Peak E Tam; Mean; Antegrade Flow: 606 mm/s  MVA (PHT): 212 mm2  PHT: 104 ms  PHT; Mean: 104 ms  Peak Grad; Mean; Regurgitant Flow: 19 mm[Hg]  Vmax; Mean; Regurgitant Flow: 2200 mm/s  Vmax; Regurgitant Flow: 2100 mm/s  Vmax; Regurgitant Flow: 2290 mm/s    [facility]    Prepared and electronically signed by    Sonia Cueva MD  Signed 15-Sep-2019 14:32:00       No results found for this or any previous visit  CATH:  No results found for this or any previous visit  STRESS TEST:  No results found for this or any previous visit      VTE Prophylaxis: Sequential compression device (Venodyne)

## 2020-02-16 NOTE — SOCIAL WORK
Met with patient to disucss CM role and DCP and OBS status-notice signed  Pt lives alone in ranch home with 3STE  Laundry is in basement where she fell yesterday  She reports independent ADL's and ambulation pta  She has walker and cane at home but only uses as needed  She has had mult falls and recently started outpt therapy at [facility] [address] 3 days/week  She still drives  DME: cane, walker and grab bar at bedside and in bathroom  She has had SL VN in past  She has been to rehab at [facility] but states she would not go back there  PT is recommending inpt rehab 2ndary to pt having difficulty getting out of chair/bed and pt also w/ c/o dizziness after ambulation  Pt states there is no one who can live with her  This CM recommending hiring caregiver privately but she refused same  Pt states if she has to go to rehab [facility] would be first choice and [facility] second choice  OT eval pnd  No LW/POA  Denies h/o alcohol/drug and mental health treatment  PCP: Dr Hang Pierre is [facility] in [facility]  Primary contact is grand daughterGarima [phone]  Family or neighbor will transport upon d/c   CM reviewed d/c planning process including the following: identifying help at home, patient preference for d/c planning needs, Discharge Lounge, [facility] Meds to Bed program, availability of treatment team to discuss questions or concerns patient and/or family may have regarding understanding medications and recognizing signs and symptoms once discharged  CM also encouraged patient to follow up with all recommended appointments after discharge  Patient advised of importance for patient and family to participate in managing patients medical well being  Patient/caregiver received discharge checklist   Content reviewed  Patient/caregiver encouraged to participate in discharge plan of care prior to discharge home

## 2020-02-16 NOTE — ASSESSMENT & PLAN NOTE
· Had complained about approximately 5 minute episode of chest pain following fall  · EKG essentially unchanged from prior however troponin 0 07 on initial check  And increase noted up to 0 28  Known history of CAD     given on admission   · Possibly type 2 in setting of fall, however will admit as observation, trend 2 further troponin, serial EKGs, telemetry monitoring  · Non MI trop elevation per cardiology

## 2020-02-16 NOTE — ASSESSMENT & PLAN NOTE
Lab Results   Component Value Date    HGBA1C 6 9 (H) 02/16/2020       Recent Labs     02/16/20  0703 02/16/20  1104   POCGLU 101 190*       Blood Sugar Average: Last 72 hrs:  · (P) 145 5 hold metformin while inpatient  · Initiate SSI with Accu-Cheks, carb controlled diet  · Initiate hypoglycemia protocol

## 2020-02-16 NOTE — UTILIZATION REVIEW
Initial Clinical Review  Observation 2/15/20 @ 2110, converted to inpatient admission 1/26/20 @ 1231 for continued care & tx for chest pain  Admitting Physician Anna Cano    Level of Care Med Surg    Estimated length of stay More than 2 Midnights    Certification I certify that inpatient services are medically necessary for this patient for a duration of greater than two midnights  See H&P and MD Progress Notes for additional information about the patient's course of treatment            Order History   Inpatient   Date/Time Action Taken User Additional Information   02/16/20 [address] Elise Triplett (auto-released) From Order: [id number]   02/16/20 1231 Complete YUSUF Seth      ED Arrival Information     Expected Arrival Acuity Means of Arrival Escorted By Service Admission Type    - 2/15/2020 19:12 Emergent Ambulance [facility] [address] Complaint    Fall        Chief Complaint   Patient presents with    Fall     Level C      Assessment/Plan:   80 yof Patient was in her basement doing laundry, was reportedly walking up stairs when she lost her balance at the 3rd or 4th stair from the bottom of the staircase, causing her to fall and strike her frontal head as well as her bilateral forearms and her left knee  The patient denied loss of consciousness  The patient states that while she was on the floor she had some nausea but no vomiting  The patient had a transient episode of chest pain    Chest pain  Assessment & Plan  · Had complained about approximately 5 minute episode of chest pain following fall  · EKG essentially unchanged from prior however troponin 0 07 on initial check in with known history of CAD  · Possibly type 2 in setting of fall, however will admit as observation, trend 2 further troponin, serial EKGs, telemetry monitoring  · Will request Cardiology inputs  · Give ASA 325mg x1  Elevated troponin  Assessment & Plan  · Troponin 0 07 on presentation, may be in setting of fall however had complained of chest pain and additionally with cardiac history as below  Fall at home, initial encounter  Assessment & Plan  · Patient with mechanical fall at home as above, with multiple abrasions however trauma imaging negative for acute injury  · Apparently has had several near episodes in past and follows with outpatient PT/OT  · Will request inpatient PT/OT evaluation while here    ED Triage Vitals   Temperature Pulse Respirations Blood Pressure SpO2   02/15/20 1915 02/15/20 1915 02/15/20 1915 02/15/20 1915 02/15/20 1915   97 5 °F (36 4 °C) 70 16 155/100 99 %      Temp Source Heart Rate Source Patient Position - Orthostatic VS BP Location FiO2 (%)   02/15/20 1915 -- -- -- --   Oral          Pain Score       02/15/20 2149       5        Wt Readings from Last 1 Encounters:   02/15/20 66 7 kg (147 lb)     Additional Vital Signs:   Date/Time  Temp  Pulse  Resp  BP  MAP (mmHg)  SpO2   02/16/20 07:13:04  97 3 °F (36 3 °C)Abnormal   61  --  157/68  98  100 %   02/15/20 21:58:59  96 °F (35 6 °C)Abnormal   61  19  164/60  95  100 %   02/15/20 2000  --  94  16  152/98  --  100 %   Pertinent Labs/Diagnostic Test Results:   Results from last 7 days   Lab Units 02/16/20  0645 02/16/20  0044 02/15/20  1952   WBC Thousand/uL 7 90  --  9 44   HEMOGLOBIN g/dL 10 8*  --  12 1   HEMATOCRIT % 34 5*  --  38 7   PLATELETS Thousands/uL 216 237 254   NEUTROS ABS Thousands/µL  --   --  7 64*     Results from last 7 days   Lab Units 02/16/20  0645 02/15/20  1952   SODIUM mmol/L 140 139   POTASSIUM mmol/L 4 6 4 7   CHLORIDE mmol/L 109* 107   CO2 mmol/L 23 24   ANION GAP mmol/L 8 8   BUN mg/dL 33* 34*   CREATININE mg/dL 1 43* 1 58*   EGFR ml/min/1 73sq m 33 29   CALCIUM mg/dL 9 2 9 3     Results from last 7 days   Lab Units 02/15/20  1952   AST U/L 28   ALT U/L 17   ALK PHOS U/L 152*   TOTAL PROTEIN g/dL 7 7   ALBUMIN g/dL 3 6   TOTAL BILIRUBIN mg/dL 0 37 Results from last 7 days   Lab Units 02/16/20  0703   POC GLUCOSE mg/dl 101     Results from last 7 days   Lab Units 02/16/20  0645 02/15/20  1952   GLUCOSE RANDOM mg/dL 99 165*         Results from last 7 days   Lab Units 02/16/20  0645   HEMOGLOBIN A1C % 6 9*   EAG mg/dl 151     Results from last 7 days   Lab Units 02/16/20  0645 02/16/20  0351 02/16/20  0044 02/15/20  1952   TROPONIN I ng/mL 0 27* 0 28* 0 26* 0 07*     2/15  Ct head=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 and small bilateral basal ganglia lacunar infarctions  No acute intracranial abnormality  Ct cervical spine=Multilevel degenerative spondylosis  Developing DISH  No acute cervical spine fracture or traumatic malalignment  Ekg= Sinus bradycardia  Anterior infarct (cited on or before 13-SEP-2019)  T wave abnormality, consider lateral ischemia  ED Treatment:   Medication Administration from 02/15/2020 1912 to 02/15/2020 2135     None        Past Medical History:   Diagnosis Date    Acquired absence of kidney     Frequency of micturition     GERD (gastroesophageal reflux disease)     Hypertension     Malignant neoplasm of left kidney, except renal pelvis (HCC)     Nocturia     Personal history of other malignant neoplasm of kidney     Renal mass, left     UTI (urinary tract infection)      Present on Admission:   CAD (coronary artery disease)   CKD (chronic kidney disease)   Elevated troponin   Chest pain   Type 2 diabetes mellitus, without long-term current use of insulin ([facility] [address] )   Hypertension   Hyperlipidemia   Hypothyroidism  Admitting Diagnosis: Abrasion [T14  8XXA]  Elevated troponin [R79 89]  Fall, initial encounter [W19  XXXA]  Chest pain, unspecified type [R07 9]  Unspecified multiple injuries, initial encounter [T07  XXXA]  Age/Sex: 80 y o  female  Admission Orders:  accuchec ks with coverage scale  Pt/ot eval & tx  ekg with serial troponin  consult cardio  Incentive spirometry  Scd/foot pumps  Wound care  Scheduled Medications:  aspirin 81 mg Oral Daily   cyanocobalamin 1,000 mcg Oral Daily   docusate sodium 100 mg Oral BID   heparin (porcine) 5,000 Units Subcutaneous Q8H [address]   insulin lispro 1-5 Units Subcutaneous TID AC   insulin lispro 1-5 Units Subcutaneous HS   levothyroxine 75 mcg Oral Early Morning   magnesium chloride 64 mg Oral Daily   metoprolol succinate 25 mg Oral Daily   pantoprazole 40 mg Oral Daily Before Breakfast   pravastatin 80 mg Oral Daily With Dinner     PRN Meds:  acetaminophen 650 mg Oral Q6H PRN     Network Utilization Review Department  [email or web address]  ATTENTION: Please call with any questions or concerns to [phone] and carefully listen to the prompts so that you are directed to the right person  All voicemails are confidential   Vicki Tapia all requests for admission clinical reviews, approved or denied determinations and any other requests to dedicated fax number below belonging to the campus where the patient is receiving treatment   List of dedicated fax numbers for the Facilities:  [address] [place] [address] DENIALS (Administrative/Medical Necessity) [phone]   [address] [place] [address] (Maternity/NICU/Pediatrics) [phone]   [facility] [phone]     [address] [phone]   Alejandro Clamp [phone]   [place] [place] [phone]   [facility] [address] [phone]   [facility] [phone]   [address] [facility] [address]  [address] [facility] [address] [facility] [phone]

## 2020-02-16 NOTE — ED PROVIDER NOTES
History of Present Illness     Patient Identification  Jessica Rossi is a 80 y o  female  Patient information was obtained from patient  History/Exam limitations: none  Patient presented to the Emergency Department by ambulance where the patient received GCS at scene 15 prior to arrival     Chief Complaint   Fall (Level C )  [place] at home backwards down 3-4 stairs while carrying laundry up the stairs from her basement  Patient presents complaining of b/l arm pain and bleeding pain without radiation  Onset of symptoms was abrupt starting just pta   ago  Mechanism of injury was a fall  Loss of consciousness did not occur  Pain is described as sharp/stabbing  Severity of symptoms at onset was severe  Symptoms have been constant  Symptoms are aggravated by palpation, alleviated by not touching skin tears and are associated with a brief episode of CP just after the fall  Care prior to arrival consisted of local wound care  Patient complains only of left forearm pain   No nausea, vomiting, shortness of breath, abdominal pain, back pain, difficulty ambulating  She has walked since her fall  Takes 1 baby aspirin daily      Past Medical History:   Diagnosis Date    Acquired absence of kidney     Frequency of micturition     GERD (gastroesophageal reflux disease)     Hypertension     Malignant neoplasm of left kidney, except renal pelvis (HCC)     Nocturia     Personal history of other malignant neoplasm of kidney     Renal mass, left     UTI (urinary tract infection)      Family History   Problem Relation Age of Onset    Diabetes Father     Heart failure Brother      Scheduled Meds:  Continuous Infusions:   PRN Meds:    Allergies   Allergen Reactions    Ace Inhibitors      Other reaction(s): Hypotension    Cephalexin     Clindamycin     Doxycycline     Erythromycin Base     Lactate     Levaquin [Levofloxacin]     Penicillins     Procainamide     Quinidine (Non-Therapeutic)     Sulfa Antibiotics      Social History     Socioeconomic History    Marital status:      Spouse name: Not on file    Number of children: Not on file    Years of education: Not on file    Highest education level: Not on file   Occupational History    Not on file   Social Needs    Financial resource strain: Not on file    Food insecurity:     Worry: Not on file     Inability: Not on file    Transportation needs:     Medical: Not on file     Non-medical: Not on file   Tobacco Use    Smoking status: Never Smoker    Smokeless tobacco: Never Used   Substance and Sexual Activity    Alcohol use: Not Currently    Drug use: Not Currently    Sexual activity: Not on file   Lifestyle    Physical activity:     Days per week: Not on file     Minutes per session: Not on file    Stress: Not on file   Relationships    Social connections:     Talks on phone: Not on file     Gets together: Not on file     Attends Hinduism service: Not on file     Active member of club or organization: Not on file     Attends meetings of clubs or organizations: Not on file     Relationship status: Not on file    Intimate partner violence:     Fear of current or ex partner: Not on file     Emotionally abused: Not on file     Physically abused: Not on file     Forced sexual activity: Not on file   Other Topics Concern    Not on file   Social History Narrative    Not on file     Review of Systems  Pertinent items are noted in HPI       Physical Exam     /98   Pulse 94   Temp 97 5 °F (36 4 °C) (Oral)   Resp 16   Wt 66 7 kg (147 lb)   SpO2 100%   BMI 26 04 kg/m²     Oh Coma Score  Eye openin - Opens eyes on own   Verbal:  5 - Alert and oriented   Motor:  6 - Follows simple motor commands   GCS Total: 15     /98   Pulse 94   Temp 97 5 °F (36 4 °C) (Oral)   Resp 16   Wt 66 7 kg (147 lb)   SpO2 100%   BMI 26 04 kg/m²     General Appearance:    Alert, cooperative, no distress, appears stated age   Head: Normocephalic, without obvious abnormality, atraumatic   Eyes:    PERRL, conjunctiva/corneas clear, EOM's intact, fundi     benign, both eyes   Ears:    Normal TM's and external ear canals, both ears   Nose:   Nares normal, septum midline, mucosa normal, no drainage    or sinus tenderness   Throat:   Lips, mucosa, and tongue normal; teeth and gums normal   Neck:   Supple, symmetrical, trachea midline, no adenopathy;     thyroid:  no enlargement/tenderness/nodules; no carotid    bruit or JVD   Back:     Symmetric, no curvature, ROM normal, no CVA tenderness   Lungs:     Clear to auscultation bilaterally, respirations unlabored   Chest Wall:    No tenderness or deformity    Heart:    Regular rate and rhythm, S1 and S2 normal, no murmur, rub   or gallop   Breast Exam:    No tenderness, masses, or nipple abnormality   Abdomen:     Soft, non-tender, bowel sounds active all four quadrants,     no masses, no organomegaly   Genitalia:    Normal female without lesion, discharge or tenderness   Rectal:    Normal tone, no masses or tenderness; guaiac negative stool   Extremities:   Extremities normal, atraumatic, no cyanosis or edema   Pulses:   2+ and symmetric all extremities   Skin:   B/l forearm abrasions and left knee abrasion   Lymph nodes:   Cervical, supraclavicular, and axillary nodes normal   Neurologic:   CNII-XII intact, normal strength, sensation and reflexes     throughout       ED Course     Studies:     Records Reviewed: Old medical records  Treatments: None  Wound cleansed  Wound debrided  Wound dressing applied      Consultations:     Disposition: Home Tylenol, Advised to return for signs of head injury, weakness, numbness or tingling to extremities, incontinence and Advised to return for worsening or additional problems such as abdominal or chest painHistory  Chief Complaint   Patient presents with    Fall     Level C      See below      History provided by:  Patient and medical records  Language  used: No    Fall   Mechanism of injury: fall    Injury location: arms  Incident location: stairs  Time since incident:  1 hour  Arrived directly from scene: yes    Fall:     Fall occurred:  Down stairs  Prior to arrival data:     Patient ambulatory at scene: yes    Associated symptoms: chest pain    Associated symptoms: no vomiting        Prior to Admission Medications   Prescriptions Last Dose Informant Patient Reported? Taking?    Cyanocobalamin (B-12) 1000 MCG CAPS  Self Yes No   Sig: Take 1 tablet by mouth daily   GLIPIZIDE XL 5 MG 24 hr tablet   Yes No   Sig: Take 5 mg by mouth daily   aspirin 81 MG tablet  Self Yes No   Sig: Take 1 tablet by mouth daily   betamethasone valerate (VALISONE) 0 1 % ointment   Yes No   Sig: APPLY TO THE AFFECTED AREA SMALL AMOUNT 1 TO 2 TIMES A WEEK FOR IRRITATION/ITCHING   docusate sodium (COLACE) 100 mg capsule  Self Yes No   Sig: Take 1 capsule by mouth 2 (two) times a day   furosemide (LASIX) 20 mg tablet   No No   Sig: Take 1 tablet (20 mg total) by mouth daily as needed (lower extremity edema)   levothyroxine 75 mcg tablet  Self Yes No   Sig: Take 1 tablet by mouth daily   magnesium chloride (MAG64) 64 MG TBEC EC tablet   Yes No   Sig: Take 1 tablet by mouth   metFORMIN (GLUCOPHAGE-XR) 750 mg 24 hr tablet   Yes No   Sig: TAKE 1 TABLET (750 MG TOTAL) BY MOUTH DAILY WITH BREAKFAST    metoprolol succinate (TOPROL-XL) 25 mg 24 hr tablet  Self Yes No   Sig: Take 1 tablet by mouth daily   mupirocin (BACTROBAN) 2 % ointment   No No   Sig: Apply topically 3 (three) times a day for 10 days   omeprazole (PRILOSEC) 20 mg delayed release capsule  Self Yes No   Sig: Take 1 capsule by mouth daily   simvastatin (ZOCOR) 40 mg tablet  Self Yes No   Sig: Take 1 tablet by mouth daily      Facility-Administered Medications: None       Past Medical History:   Diagnosis Date    Acquired absence of kidney     Frequency of micturition     GERD (gastroesophageal reflux disease)     Hypertension     Malignant neoplasm of left kidney, except renal pelvis (HCC)     Nocturia     Personal history of other malignant neoplasm of kidney     Renal mass, left     UTI (urinary tract infection)        Past Surgical History:   Procedure Laterality Date    RADICAL NEPHRECTOMY Left 2013       Family History   Problem Relation Age of Onset    Diabetes Father     Heart failure Brother      I have reviewed and agree with the history as documented  Social History     Tobacco Use    Smoking status: Never Smoker    Smokeless tobacco: Never Used   Substance Use Topics    Alcohol use: Not Currently    Drug use: Not Currently        Review of Systems   Cardiovascular: Positive for chest pain  Gastrointestinal: Negative for vomiting  Physical Exam  ED Triage Vitals [02/15/20 1915]   Temperature Pulse Respirations Blood Pressure SpO2   97 5 °F (36 4 °C) 70 16 155/100 99 %      Temp Source Heart Rate Source Patient Position - Orthostatic VS BP Location FiO2 (%)   Oral -- -- -- --      Pain Score       --             Orthostatic Vital Signs  Vitals:    02/15/20 1915 02/15/20 1930 02/15/20 1945 02/15/20 2000   BP: 155/100 155/100 145/98 152/98   Pulse: 70 88 77 94       Physical Exam   Constitutional: She appears well-developed and well-nourished  No distress  HENT:   Head: Normocephalic and atraumatic  Mouth/Throat: Oropharynx is clear and moist    See below   Eyes: Conjunctivae are normal  No scleral icterus  Neck: Neck supple  No JVD present  No tracheal deviation present  Cardiovascular: Normal rate and regular rhythm  Pulmonary/Chest: Effort normal and breath sounds normal  No respiratory distress  Abdominal: Soft  There is no tenderness  There is no guarding  Musculoskeletal: She exhibits no edema or deformity  Neurological: She is alert  Moves all extremities   Skin: Skin is warm and dry  No rash noted  She is not diaphoretic     See below   Psychiatric: She has a normal mood and affect  Her behavior is normal    Nursing note and vitals reviewed        ED Medications  Medications   neomycin-bacitracin-polymyxin b (NEOSPORIN) ointment 1 small application (has no administration in time range)       Diagnostic Studies  Results Reviewed     Procedure Component Value Units Date/Time    Troponin I [[id number]]  (Abnormal) Collected:  02/15/20 1952    Lab Status:  Final result Specimen:  Blood from Arm, Right Updated:  02/15/20 2028     Troponin I 0 07 ng/mL     Comprehensive metabolic panel [[id number]]  (Abnormal) Collected:  02/15/20 1952    Lab Status:  Final result Specimen:  Blood from Arm, Right Updated:  02/15/20 2026     Sodium 139 mmol/L      Potassium 4 7 mmol/L      Chloride 107 mmol/L      CO2 24 mmol/L      ANION GAP 8 mmol/L      BUN 34 mg/dL      Creatinine 1 58 mg/dL      Glucose 165 mg/dL      Calcium 9 3 mg/dL      AST 28 U/L      ALT 17 U/L      Alkaline Phosphatase 152 U/L      Total Protein 7 7 g/dL      Albumin 3 6 g/dL      Total Bilirubin 0 37 mg/dL      eGFR 29 ml/min/1 73sq m     Narrative:       [place] guidelines for Chronic Kidney Disease (CKD):     Stage 1 with normal or high GFR (GFR > 90 mL/min/1 73 square meters)    Stage 2 Mild CKD (GFR = 60-89 mL/min/1 73 square meters)    Stage 3A Moderate CKD (GFR = 45-59 mL/min/1 73 square meters)    Stage 3B Moderate CKD (GFR = 30-44 mL/min/1 73 square meters)    Stage 4 Severe CKD (GFR = 15-29 mL/min/1 73 square meters)    Stage 5 End Stage CKD (GFR <15 mL/min/1 73 square meters)  Note: GFR calculation is accurate only with a steady state creatinine    CBC and differential [[id number]]  (Abnormal) Collected:  02/15/20 1952    Lab Status:  Final result Specimen:  Blood from Arm, Right Updated:  02/15/20 2000     WBC 9 44 Thousand/uL      RBC 4 00 Million/uL      Hemoglobin 12 1 g/dL      Hematocrit 38 7 %      MCV 97 fL      MCH 30 3 pg      MCHC 31 3 g/dL      RDW 13 1 %      MPV 10 3 fL Platelets 238 Thousands/uL      nRBC 0 /100 WBCs      Neutrophils Relative 82 %      Immat GRANS % 0 %      Lymphocytes Relative 10 %      Monocytes Relative 7 %      Eosinophils Relative 1 %      Basophils Relative 0 %      Neutrophils Absolute 7 64 Thousands/µL      Immature Grans Absolute 0 03 Thousand/uL      Lymphocytes Absolute 0 93 Thousands/µL      Monocytes Absolute 0 70 Thousand/µL      Eosinophils Absolute 0 10 Thousand/µL      Basophils Absolute 0 04 Thousands/µL                  XR chest 2 views   ED Interpretation by Lavon Holter, DO (02/15 2008)   No effusions, focal consolidations, ptx, free air      CT head without contrast   Final Result by Alissa Hunter MD (02/15 1951)   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 and small bilateral basal ganglia lacunar infarctions      No acute intracranial abnormality  Findings personally discussed by phone with Dr María Marcelino at 7:50 PM, 2/15/2020            Workstation performed: [id number]         CT cervical spine without contrast   Final Result by Alissa Hunter MD (02/15 1952)   Multilevel degenerative spondylosis  Developing DISH      No acute cervical spine fracture or traumatic malalignment  Findings personally discussed by phone with Dr María Marcelino at 7:50 PM, 2/15/2020                Workstation performed: [id number]               Procedures  Procedures      ED Course           Identification of Seniors at Risk      Most Recent Value   (ISAR) Identification of Seniors at Risk   Before the illness or injury that brought you to the Emergency, did you need someone to help you on a regular basis? 0 Filed at: 02/15/2020 2000   In the last 24 hours, have you needed more help than usual?  0 Filed at: 02/15/2020 2000   Have you been hospitalized for one or more nights during the past 6 months?   0 Filed at: 02/15/2020 2000   In general, do you see well?  0 Filed at: 02/15/2020 2000   In general, do you have serious problems with your memory? 0 Filed at: 02/15/2020 2000   Do you take more than three different medications every day?  0 Filed at: 02/15/2020 2000   ISAR Score  0 Filed at: 02/15/2020 2000                          [facility]  Number of Diagnoses or Management Options  Abrasion: new and does not require workup  Fall, initial encounter: new and requires workup     Amount and/or Complexity of Data Reviewed  Clinical lab tests: ordered and reviewed  Tests in the radiology section of CPT®: ordered and reviewed  Tests in the medicine section of CPT®: ordered and reviewed  Review and summarize past medical records: yes  Discuss the patient with other providers: yes    Risk of Complications, Morbidity, and/or Mortality  Presenting problems: moderate  Diagnostic procedures: moderate  Management options: moderate    Patient Progress  Patient progress: stable        Disposition  Final diagnoses:   Fall, initial encounter   Abrasion     Time reflects when diagnosis was documented in both MDM as applicable and the Disposition within this note     Time User Action Codes Description Comment    2/15/2020  8:09 PM Deandre Gatica Add [W19  ZIZO] Fall, initial encounter     2/15/2020  8:09 PM Deandre Gatica Add [T14  8XXA] Abrasion     2/15/2020  9:14 PM Yonny MATHIAS Add [R07 9] Chest pain, unspecified type     2/15/2020  9:14 PM Yonny MATHIAS Add [R79 89] Elevated troponin       ED Disposition     ED Disposition Condition Date/Time Comment    Admit Stable Sat Feb 15, 2020  9:01 PM Discussed with Dr Adrian Booth  Follow-up Information     Follow up With Specialties Details Why Contact Info    Bayron Wang MD Internal Medicine Call in 1 day To recheck symptoms and follow up on your ER visit [id number] [facility]  Attila Jacob Mckeon  [facility] [address]  [phone]            Patient's Medications   Discharge Prescriptions    No medications on file     No discharge procedures on file      ED Provider  Attending physically available and evaluated [facility]  I managed the patient along with the ED Attending      Electronically Signed by         Blanka Carreon DO  02/15/20 5089

## 2020-02-16 NOTE — ED ATTENDING ATTESTATION
2/15/2020  I, Giles Diaz MD, saw and evaluated the patient  I have discussed the patient with the resident/non-physician practitioner and agree with the resident's/non-physician practitioner's findings, Plan of Care, and MDM as documented in the resident's/non-physician practitioner's note, except where noted  All available labs and Radiology studies were reviewed  I was present for key portions of any procedure(s) performed by the resident/non-physician practitioner and I was immediately available to provide assistance  At this point I agree with the current assessment done in the Emergency Department  I have conducted an independent evaluation of this patient a history and physical is as follows:    ED Course     Patient seen on arrival with the resident has a trauma level C  Fall down stairs  Negative LOC  Positive head injury  Skin tears bilateral forearms and left knee  Patient had transient episode of chest pain after the event  Cardiac evaluation      Labs Reviewed   CBC AND DIFFERENTIAL - Abnormal       Result Value Ref Range Status    WBC 9 44  4 31 - 10 16 Thousand/uL Final    RBC 4 00  3 81 - 5 12 Million/uL Final    Hemoglobin 12 1  11 5 - 15 4 g/dL Final    Hematocrit 38 7  34 8 - 46 1 % Final    MCV 97  82 - 98 fL Final    MCH 30 3  26 8 - 34 3 pg Final    MCHC 31 3 (*) 31 4 - 37 4 g/dL Final    RDW 13 1  11 6 - 15 1 % Final    MPV 10 3  8 9 - 12 7 fL Final    Platelets 674  620 - 390 Thousands/uL Final    nRBC 0  /100 WBCs Final    Neutrophils Relative 82 (*) 43 - 75 % Final    Immat GRANS % 0  0 - 2 % Final    Lymphocytes Relative 10 (*) 14 - 44 % Final    Monocytes Relative 7  4 - 12 % Final    Eosinophils Relative 1  0 - 6 % Final    Basophils Relative 0  0 - 1 % Final    Neutrophils Absolute 7 64 (*) 1 85 - 7 62 Thousands/µL Final    Immature Grans Absolute 0 03  0 00 - 0 20 Thousand/uL Final    Lymphocytes Absolute 0 93  0 60 - 4 47 Thousands/µL Final    Monocytes Absolute 0  70  0 17 - 1 22 Thousand/µL Final    Eosinophils Absolute 0 10  0 00 - 0 61 Thousand/µL Final    Basophils Absolute 0 04  0 00 - 0 10 Thousands/µL Final   COMPREHENSIVE METABOLIC PANEL - Abnormal    Sodium 139  136 - 145 mmol/L Final    Potassium 4 7  3 5 - 5 3 mmol/L Final    Comment: Slightly Hemolyzed; Results May be Affected    Chloride 107  100 - 108 mmol/L Final    CO2 24  21 - 32 mmol/L Final    ANION GAP 8  4 - 13 mmol/L Final    BUN 34 (*) 5 - 25 mg/dL Final    Creatinine 1 58 (*) 0 60 - 1 30 mg/dL Final    Comment: Standardized to IDMS reference method    Glucose 165 (*) 65 - 140 mg/dL Final    Comment:   If the patient is fasting, the ADA then defines impaired fasting glucose as > 100 mg/dL and diabetes as > or equal to 123 mg/dL  Specimen collection should occur prior to Sulfasalazine administration due to the potential for falsely depressed results  Specimen collection should occur prior to Sulfapyridine administration due to the potential for falsely elevated results  Calcium 9 3  8 3 - 10 1 mg/dL Final    AST 28  5 - 45 U/L Final    Comment: Slightly Hemolyzed; Results May be Affected  Specimen collection should occur prior to Sulfasalazine administration due to the potential for falsely depressed results  ALT 17  12 - 78 U/L Final    Comment:   Specimen collection should occur prior to Sulfasalazine and/or Sulfapyridine administration due to the potential for falsely depressed results       Alkaline Phosphatase 152 (*) 46 - 116 U/L Final    Total Protein 7 7  6 4 - 8 2 g/dL Final    Albumin 3 6  3 5 - 5 0 g/dL Final    Total Bilirubin 0 37  0 20 - 1 00 mg/dL Final    eGFR 29  ml/min/1 73sq m Final    Narrative:     [place] guidelines for Chronic Kidney Disease (CKD):     Stage 1 with normal or high GFR (GFR > 90 mL/min/1 73 square meters)    Stage 2 Mild CKD (GFR = 60-89 mL/min/1 73 square meters)    Stage 3A Moderate CKD (GFR = 45-59 mL/min/1 73 square meters)    Stage 3B Moderate CKD (GFR = 30-44 mL/min/1 73 square meters)    Stage 4 Severe CKD (GFR = 15-29 mL/min/1 73 square meters)    Stage 5 End Stage CKD (GFR <15 mL/min/1 73 square meters)  Note: GFR calculation is accurate only with a steady state creatinine   TROPONIN I - Abnormal    Troponin I 0 07 (*) <=0 04 ng/mL Final    Comment:   [employer] Chemistry analyzer 99% cutoff is > 0 04 ng/mL in network labs     o cTnI 99% cutoff is useful only when applied to patients in the clinical setting of myocardial ischemia   o cTnI 99% cutoff should be interpreted in the context of clinical history, ECG findings and possibly cardiac imaging to establish correct diagnosis  o cTnI 99% cutoff may be suggestive but clearly not indicative of a coronary event without the clinical setting of myocardial ischemia  Results indicate test should be repeated on new specimen collected within 4-6 hours of the original       XR chest 2 views   ED Interpretation   No effusions, focal consolidations, ptx, free air      CT head without contrast   Final Result   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 and small bilateral basal ganglia lacunar infarctions      No acute intracranial abnormality  Findings personally discussed by phone with Dr Marnie Matt at 7:50 PM, 2/15/2020            Workstation performed: [id number]         CT cervical spine without contrast   Final Result   Multilevel degenerative spondylosis  Developing DISH      No acute cervical spine fracture or traumatic malalignment        Findings personally discussed by phone with Dr Marnie Matt at 7:50 PM, 2/15/2020                Workstation performed: [id number] [facility]  Procedures

## 2020-02-16 NOTE — ASSESSMENT & PLAN NOTE
Lab Results   Component Value Date    HGBA1C 7 2 (H) 09/15/2019       No results for input(s): POCGLU in the last 72 hours      Blood Sugar Average: Last 72 hrs:  ·  hold metformin while inpatient  · Initiate SSI with Accu-Cheks, carb controlled diet  · Initiate hypoglycemia protocol

## 2020-02-16 NOTE — SOCIAL WORK
Referrals mad to [address] [place] [address] and [facility] for [employer]

## 2020-02-17 ENCOUNTER — APPOINTMENT (OUTPATIENT)
Dept: PHYSICAL THERAPY | Facility: CLINIC | Age: 85
End: 2020-02-17
Payer: MEDICARE

## 2020-02-17 LAB
ATRIAL RATE: 131 BPM
GLUCOSE SERPL-MCNC: 123 MG/DL (ref 65–140)
GLUCOSE SERPL-MCNC: 146 MG/DL (ref 65–140)
GLUCOSE SERPL-MCNC: 188 MG/DL (ref 65–140)
GLUCOSE SERPL-MCNC: 92 MG/DL (ref 65–140)
QRS AXIS: 35 DEGREES
QRSD INTERVAL: 118 MS
QT INTERVAL: 424 MS
QTC INTERVAL: 460 MS
T WAVE AXIS: 151 DEGREES
VENTRICULAR RATE: 71 BPM

## 2020-02-17 PROCEDURE — 97116 GAIT TRAINING THERAPY: CPT

## 2020-02-17 PROCEDURE — 93010 ELECTROCARDIOGRAM REPORT: CPT | Performed by: INTERNAL MEDICINE

## 2020-02-17 PROCEDURE — 99232 SBSQ HOSP IP/OBS MODERATE 35: CPT | Performed by: INTERNAL MEDICINE

## 2020-02-17 PROCEDURE — 97167 OT EVAL HIGH COMPLEX 60 MIN: CPT

## 2020-02-17 PROCEDURE — 97530 THERAPEUTIC ACTIVITIES: CPT

## 2020-02-17 PROCEDURE — 82948 REAGENT STRIP/BLOOD GLUCOSE: CPT

## 2020-02-17 RX ADMIN — INSULIN LISPRO 1 UNITS: 100 INJECTION, SOLUTION INTRAVENOUS; SUBCUTANEOUS at 11:50

## 2020-02-17 RX ADMIN — MAGNESIUM 64 MG (MAGNESIUM CHLORIDE) TABLET,DELAYED RELEASE 64 MG: at 10:11

## 2020-02-17 RX ADMIN — PRAVASTATIN SODIUM 80 MG: 80 TABLET ORAL at 18:23

## 2020-02-17 RX ADMIN — HEPARIN SODIUM 5000 UNITS: 5000 INJECTION INTRAVENOUS; SUBCUTANEOUS at 21:38

## 2020-02-17 RX ADMIN — HEPARIN SODIUM 5000 UNITS: 5000 INJECTION INTRAVENOUS; SUBCUTANEOUS at 13:07

## 2020-02-17 RX ADMIN — METOPROLOL SUCCINATE 25 MG: 25 TABLET, EXTENDED RELEASE ORAL at 10:09

## 2020-02-17 RX ADMIN — ACETAMINOPHEN 650 MG: 325 TABLET ORAL at 20:34

## 2020-02-17 RX ADMIN — LEVOTHYROXINE SODIUM 75 MCG: 75 TABLET ORAL at 06:09

## 2020-02-17 RX ADMIN — DOCUSATE SODIUM 100 MG: 100 CAPSULE, LIQUID FILLED ORAL at 18:23

## 2020-02-17 RX ADMIN — PANTOPRAZOLE SODIUM 40 MG: 40 TABLET, DELAYED RELEASE ORAL at 10:08

## 2020-02-17 RX ADMIN — CYANOCOBALAMIN TAB 500 MCG 1000 MCG: 500 TAB at 10:08

## 2020-02-17 RX ADMIN — ACETAMINOPHEN 650 MG: 325 TABLET ORAL at 00:30

## 2020-02-17 RX ADMIN — ASPIRIN 81 MG 81 MG: 81 TABLET ORAL at 10:06

## 2020-02-17 RX ADMIN — HEPARIN SODIUM 5000 UNITS: 5000 INJECTION INTRAVENOUS; SUBCUTANEOUS at 06:09

## 2020-02-17 NOTE — ASSESSMENT & PLAN NOTE
· Patient with mechanical fall at home as above, with multiple abrasions however trauma imaging negative for acute injury  · Multiple skin tears BL forearms, left elbow and left knee  Dressings in place   · Apparently has had several near episodes in past and follows with outpatient PT/OT  · PT/OT recommending rehab  Referrals made   Pt also requesting [facility] referral, will make CM aware though unlikely candidate

## 2020-02-17 NOTE — RESTORATIVE TECHNICIAN NOTE
Restorative Specialist Mobility Note     Patient Active Problem List   Diagnosis    Cholelithiasis    Near syncope    Bradyarrhythmia    Hypothyroidism    CKD (chronic kidney disease)    Hyperlipidemia    Hypertension    Elevated troponin    CAD (coronary artery disease)    Type 2 diabetes mellitus, without long-term current use of insulin ([facility] )    Weight loss    Fall at home, initial encounter    Chest pain       Activity: Ambulate in marroquin, Ambulate in room, Bathroom privileges, Chair, Dangle, Stand at bedside(Educated/encouraged pt to ambulate with assistance 3-4 x's/day  Chair alarm on   Pt callbell, phone/tray within reach )     Assistive Device: Front wheel walker       [employer] BS, Restorative Technician, [employer]

## 2020-02-17 NOTE — PROGRESS NOTES
Progress Note - Boramacarenane Locke 1/3/1932, 80 y o  female MRN: [id number]    Unit/Bed#: [facility] 701-01 Encounter: [id number]    Primary Care Provider: Crow Spear MD   Date and time admitted to hospital: 2/15/2020  7:12 PM        * Fall at home, initial encounter  [facility]  · Patient with mechanical fall at home as above, with multiple abrasions however trauma imaging negative for acute injury  · Multiple skin tears BL forearms, left elbow and left knee  Dressings in place   · Apparently has had several near episodes in past and follows with outpatient PT/OT  · PT/OT recommending rehab  Referrals made  Pt also requesting [facility] referral, will make CM aware though unlikely candidate     Chest pain  Assessment & Plan  · Had complained about approximately 5 minute episode of chest pain following fall, likely musculoskeletal  · EKG essentially unchanged from prior however troponin 0 07 on initial check  And increase noted up to 0 28  Known history of CAD   given on admission   · Non MI trop elevation per cardiology and no further testing indicated  · Currently CP free     Elevated troponin  Assessment & Plan  · Troponin 0 07 on presentation, may be in setting of fall however had complained of chest pain and additionally with cardiac history as below    0 26, 0 28 followed by 0 27   · Cardiology consulted- non MI trop elevation likely 2/2 chronic renal disease  · No further testing indicated from their standpoint     Type 2 diabetes mellitus, without long-term current use of insulin [facility])  Assessment & Plan  Lab Results   Component Value Date    HGBA1C 6 9 (H) 02/16/2020       Recent Labs     02/16/20  1104 02/16/20  1613 02/16/20  2108 02/17/20  0630   POCGLU 190* 162* 169* 92       Blood Sugar Average: Last 72 hrs:  · (P) 142 8 hold metformin while inpatient, A1C well controlled  · Cont SSI with Accu-Cheks, carb controlled diet  · Initiate hypoglycemia protocol    CAD (coronary artery disease)  Assessment & Plan  · S/p stenting x1 to RCA approximately 2016  · Continue ASA, beta blocker, statin    Hypertension  Assessment & Plan  · Blood pressure above goal on admission, possibly in setting of fall, has now improved   · Otherwise continue home antihypertensives with hold parameters and monitor blood pressure per protocol    Hyperlipidemia  Assessment & Plan  · Continue statin therapy    CKD (chronic kidney disease)  Assessment & Plan  · Creat within baseline of 1 3-1 6  · Monitor PRN     Hypothyroidism  Assessment & Plan  · Continue Synthroid      VTE Pharmacologic Prophylaxis:   Pharmacologic: Heparin  Mechanical VTE Prophylaxis in Place: Yes    Patient Centered Rounds: I have performed bedside rounds with nursing staff today  Discussions with Specialists or Other Care Team Provider: LESTER    Education and Discussions with Family / Patient: patient  Time Spent for Care: 30 minutes  More than 50% of total time spent on counseling and coordination of care as described above  Current Length of Stay: 1 day(s)    Current Patient Status: Inpatient   Certification Statement: The patient will continue to require additional inpatient hospital stay due to medically stable, awaiting rehab placement  Discharge Plan:  Medically stable, awaiting rehab placement    Code Status: Level 3 - DNAR and DNI      Subjective:   Patient reports feeling well today  She states she slept very well overnight  Denies any dizziness, lightheadedness, chest pain, shortness of breath, palpitations  Denies any pain injury sites  Interested in a referral to our acute rehab as she has heard from a family member that the rehab is quite good       Objective:     Vitals:   Temp (24hrs), Av 1 °F (36 2 °C), Min:96 2 °F (35 7 °C), Max:97 7 °F (36 5 °C)    Temp:  [96 2 °F (35 7 °C)-97 7 °F (36 5 °C)] 96 9 °F (36 1 °C)  HR:  [52-63] 57  Resp:  [16-20] 18  BP: ()/() 144/105  SpO2:  [99 %-100 %] 100 %  Body mass index is 26 04 kg/m²  Input and Output Summary (last 24 hours): Intake/Output Summary (Last 24 hours) at 2/17/2020 0819  Last data filed at 2/16/2020 1825  Gross per 24 hour   Intake 840 ml   Output 200 ml   Net 640 ml       Physical Exam:     Physical Exam   Constitutional: She is oriented to person, place, and time  No distress  Cardiovascular: Normal rate and regular rhythm  Pulmonary/Chest: No respiratory distress  She exhibits no tenderness  Abdominal: Soft  Bowel sounds are normal    Musculoskeletal: She exhibits no edema  Neurological: She is alert and oriented to person, place, and time  Skin:   Mild abrasion to left forehead region, mild left knee abrasion   Psychiatric: She has a normal mood and affect  Nursing note and vitals reviewed  Additional Data:     Labs:    Results from last 7 days   Lab Units 02/16/20  0645  02/15/20  1952   WBC Thousand/uL 7 90  --  9 44   HEMOGLOBIN g/dL 10 8*  --  12 1   HEMATOCRIT % 34 5*  --  38 7   PLATELETS Thousands/uL 216   < > 254   NEUTROS PCT %  --   --  82*   LYMPHS PCT %  --   --  10*   MONOS PCT %  --   --  7   EOS PCT %  --   --  1    < > = values in this interval not displayed  Results from last 7 days   Lab Units 02/16/20  0645 02/15/20  1952   POTASSIUM mmol/L 4 6 4 7   CHLORIDE mmol/L 109* 107   CO2 mmol/L 23 24   BUN mg/dL 33* 34*   CREATININE mg/dL 1 43* 1 58*   CALCIUM mg/dL 9 2 9 3   ALK PHOS U/L  --  152*   ALT U/L  --  17   AST U/L  --  28           * I Have Reviewed All Lab Data Listed Above  * Additional Pertinent Lab Tests Reviewed: No New Labs Available For Today    Imaging:    Imaging Reports Reviewed Today Include:   All  Imaging Personally Reviewed by Myself Includes:  None    Recent Cultures (last 7 days):           Last 24 Hours Medication List:     Current Facility-Administered Medications:  acetaminophen 650 mg Oral Q6H PRN Karine Alu, DO   aspirin 81 mg Oral Daily Karine Alu, DO   cyanocobalamin 1,000 mcg Oral Daily Jetty Jubilee, DO   docusate sodium 100 mg Oral BID Jetty Jubilee, DO   heparin (porcine) 5,000 Units Subcutaneous [facility] Jetty Jubilee, DO   insulin lispro 1-5 Units Subcutaneous TID AC Jetty Jubilee, DO   insulin lispro 1-5 Units Subcutaneous HS Jetty Jubilee, DO   levothyroxine 75 mcg Oral Early Morning Jetty Jubilee, DO   magnesium chloride 64 mg Oral Daily Jetty Jubilee, DO   metoprolol succinate 25 mg Oral Daily Jetty Jubilee, DO   pantoprazole 40 mg Oral Daily Before Breakfast Jetty Jubilee, DO   pravastatin 80 mg Oral Daily With Wendy Lima DO        Today, Patient Was Seen By: Chelsey Mayer PA-C    ** Please Note: Dictation voice to text software may have been used in the creation of this document   **

## 2020-02-17 NOTE — ASSESSMENT & PLAN NOTE
· Troponin 0 07 on presentation, may be in setting of fall however had complained of chest pain and additionally with cardiac history as below    0 26, 0 28 followed by 0 27   · Cardiology consulted- non MI trop elevation likely 2/2 chronic renal disease  · No further testing indicated from their standpoint

## 2020-02-17 NOTE — PLAN OF CARE
Problem: OCCUPATIONAL THERAPY ADULT  Goal: Performs self-care activities at highest level of function for planned discharge setting  See evaluation for individualized goals  Description  Treatment Interventions: ADL retraining, Functional transfer training, Endurance training, Cognitive reorientation, Patient/family training, Equipment evaluation/education, Compensatory technique education, Continued evaluation, Activityengagement  Equipment Recommended: Tub seat with back       See flowsheet documentation for full assessment, interventions and recommendations  Note:   Limitation: Decreased ADL status, Decreased Safe judgement during ADL, Decreased cognition, Decreased endurance, Decreased self-care trans, Decreased high-level ADLs  Prognosis: Fair  Assessment: Pt is an 80year old female seen for initial OT evaluation s/p admission to [facility] 2/16/20 s/p mechanical fall on steps with laundry basket resulting in multiple abrasions with trauma imaging negative for acute injury  Pt reports (-) head strike  Additional active problems include: chest pain, elevated troponin, DM, CAD, HTN, HLD, CKD, and hypothyroidism  Pt with active OT orders and cleared by RN for OT evaluation and OOB mobility  Pt resides ALONE in a [facility] with 3STE  Pt reports being I with ADLs, IADLs, and functional mobility with use of rw for long distances  PT IS A  AND MANAGES HER MEDICATIONS  Pt reports that she has a neighbor that occasionally checks on her, but no one that can provide assist at home  Pt is currently demonstrating the following occupational deficits: eating with set-up, grooming with Dalia, UB bathing with Dalia, LB bathing with modA, UB dressing with modA, LB dressing with modA, toileting with Dalia, functional transfers with Dalia, and functional mobility with Dalia    Factors currently limiting pt's independence in these areas include: cognitive deficits, generalized weakness, pain, balance, functional mobility, endurance, and unsupportive home environment (ALONE)  From an OT standpoint, recommend STR upon d/c  Pt is to continue to benefit from skilled occupational therapy services while in the hospital to maximize functioning and independence with daily activities  See below for OT goals to be addressed 3-5x/wk       OT Discharge Recommendation: Short Term Rehab(and 24/7 supervision)  OT - OK to Discharge: Yes(to STR when medically stable)

## 2020-02-17 NOTE — ASSESSMENT & PLAN NOTE
Lab Results   Component Value Date    HGBA1C 6 9 (H) 02/16/2020       Recent Labs     02/16/20  1104 02/16/20  1613 02/16/20  2108 02/17/20  0630   POCGLU 190* 162* 169* 92       Blood Sugar Average: Last 72 hrs:  · (P) 142 8 hold metformin while inpatient, A1C well controlled  · Cont SSI with Accu-Cheks, carb controlled diet  · Initiate hypoglycemia protocol

## 2020-02-17 NOTE — PHYSICAL THERAPY NOTE
Physical Therapy Treatment Note       02/17/20 5895   Pain Assessment   Pain Assessment 0-10   Pain Score No Pain   Restrictions/Precautions   Weight Bearing Precautions Per Order No   Other Precautions Cognitive; Bed Alarm; Chair Alarm;Multiple lines; Fall Risk;Pain   General   Chart Reviewed Yes   Family/Caregiver Present No   Cognition   Overall Cognitive Status Impaired   Arousal/Participation Arousable;Responsive   Attention Attends with cues to redirect   Orientation Level Oriented X4   Memory Unable to assess   Following Commands Follows one step commands without difficulty   Subjective   Subjective states she feels OK   needs to use restroom   Transfers   Sit to Stand 4  Minimal assistance   Additional items Assist x 1;Verbal cues  (needs repeated cues for hand placement w/ all transfers)   Stand to Sit 4  Minimal assistance   Additional items Assist x 1; Increased time required;Verbal cues   Toilet transfer 3  Moderate assistance   Additional items Assist x 1; Increased time required;Verbal cues   Ambulation/Elevation   Gait pattern   (slow, atatxia)   Gait Assistance 4  Minimal assist   Additional items Assist x 1   Assistive Device Rolling walker   Distance 10'x2 to anf from BR, followed by 110'x2 w/ standing rest x 2-3 min   time spent p gait to reposition   Balance   Static Sitting Good   Dynamic Sitting Fair   Static Standing Fair -   Dynamic Standing Fair -   Ambulatory Poor +   Endurance Deficit   Endurance Deficit Yes   Endurance Deficit Description weakness, fatigue, cog   Activity Tolerance   Activity Tolerance Patient limited by fatigue;Treatment limited secondary to medical complications (Comment)   Nurse Made Aware yes   Assessment   Prognosis Good   Problem List Decreased strength; Impaired balance;Decreased mobility; Decreased coordination;Decreased endurance; Impaired judgement;Decreased safety awareness   Assessment Pt seen for session for setup, transfers, gait to and from BR w/ several min of S for use, gait w/ rest time, repositioning  Pt cooperative, willing to mobilize  stil some confusion  able to ambulate an increased dstance, but w/ poor seafety awareness throughout  continue to recommend rehab at d/c   Goals   Patient Goals to go home   STG Expiration Date 03/01/20   PT Treatment Day 1   Plan   Treatment/Interventions Functional transfer training;LE strengthening/ROM; Therapeutic exercise; Endurance training;Patient/family training;Gait training;Bed mobility; Equipment eval/education   Progress Progressing toward goals   PT Frequency Other (Comment)  (3-5x/wk)   Recommendation   Recommendation Post acute IP rehab   Equipment Recommended Walker   PT - OK to Discharge Yes  (when stable to rehab)   Sachin Velazquez PT, DPT CSRS

## 2020-02-17 NOTE — ASSESSMENT & PLAN NOTE
· Blood pressure above goal on admission, possibly in setting of fall, has now improved   · Otherwise continue home antihypertensives with hold parameters and monitor blood pressure per protocol

## 2020-02-17 NOTE — SOCIAL WORK
CM was informed by Adolfo Lyon PA-C that pt is requesting a referral to [facility]  Met with pt to discuss same  Pt is requesting a referral to [facility] referral sent  Pt states preference is 1  [facility] and 2  [employer]

## 2020-02-17 NOTE — ASSESSMENT & PLAN NOTE
· Had complained about approximately 5 minute episode of chest pain following fall, likely musculoskeletal  · EKG essentially unchanged from prior however troponin 0 07 on initial check  And increase noted up to 0 28  Known history of CAD     given on admission   · Non MI trop elevation per cardiology and no further testing indicated  · Currently CP free

## 2020-02-17 NOTE — PLAN OF CARE
Problem: PHYSICAL THERAPY ADULT  Goal: Performs mobility at highest level of function for planned discharge setting  See evaluation for individualized goals  Description  Treatment/Interventions: Functional transfer training, LE strengthening/ROM, Therapeutic exercise, Elevations, Endurance training, Patient/family training, Gait training, Bed mobility  Equipment Recommended: Walker       See flowsheet documentation for full assessment, interventions and recommendations  Note:   Prognosis: Good  Problem List: Decreased strength, Impaired balance, Decreased mobility, Decreased coordination, Decreased endurance, Impaired judgement, Decreased safety awareness  Assessment: Pt seen for session for setup, transfers, gait to and from BR w/ several min of S for use, gait w/ rest time, repositioning  Pt cooperative, willing to mobilize  stil some confusion  able to ambulate an increased dstance, but w/ poor seafety awareness throughout  continue to recommend rehab at d/c  Barriers to Discharge: Decreased caregiver support, Inaccessible home environment  Barriers to Discharge Comments: Pt resides ALONE;  Recommendation: Post acute IP rehab     PT - OK to Discharge: Yes(when stable to rehab)    See flowsheet documentation for full assessment

## 2020-02-17 NOTE — OCCUPATIONAL THERAPY NOTE
Occupational Therapy Evaluation     Patient Name: Ana M Dye  GQFLD'S Date: 2/17/2020  Problem List  Principal Problem:    Fall at home, initial encounter  Active Problems:    Hypothyroidism    CKD (chronic kidney disease)    Hyperlipidemia    Hypertension    Elevated troponin    CAD (coronary artery disease)    Type 2 diabetes mellitus, without long-term current use of insulin (HCC)    Chest pain    Past Medical History  Past Medical History:   Diagnosis Date    Acquired absence of kidney     Frequency of micturition     GERD (gastroesophageal reflux disease)     Hypertension     Malignant neoplasm of left kidney, except renal pelvis (HCC)     Nocturia     Personal history of other malignant neoplasm of kidney     Renal mass, left     UTI (urinary tract infection)      Past Surgical History  Past Surgical History:   Procedure Laterality Date    RADICAL NEPHRECTOMY Left 2013 02/17/20 0912   Note Type   Note type Eval only   Restrictions/Precautions   Weight Bearing Precautions Per Order No   Other Precautions Cognitive; Chair Alarm; Bed Alarm;Telemetry;Multiple lines; Fall Risk;Pain   Pain Assessment   Pain Assessment No/denies pain   Pain Score No Pain   Home Living   Type of Home [facility] with 3STE)   Home Layout One level; Laundry in basement   Integrated Medical Management Walker;Life alert   Additional Comments Pt resides in a [facility] with 3 YVES and laundry in the basement  No local friends or family that can provide support  Reports she stopped wearing her life alert because they were "always coming"  Prior Function   Level of [place] Independent with ADLs and functional mobility   Lives With Alone   ADL Assistance Independent   IADLs Independent   Falls in the last 6 months   ("too many to count")   Vocational Retired   Lifestyle   Autonomy Pt resides alone in a [facility] with 3STE    Neighbor provides occassional checks, but otherwise no significant support system  Pt reports being I with ADLs, IADLs, and functional mobility with use of rw for long distances  Pt is a  and manages her medications  Reciprocal Relationships had 2 daughters, but both are   Very close with her neighbor Saida Sexton, however incident ~6 mo ago where they both fell resulting in hospitalizations and neighbor has been in a facility since   Service to Others retired   Intrinsic Gratification Loves to collect dolls (has over 48)  Likes to go to [place] and used to go to [employer] daily with Saida Sexton (neighbor), but hasn't since their fall  Psychosocial   Psychosocial (WDL) WDL   Subjective   Subjective "I'm like handicapped now"  Patient appears to have somewhat of an understanding that she requires more assistance than her PLOF, but limited safety insight, and reports she doesn't want anyone to assist her at home, even with IADLs like laundry and transportation  ADL   Eating Assistance 5  Supervision/Setup   Grooming Assistance 4  Minimal Assistance   Grooming Deficit Brushing hair;Wash/dry face; Teeth care   UB Bathing Assistance 4  Minimal Assistance   UB Bathing Deficit Setup; Increased time to complete   LB Bathing Assistance 3  Moderate Assistance   UB Dressing Assistance 3  Moderate Assistance   LB Dressing Assistance 3  Moderate Assistance   LB Dressing Deficit Don/doff R sock; Don/doff L sock; Thread RLE into pants; Thread LLE into pants;Pull up over hips; Fasteners   Toileting Assistance  4  Minimal Assistance   Additional Comments pt completes self care tasks while seated in recliner  Pt completes grooming with Dalia due to poor thoroughness  Pt completes UB and LB sponge bathing with min and modA, with very poor thoroughness  Pt dons bra and blouse with modA, requiring assistance to pull over head and fasteners (buttons)  Pt completes bra fastener and then pulls bra over head, requiring assistance to manage pulling it down    Pt requires assistance to don L sock and pt dons R sock with Dalia  Pt requires assistance to thread b/l LEs into pant legs, but then is able to pull over hips in standing with assistance for steadying  Bed Mobility   Additional Comments Pt seated in recliner upon therapist entry and bed mobility not assessed   Transfers   Sit to Stand 4  Minimal assistance   Additional items Assist x 1; Increased time required;Verbal cues   Stand to Sit 4  Minimal assistance   Additional items Assist x 1; Increased time required;Verbal cues   Toilet transfer 3  Moderate assistance   Additional items Assist x 1; Increased time required;Standard toilet;Verbal cues   Additional Comments Pt completes functional transfers with use of rw and cues for safe hand placement/positioning/to control descent   Functional Mobility   Functional Mobility 4  Minimal assistance   Additional Comments Ax1 to ambulate household distances with use of rw and cues for safety/rw use     Balance   Static Sitting Good   Dynamic Sitting Fair   Static Standing Fair -   Dynamic Standing Fair -   Ambulatory Poor +   Activity Tolerance   Activity Tolerance Patient limited by fatigue   Medical Staff Made Aware PT, CM   Nurse Made Aware Pt cleared by RN for OT evaluation and OOB mobility   RUE Assessment   RUE Assessment   (generalized weakness, AROM WFL)   LUE Assessment   LUE Assessment   (generalized weakness, AROM WFL)   Vision-Basic Assessment   Current Vision Wears glasses all the time   Vision - Complex Assessment   Ocular Range of Motion WFL   Head Position WDL   Tracking Able to track stimulus in all quads without difficulty   Cognition   Overall Cognitive Status Impaired   Arousal/Participation Arousable;Responsive   Attention Attends with cues to redirect   Orientation Level Oriented X4   Memory Decreased recall of precautions;Decreased short term memory   Following Commands Follows one step commands with increased time or repetition   Comments Pt is very pleasant and cooperative, but has limited insight into deficits and poor safety awareness  Pt completed the ACLS <6 months ago and scored 4 2/6 implying that she requires 24/7 supervision and SHOULD NOT BE DRIVING OR MANAGING HER MEDICATIONS  Assessment   Limitation Decreased ADL status; Decreased Safe judgement during ADL;Decreased cognition;Decreased endurance;Decreased self-care trans;Decreased high-level ADLs   Prognosis Fair   Assessment Pt is an 80year old female seen for initial OT evaluation s/p admission to [facility] 2/16/20 s/p mechanical fall on steps with laundry basket resulting in multiple abrasions with trauma imaging negative for acute injury  Pt reports (-) head strike  Additional active problems include: chest pain, elevated troponin, DM, CAD, HTN, HLD, CKD, and hypothyroidism  Pt with active OT orders and cleared by RN for OT evaluation and OOB mobility  Pt resides ALONE in a [facility] with 3STE  Pt reports being I with ADLs, IADLs, and functional mobility with use of rw for long distances  PT IS A  AND MANAGES HER MEDICATIONS  Pt reports that she has a neighbor that occasionally checks on her, but no one that can provide assist at home  Pt is currently demonstrating the following occupational deficits: eating with set-up, grooming with Dalia, UB bathing with Dalia, LB bathing with modA, UB dressing with modA, LB dressing with modA, toileting with Dalia, functional transfers with Dalia, and functional mobility with Dalia  Factors currently limiting pt's independence in these areas include: cognitive deficits, generalized weakness, pain, balance, functional mobility, endurance, and unsupportive home environment (ALONE)  From an OT standpoint, recommend STR upon d/c  Pt is to continue to benefit from skilled occupational therapy services while in the hospital to maximize functioning and independence with daily activities  See below for OT goals to be addressed 3-5x/wk     Goals   Patient Goals to get dressed before her company comes   Plan   Treatment Interventions ADL retraining;Functional transfer training; Endurance training;Cognitive reorientation;Patient/family training;Equipment evaluation/education; Compensatory technique education;Continued evaluation; Activityengagement   Goal Expiration Date 02/27/20   OT Treatment Day 1   OT Frequency 3-5x/wk   Recommendation   OT Discharge Recommendation Short Term Rehab  (and 24/7 supervision)   Equipment Recommended Tub seat with back   OT - OK to Discharge Yes  (to STR when medically stable)   Barthel Index   Feeding 10   Bathing 0   Grooming Score 0   Dressing Score 5   Bladder Score 10   Bowels Score 10   Toilet Use Score 5   Transfers (Bed/Chair) Score 10   Mobility (Level Surface) Score 10   Stairs Score 0   Barthel Index Score 60     Goals:    Pt will be attentive 100% of the time during ongoing cognitive assessment w/ G participation to assist w/ safe d/c planning/recommendations  Pt will complete all self care tasks w/ Pedro with use of DME/AD as appropriate  Pt will improve functional transfers to Mod I on/off all surfaces using DME as needed w/ G balance/safety     Pt will improve functional mobility during ADL/IADL/leisure tasks to Mod I using DME as needed w/ G balance/safety     Pt will participate in simulated IADL management task to increase independence to Mod I w/ G safety and endurance    Pt will demonstrate G carryover of pt/caregiver education and training as appropriate w/o cues w/ good tolerance to increase safety during functional tasks    Pt will maintain standing upright I'ly or at least 10 minutes while completing a dynamic functional activity with good balance and endurance      Javier Nelson, MATT, OTR/L

## 2020-02-18 LAB
GLUCOSE SERPL-MCNC: 120 MG/DL (ref 65–140)
GLUCOSE SERPL-MCNC: 157 MG/DL (ref 65–140)
GLUCOSE SERPL-MCNC: 160 MG/DL (ref 65–140)
GLUCOSE SERPL-MCNC: 257 MG/DL (ref 65–140)

## 2020-02-18 PROCEDURE — 82948 REAGENT STRIP/BLOOD GLUCOSE: CPT

## 2020-02-18 PROCEDURE — 99232 SBSQ HOSP IP/OBS MODERATE 35: CPT | Performed by: INTERNAL MEDICINE

## 2020-02-18 RX ADMIN — CYANOCOBALAMIN TAB 500 MCG 1000 MCG: 500 TAB at 08:03

## 2020-02-18 RX ADMIN — DOCUSATE SODIUM 100 MG: 100 CAPSULE, LIQUID FILLED ORAL at 08:04

## 2020-02-18 RX ADMIN — HEPARIN SODIUM 5000 UNITS: 5000 INJECTION INTRAVENOUS; SUBCUTANEOUS at 21:39

## 2020-02-18 RX ADMIN — INSULIN LISPRO 1 UNITS: 100 INJECTION, SOLUTION INTRAVENOUS; SUBCUTANEOUS at 21:39

## 2020-02-18 RX ADMIN — PRAVASTATIN SODIUM 80 MG: 80 TABLET ORAL at 16:56

## 2020-02-18 RX ADMIN — ASPIRIN 81 MG 81 MG: 81 TABLET ORAL at 08:04

## 2020-02-18 RX ADMIN — MAGNESIUM 64 MG (MAGNESIUM CHLORIDE) TABLET,DELAYED RELEASE 64 MG: at 08:03

## 2020-02-18 RX ADMIN — HEPARIN SODIUM 5000 UNITS: 5000 INJECTION INTRAVENOUS; SUBCUTANEOUS at 13:48

## 2020-02-18 RX ADMIN — INSULIN LISPRO 1 UNITS: 100 INJECTION, SOLUTION INTRAVENOUS; SUBCUTANEOUS at 16:56

## 2020-02-18 RX ADMIN — LEVOTHYROXINE SODIUM 75 MCG: 75 TABLET ORAL at 06:01

## 2020-02-18 RX ADMIN — PANTOPRAZOLE SODIUM 40 MG: 40 TABLET, DELAYED RELEASE ORAL at 07:57

## 2020-02-18 RX ADMIN — INSULIN LISPRO 2 UNITS: 100 INJECTION, SOLUTION INTRAVENOUS; SUBCUTANEOUS at 10:53

## 2020-02-18 RX ADMIN — HEPARIN SODIUM 5000 UNITS: 5000 INJECTION INTRAVENOUS; SUBCUTANEOUS at 06:01

## 2020-02-18 RX ADMIN — METOPROLOL SUCCINATE 25 MG: 25 TABLET, EXTENDED RELEASE ORAL at 08:04

## 2020-02-18 NOTE — ASSESSMENT & PLAN NOTE
· Had complained about approximately 5 minute episode of chest pain following fall, likely costochondritis in setting of fall  · EKG essentially unchanged from prior however troponin 0 07 on initial check  And increase noted up to 0 28  Known history of CAD     given on admission   · Non MI trop elevation per cardiology and no further testing indicated  · Currently CP free

## 2020-02-18 NOTE — RESTORATIVE TECHNICIAN NOTE
Restorative Specialist Mobility Note       Activity: Ambulate in [place]ro[place], Ambulate in room, Bathroom privileges, Chair, Dangle, Stand at bedside(Educated/encouraged pt to ambulate with assistance 3-4 x's/day  Chair alarm on   Pt callbell, phone/tray within reach )     Assistive Device: Front wheel walker       [employer] BS, Restorative Technician, [employer]

## 2020-02-18 NOTE — PROGRESS NOTES
Progress Note - Robert Mendez 1/3/1932, 80 y o  female MRN: [id number]    Unit/Bed#: [facility] 701-01 Encounter: [id number]    Primary Care Provider: Beverlyn Severin, MD   Date and time admitted to hospital: 2/15/2020  7:12 PM      * Fall at home, initial encounter  [facility]  · Patient with mechanical fall at home as above, with multiple abrasions however trauma imaging negative for acute injury  · Multiple skin tears BL forearms, left elbow and left knee  Dressings in place   · Apparently has had several near episodes in past and follows with outpatient PT/OT  · PT/OT recommending rehab  Referrals made  Pt also requesting [facility] referral, will make CM aware though unlikely candidate     Chest pain  Assessment & Plan  · Had complained about approximately 5 minute episode of chest pain following fall, likely costochondritis in setting of fall  · EKG essentially unchanged from prior however troponin 0 07 on initial check  And increase noted up to 0 28  Known history of CAD   given on admission   · Non MI trop elevation per cardiology and no further testing indicated  · Currently CP free     Elevated troponin  Assessment & Plan  · Troponin 0 07 on presentation, may be in setting of fall however had complained of chest pain and additionally with cardiac history as below    0 26, 0 28 followed by 0 27   · Cardiology consulted- non MI trop elevation likely 2/2 chronic renal disease  · No further testing indicated from their standpoint     Type 2 diabetes mellitus, without long-term current use of insulin [facility])  Assessment & Plan  Lab Results   Component Value Date    HGBA1C 6 9 (H) 02/16/2020       Recent Labs     02/17/20  1113 02/17/20  1613 02/17/20  2136 02/18/20  0638   POCGLU 188* 123 146* 120       Blood Sugar Average: Last 72 hrs:  · (P) 466 [id number] hold metformin while inpatient, A1C well controlled  · Cont SSI with Accu-Cheks, carb controlled diet  · Initiate hypoglycemia protocol    CAD (coronary artery disease)  Assessment & Plan  · S/p stenting x1 to RCA approximately 2016  · Continue ASA, beta blocker, statin    Hypertension  Assessment & Plan  · Blood pressure above goal on admission, possibly in setting of fall, has now improved   · Otherwise continue home antihypertensives with hold parameters and monitor blood pressure per protocol    Hyperlipidemia  Assessment & Plan  · Continue statin therapy    CKD (chronic kidney disease)  Assessment & Plan  · Creat within baseline of 1 3-1 6  · Monitor PRN     Hypothyroidism  Assessment & Plan  · Continue Synthroid      VTE Pharmacologic Prophylaxis:   Pharmacologic: Heparin  Mechanical VTE Prophylaxis in Place: Yes    Patient Centered Rounds: I have performed bedside rounds with nursing staff today  Discussions with Specialists or Other Care Team Provider: d/w CM    Education and Discussions with Family / Patient: patient  Time Spent for Care: 30 minutes  More than 50% of total time spent on counseling and coordination of care as described above  Current Length of Stay: 2 day(s)    Current Patient Status: Inpatient   Certification Statement: The patient will continue to require additional inpatient hospital stay due to stable, awaiting rehab     Discharge Plan: stable, awaiting rehab    Code Status: Level 3 - DNAR and DNI      Subjective:   Doing well today  Hopeful for ARC  Objective:     Vitals:   Temp (24hrs), Av 7 °F (36 5 °C), Min:97 6 °F (36 4 °C), Max:97 8 °F (36 6 °C)    Temp:  [97 6 °F (36 4 °C)-97 8 °F (36 6 °C)] 97 8 °F (36 6 °C)  HR:  [53-68] 68  Resp:  [14-20] 14  BP: (120-155)/() 155/69  SpO2:  [96 %-100 %] 96 %  Body mass index is 26 04 kg/m²  Input and Output Summary (last 24 hours):        Intake/Output Summary (Last 24 hours) at 2020 0848  Last data filed at 2020 0701  Gross per 24 hour   Intake 1180 ml   Output 650 ml   Net 530 ml       Physical Exam:     Physical Exam   Constitutional: She is oriented to person, place, and time  No distress  Cardiovascular: Normal rate and regular rhythm  Pulmonary/Chest: Effort normal and breath sounds normal  No respiratory distress  Abdominal: Soft  Bowel sounds are normal  She exhibits no distension  Musculoskeletal: She exhibits no edema  Neurological: She is alert and oriented to person, place, and time  Skin:   Slight bruising to L temporal region and L knee, L forearm    Psychiatric: She has a normal mood and affect  Nursing note and vitals reviewed  Additional Data:     Labs:    Results from last 7 days   Lab Units 02/16/20 0645  02/15/20  1952   WBC Thousand/uL 7 90  --  9 44   HEMOGLOBIN g/dL 10 8*  --  12 1   HEMATOCRIT % 34 5*  --  38 7   PLATELETS Thousands/uL 216   < > 254   NEUTROS PCT %  --   --  82*   LYMPHS PCT %  --   --  10*   MONOS PCT %  --   --  7   EOS PCT %  --   --  1    < > = values in this interval not displayed  Results from last 7 days   Lab Units 02/16/20 0645 02/15/20  1952   POTASSIUM mmol/L 4 6 4 7   CHLORIDE mmol/L 109* 107   CO2 mmol/L 23 24   BUN mg/dL 33* 34*   CREATININE mg/dL 1 43* 1 58*   CALCIUM mg/dL 9 2 9 3   ALK PHOS U/L  --  152*   ALT U/L  --  17   AST U/L  --  28           * I Have Reviewed All Lab Data Listed Above  * Additional Pertinent Lab Tests Reviewed:  All Labs Within Last 24 Hours Reviewed    Imaging:    Imaging Reports Reviewed Today Include: all  Imaging Personally Reviewed by Myself Includes:  none    Recent Cultures (last 7 days):           Last 24 Hours Medication List:     Current Facility-Administered Medications:  acetaminophen 650 mg Oral Q6H PRN Isauro Solis DO   aspirin 81 mg Oral Daily Isauro Solis DO   cyanocobalamin 1,000 mcg Oral Daily Isauro Solis DO   docusate sodium 100 mg Oral BID Isauro Solis DO   heparin (porcine) 5,000 Units Subcutaneous Q8H [address] Isauro Solis DO   insulin lispro 1-5 Units Subcutaneous TID AC Urban Holguin, DO   insulin lispro 1-5 Units

## 2020-02-18 NOTE — PROGRESS NOTES
Pt's neighbor Lowell Ridley to transport pt to [facility] 2/19 after 10am  Case management aware

## 2020-02-18 NOTE — ASSESSMENT & PLAN NOTE
Lab Results   Component Value Date    HGBA1C 6 9 (H) 02/16/2020       Recent Labs     02/17/20  1113 02/17/20  1613 02/17/20  2136 02/18/20  0638   POCGLU 188* 123 146* 120       Blood Sugar Average: Last 72 hrs:  · (P) 881 [id number] hold metformin while inpatient, A1C well controlled  · Cont SSI with Accu-Cheks, carb controlled diet  · Initiate hypoglycemia protocol

## 2020-02-19 VITALS
BODY MASS INDEX: 26.05 KG/M2 | TEMPERATURE: 97.8 F | RESPIRATION RATE: 18 BRPM | SYSTOLIC BLOOD PRESSURE: 109 MMHG | WEIGHT: 147 LBS | HEIGHT: 63 IN | DIASTOLIC BLOOD PRESSURE: 69 MMHG | OXYGEN SATURATION: 95 % | HEART RATE: 83 BPM

## 2020-02-19 LAB
GLUCOSE SERPL-MCNC: 139 MG/DL (ref 65–140)
GLUCOSE SERPL-MCNC: 202 MG/DL (ref 65–140)

## 2020-02-19 PROCEDURE — 82948 REAGENT STRIP/BLOOD GLUCOSE: CPT

## 2020-02-19 PROCEDURE — 99239 HOSP IP/OBS DSCHRG MGMT >30: CPT | Performed by: INTERNAL MEDICINE

## 2020-02-19 RX ORDER — ACETAMINOPHEN 325 MG/1
650 TABLET ORAL EVERY 6 HOURS PRN
Qty: 30 TABLET | Refills: 0
Start: 2020-02-19

## 2020-02-19 RX ADMIN — LEVOTHYROXINE SODIUM 75 MCG: 75 TABLET ORAL at 05:32

## 2020-02-19 RX ADMIN — METOPROLOL SUCCINATE 25 MG: 25 TABLET, EXTENDED RELEASE ORAL at 08:31

## 2020-02-19 RX ADMIN — DOCUSATE SODIUM 100 MG: 100 CAPSULE, LIQUID FILLED ORAL at 08:32

## 2020-02-19 RX ADMIN — ASPIRIN 81 MG 81 MG: 81 TABLET ORAL at 08:32

## 2020-02-19 RX ADMIN — HEPARIN SODIUM 5000 UNITS: 5000 INJECTION INTRAVENOUS; SUBCUTANEOUS at 05:31

## 2020-02-19 RX ADMIN — CYANOCOBALAMIN TAB 500 MCG 1000 MCG: 500 TAB at 08:31

## 2020-02-19 RX ADMIN — MAGNESIUM 64 MG (MAGNESIUM CHLORIDE) TABLET,DELAYED RELEASE 64 MG: at 08:32

## 2020-02-19 RX ADMIN — PANTOPRAZOLE SODIUM 40 MG: 40 TABLET, DELAYED RELEASE ORAL at 07:25

## 2020-02-19 NOTE — PROGRESS NOTES
Left arm, left knee, and right arm skin tears cleansed; dermagran applied and covered with 4x4/kerlix; pt tolerated well

## 2020-02-19 NOTE — PLAN OF CARE
Problem: PAIN - ADULT  Goal: Verbalizes/displays adequate comfort level or baseline comfort level  Description  Interventions:  - Encourage patient to monitor pain and request assistance  - Assess pain using appropriate pain scale  - Administer analgesics based on type and severity of pain and evaluate response  - Implement non-pharmacological measures as appropriate and evaluate response  - Consider cultural and social influences on pain and pain management  - Notify physician/advanced practitioner if interventions unsuccessful or patient reports new pain  Outcome: Progressing     Problem: SAFETY ADULT  Goal: Patient will remain free of falls  Description  INTERVENTIONS:  - Assess patient frequently for physical needs  -  Identify cognitive and physical deficits and behaviors that affect risk of falls    -  [place] fall precautions as indicated by assessment   - Educate patient/family on patient safety including physical limitations  - Instruct patient to call for assistance with activity based on assessment  - Modify environment to reduce risk of injury  - Consider OT/PT consult to assist with strengthening/mobility  Outcome: Progressing  Goal: Maintain or return to baseline ADL function  Description  INTERVENTIONS:  -  Assess patient's ability to carry out ADLs; assess patient's baseline for ADL function and identify physical deficits which impact ability to perform ADLs (bathing, care of mouth/teeth, toileting, grooming, dressing, etc )  - Assess/evaluate cause of self-care deficits   - Assess range of motion  - Assess patient's mobility; develop plan if impaired  - Assess patient's need for assistive devices and provide as appropriate  - Encourage maximum independence but intervene and supervise when necessary  - Involve family in performance of ADLs  - Assess for home care needs following discharge   - Consider OT consult to assist with ADL evaluation and planning for discharge  - Provide patient education as appropriate  Outcome: Progressing  Goal: Maintain or return mobility status to optimal level  Description  INTERVENTIONS:  - Assess patient's baseline mobility status (ambulation, transfers, stairs, etc )    - Identify cognitive and physical deficits and behaviors that affect mobility  - Identify mobility aids required to assist with transfers and/or ambulation (gait belt, sit-to-stand, lift, walker, cane, etc )  - [place] fall precautions as indicated by assessment  - Record patient progress and toleration of activity level on Mobility SBAR; progress patient to next Phase/Stage  - Instruct patient to call for assistance with activity based on assessment  - Consider rehabilitation consult to assist with strengthening/weightbearing, etc   Outcome: Progressing     Problem: DISCHARGE PLANNING  Goal: Discharge to home or other facility with appropriate resources  Description  INTERVENTIONS:  - Identify barriers to discharge w/patient and caregiver  - Arrange for needed discharge resources and transportation as appropriate  - Identify discharge learning needs (meds, wound care, etc )  - Arrange for interpretive services to assist at discharge as needed  - Refer to Case Management Department for coordinating discharge planning if the patient needs post-hospital services based on physician/advanced practitioner order or complex needs related to functional status, cognitive ability, or social support system  Outcome: Progressing     Problem: Knowledge Deficit  Goal: Patient/family/caregiver demonstrates understanding of disease process, treatment plan, medications, and discharge instructions  Description  Complete learning assessment and assess knowledge base    Interventions:  - Provide teaching at level of understanding  - Provide teaching via preferred learning methods  Outcome: Progressing     Problem: Potential for Falls  Goal: Patient will remain free of falls  Description  INTERVENTIONS:  - Assess patient frequently for physical needs  -  Identify cognitive and physical deficits and behaviors that affect risk of falls    -  [place] fall precautions as indicated by assessment   - Educate patient/family on patient safety including physical limitations  - Instruct patient to call for assistance with activity based on assessment  - Modify environment to reduce risk of injury  - Consider OT/PT consult to assist with strengthening/mobility  Outcome: Progressing     Problem: Prexisting or High Potential for Compromised Skin Integrity  Goal: Skin integrity is maintained or improved  Description  INTERVENTIONS:  - Identify patients at risk for skin breakdown  - Assess and monitor skin integrity  - Assess and monitor nutrition and hydration status  - Monitor labs   - Assess for incontinence   - Turn and reposition patient  - Assist with mobility/ambulation  - Relieve pressure over bony prominences  - Avoid friction and shearing  - Provide appropriate hygiene as needed including keeping skin clean and dry  - Evaluate need for skin moisturizer/barrier cream  - Collaborate with interdisciplinary team   - Patient/family teaching  - Consider wound care consult   Outcome: Progressing     Problem: SKIN/TISSUE INTEGRITY - ADULT  Goal: Skin integrity remains intact  Description  INTERVENTIONS  - Identify patients at risk for skin breakdown  - Assess and monitor skin integrity  - Assess and monitor nutrition and hydration status  - Monitor labs (i e  albumin)  - Assess for incontinence   - Turn and reposition patient  - Assist with mobility/ambulation  - Relieve pressure over bony prominences  - Avoid friction and shearing  - Provide appropriate hygiene as needed including keeping skin clean and dry  - Evaluate need for skin moisturizer/barrier cream  - Collaborate with interdisciplinary team (i e  Nutrition, Rehabilitation, etc )   - Patient/family teaching  Outcome: Progressing  Goal: Incision(s), wounds(s) or drain site(s) healing without S/S of infection  Description  INTERVENTIONS  - Assess and document risk factors for skin impairment   - Assess and document dressing, incision, wound bed, drain sites and surrounding tissue  - Consider nutrition services referral as needed  - Oral mucous membranes remain intact  - Provide patient/ family education  Outcome: Progressing

## 2020-02-19 NOTE — DISCHARGE SUMMARY
Discharge- Salome Boyce 1/3/1932, 80 y o  female MRN: [id number]    [facility] Encounter: [id number]    Primary Care Provider: Martínez Zayas MD   Date and time admitted to hospital: 2/15/2020  7:12 PM    * Fall at home, initial encounter  [facility]  · Patient with mechanical fall at home as above, with multiple abrasions however trauma imaging negative for acute injury  · Multiple skin tears BL forearms, left elbow and left knee  Dressings in place   · Apparently has had several near episodes in past and follows with outpatient PT/OT  · PT/OT recommending rehab at discharge     Chest pain  Assessment & Plan  · Had complained about approximately 5 minute episode of chest pain following fall, likely costochondritis in setting of fall  · EKG essentially unchanged from prior however troponin 0 07 on initial check  And increase noted up to 0 28  Known history of CAD   given on admission   · Non MI trop elevation per cardiology and no further testing indicated  · Currently CP free     Elevated troponin  Assessment & Plan  · Troponin 0 07 on presentation, may be in setting of fall however had complained of chest pain and additionally with cardiac history as below    0 26, 0 28 followed by 0 27   · Cardiology consulted- non MI trop elevation likely 2/2 chronic renal disease  · No further testing indicated from their standpoint     Type 2 diabetes mellitus, without long-term current use of insulin [facility])  Assessment & Plan  Lab Results   Component Value Date    HGBA1C 6 9 (H) 02/16/2020       Recent Labs     02/18/20  1051 02/18/20  1553 02/18/20  2100 02/19/20  0701   POCGLU 257* 160* 157* 139       Blood Sugar Average: Last 72 hrs:  · (P) 772 [id number] resume metformin while inpatient, A1C well controlled    CAD (coronary artery disease)  Assessment & Plan  · S/p stenting x1 to RCA approximately 2016  · Continue ASA, beta blocker, statin    Hypertension  Assessment & Plan  · Blood pressure above goal on admission, possibly in setting of fall, has now improved   · Otherwise continue home antihypertensives with hold parameters and monitor blood pressure per protocol    Hyperlipidemia  Assessment & Plan  · Continue statin therapy    CKD (chronic kidney disease)  Assessment & Plan  · Creat within baseline of 1 3-1 6  · Monitor PRN     Hypothyroidism  Assessment & Plan  · Continue Synthroid      Discharging Physician / Practitioner: Mortimer Barrs, PA-C  PCP: Julián Le MD  Admission Date:   Admission Orders (From admission, onward)     Ordered        02/16/20 1231  Inpatient Admission  Once         02/15/20 2110  Place in Observation  Once                   Discharge Date: 02/19/20    Resolved Problems  Date Reviewed: 2/19/2020    None          Consultations During Hospital Stay:  · Cardiology    Procedures Performed:   CT head 2/15: 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 and small bilateral basal ganglia lacunar infarctions  No acute intracranial abnormality  CT C spine 2/15: Multilevel degenerative spondylosis  Developing DISH  No acute cervical spine fracture or traumatic malalignment  CXR: no acute abnormality     Troponin: 0 07, 0 26, 0 28, 0 27    A1C 6 9    Multiple EKGs    Significant Findings / Test Results:   · See above    Incidental Findings:   · See above    Test Results Pending at Discharge (will require follow up):   · none     Outpatient Tests Requested:  · F/u PCP    Complications:  none    Reason for Admission: fall, CP    Hospital Course:     Lauri Emmanuel is a 80 y o  female patient with PMHx of CAD s/p stenting 2016, CKD, HTN, HLD, DM2 who originally presented to the hospital on 2/15/2020 due to fall at home while carrying laundry with mild associated CP afterward  She initially presented as a trauma alert after a fall at home or patient fell backwards down 3 for stairs while carrying laundry from the basement    She denied any dizziness/lightheadedness or chest pain with the fall, and denied any loss of consciousness  Had complained of bilateral arm pain and left knee pain on arrival to ED  Additionally, immediately after fall had complained of approximately 5 minutes of central chest pain without radiation not associated with shortness breath, nausea, or vomiting for which patient did not take medications to relieve pain  She reports complete resolution of chest pain after this episode  Seen by cardiology and did not feel she needed any additional workup  PT/OT recommended rehab and she will be discharged there in stable condition  Please see above list of diagnoses and related plan for additional information  Condition at Discharge: stable     Discharge Day Visit / Exam:     Subjective:  Doing well today  Denies any acute complaints  Vitals: Blood Pressure: 160/84 (02/19/20 0658)  Pulse: 68 (02/19/20 0658)  Temperature: 97 8 °F (36 6 °C) (02/18/20 1500)  Temp Source: Oral (02/15/20 1915)  Respirations: 19 (02/19/20 0658)  Height: 5' 3" (160 cm) (02/18/20 1158)  Weight - Scale: 66 7 kg (147 lb) (02/18/20 1158)  SpO2: 96 % (02/19/20 5884)  Exam:   Physical Exam   Constitutional: She is oriented to person, place, and time  No distress  Cardiovascular: Normal rate and regular rhythm  Pulmonary/Chest: Effort normal and breath sounds normal  No respiratory distress  Abdominal: Soft  Bowel sounds are normal  She exhibits no distension  Musculoskeletal: She exhibits no edema  Neurological: She is alert and oriented to person, place, and time  Skin:   Some skin tears and abrasions noted    Psychiatric: She has a normal mood and affect  Nursing note and vitals reviewed  Discussion with Family: patient  Discharge instructions/Information to patient and family:   See after visit summary for information provided to patient and family        Provisions for Follow-Up Care:  See after visit summary for information related to follow-up care and any pertinent home health orders  Disposition:     Other: Jos Knowles     For Discharges to [facility]:   · Not Applicable to this Patient - Not Applicable to this Patient    Planned Readmission: no     Discharge Statement:  I spent 35 minutes discharging the patient  This time was spent on the day of discharge  I had direct contact with the patient on the day of discharge  Greater than 50% of the total time was spent examining patient, answering all patient questions, arranging and discussing plan of care with patient as well as directly providing post-discharge instructions  Additional time then spent on discharge activities  Discharge Medications:  See after visit summary for reconciled discharge medications provided to patient and family        ** Please Note: This note has been constructed using a voice recognition system **

## 2020-02-19 NOTE — DISCHARGE INSTR - OTHER ORDERS
Skin tears cleanse with saline, apply dermagran and cover with kerlex   Other wound care orders TBD by MD at facility

## 2020-02-19 NOTE — PLAN OF CARE
Problem: PAIN - ADULT  Goal: Verbalizes/displays adequate comfort level or baseline comfort level  Description  Interventions:  - Encourage patient to monitor pain and request assistance  - Assess pain using appropriate pain scale  - Administer analgesics based on type and severity of pain and evaluate response  - Implement non-pharmacological measures as appropriate and evaluate response  - Consider cultural and social influences on pain and pain management  - Notify physician/advanced practitioner if interventions unsuccessful or patient reports new pain  Outcome: Progressing     Problem: SAFETY ADULT  Goal: Patient will remain free of falls  Description  INTERVENTIONS:  - Assess patient frequently for physical needs  -  Identify cognitive and physical deficits and behaviors that affect risk of falls    -  [place] fall precautions as indicated by assessment   - Educate patient/family on patient safety including physical limitations  - Instruct patient to call for assistance with activity based on assessment  - Modify environment to reduce risk of injury  - Consider OT/PT consult to assist with strengthening/mobility  Outcome: Progressing  Goal: Maintain or return to baseline ADL function  Description  INTERVENTIONS:  -  Assess patient's ability to carry out ADLs; assess patient's baseline for ADL function and identify physical deficits which impact ability to perform ADLs (bathing, care of mouth/teeth, toileting, grooming, dressing, etc )  - Assess/evaluate cause of self-care deficits   - Assess range of motion  - Assess patient's mobility; develop plan if impaired  - Assess patient's need for assistive devices and provide as appropriate  - Encourage maximum independence but intervene and supervise when necessary  - Involve family in performance of ADLs  - Assess for home care needs following discharge   - Consider OT consult to assist with ADL evaluation and planning for discharge  - Provide patient education as appropriate  Outcome: Progressing  Goal: Maintain or return mobility status to optimal level  Description  INTERVENTIONS:  - Assess patient's baseline mobility status (ambulation, transfers, stairs, etc )    - Identify cognitive and physical deficits and behaviors that affect mobility  - Identify mobility aids required to assist with transfers and/or ambulation (gait belt, sit-to-stand, lift, walker, cane, etc )  - [place] fall precautions as indicated by assessment  - Record patient progress and toleration of activity level on Mobility SBAR; progress patient to next Phase/Stage  - Instruct patient to call for assistance with activity based on assessment  - Consider rehabilitation consult to assist with strengthening/weightbearing, etc   Outcome: Progressing     Problem: DISCHARGE PLANNING  Goal: Discharge to home or other facility with appropriate resources  Description  INTERVENTIONS:  - Identify barriers to discharge w/patient and caregiver  - Arrange for needed discharge resources and transportation as appropriate  - Identify discharge learning needs (meds, wound care, etc )  - Arrange for interpretive services to assist at discharge as needed  - Refer to Case Management Department for coordinating discharge planning if the patient needs post-hospital services based on physician/advanced practitioner order or complex needs related to functional status, cognitive ability, or social support system  Outcome: Progressing     Problem: Knowledge Deficit  Goal: Patient/family/caregiver demonstrates understanding of disease process, treatment plan, medications, and discharge instructions  Description  Complete learning assessment and assess knowledge base    Interventions:  - Provide teaching at level of understanding  - Provide teaching via preferred learning methods  Outcome: Progressing     Problem: Potential for Falls  Goal: Patient will remain free of falls  Description  INTERVENTIONS:  - Assess patient frequently for physical needs  -  Identify cognitive and physical deficits and behaviors that affect risk of falls    -  [place] fall precautions as indicated by assessment   - Educate patient/family on patient safety including physical limitations  - Instruct patient to call for assistance with activity based on assessment  - Modify environment to reduce risk of injury  - Consider OT/PT consult to assist with strengthening/mobility  Outcome: Progressing     Problem: Prexisting or High Potential for Compromised Skin Integrity  Goal: Skin integrity is maintained or improved  Description  INTERVENTIONS:  - Identify patients at risk for skin breakdown  - Assess and monitor skin integrity  - Assess and monitor nutrition and hydration status  - Monitor labs   - Assess for incontinence   - Turn and reposition patient  - Assist with mobility/ambulation  - Relieve pressure over bony prominences  - Avoid friction and shearing  - Provide appropriate hygiene as needed including keeping skin clean and dry  - Evaluate need for skin moisturizer/barrier cream  - Collaborate with interdisciplinary team   - Patient/family teaching  - Consider wound care consult   Outcome: Progressing

## 2020-02-19 NOTE — SOCIAL WORK
CM was informed that pt is medically stable for dc today  Met with pt to discuss same  Pt states her neighbor [facility] [phone] will transport her at 11am  CM called and spoke to [facility] to confirm same  [facility] states he will pick pt up in the [address] [place] [address] lobby and transport to [facility]  CM notified Arabella Dears at [facility] and pt's bedside ERIKA Merino of dc time  Facility transfer form completed  Chart copy requested

## 2020-02-19 NOTE — ASSESSMENT & PLAN NOTE
Lab Results   Component Value Date    HGBA1C 6 9 (H) 02/16/2020       Recent Labs     02/18/20  1051 02/18/20  1553 02/18/20  2100 02/19/20  0701   POCGLU 257* 160* 157* 139       Blood Sugar Average: Last 72 hrs:  · (P) 710 [id number] resume metformin while inpatient, A1C well controlled

## 2020-02-19 NOTE — ASSESSMENT & PLAN NOTE
· Patient with mechanical fall at home as above, with multiple abrasions however trauma imaging negative for acute injury  · Multiple skin tears BL forearms, left elbow and left knee    Dressings in place   · Apparently has had several near episodes in past and follows with outpatient PT/OT  · PT/OT recommending rehab at discharge

## 2020-02-19 NOTE — PLAN OF CARE
Problem: PAIN - ADULT  Goal: Verbalizes/displays adequate comfort level or baseline comfort level  Description  Interventions:  - Encourage patient to monitor pain and request assistance  - Assess pain using appropriate pain scale  - Administer analgesics based on type and severity of pain and evaluate response  - Implement non-pharmacological measures as appropriate and evaluate response  - Consider cultural and social influences on pain and pain management  - Notify physician/advanced practitioner if interventions unsuccessful or patient reports new pain  2/19/2020 0918 by Prachi Lincoln RN  Outcome: Completed  2/19/2020 0729 by Prachi Lincoln RN  Outcome: Progressing     Problem: SAFETY ADULT  Goal: Patient will remain free of falls  Description  INTERVENTIONS:  - Assess patient frequently for physical needs  -  Identify cognitive and physical deficits and behaviors that affect risk of falls    -  [place] fall precautions as indicated by assessment   - Educate patient/family on patient safety including physical limitations  - Instruct patient to call for assistance with activity based on assessment  - Modify environment to reduce risk of injury  - Consider OT/PT consult to assist with strengthening/mobility  2/19/2020 0918 by Prachi Lincoln RN  Outcome: Completed  2/19/2020 0729 by Prachi Lincoln RN  Outcome: Progressing  Goal: Maintain or return to baseline ADL function  Description  INTERVENTIONS:  -  Assess patient's ability to carry out ADLs; assess patient's baseline for ADL function and identify physical deficits which impact ability to perform ADLs (bathing, care of mouth/teeth, toileting, grooming, dressing, etc )  - Assess/evaluate cause of self-care deficits   - Assess range of motion  - Assess patient's mobility; develop plan if impaired  - Assess patient's need for assistive devices and provide as appropriate  - Encourage maximum independence but intervene and supervise when necessary  - Involve family in performance of ADLs  - Assess for home care needs following discharge   - Consider OT consult to assist with ADL evaluation and planning for discharge  - Provide patient education as appropriate  2/19/2020 0918 by Karyn Mortensen RN  Outcome: Completed  2/19/2020 0729 by Karyn Mortensen RN  Outcome: Progressing  Goal: Maintain or return mobility status to optimal level  Description  INTERVENTIONS:  - Assess patient's baseline mobility status (ambulation, transfers, stairs, etc )    - Identify cognitive and physical deficits and behaviors that affect mobility  - Identify mobility aids required to assist with transfers and/or ambulation (gait belt, sit-to-stand, lift, walker, cane, etc )  - [place] fall precautions as indicated by assessment  - Record patient progress and toleration of activity level on Mobility SBAR; progress patient to next Phase/Stage  - Instruct patient to call for assistance with activity based on assessment  - Consider rehabilitation consult to assist with strengthening/weightbearing, etc   2/19/2020 0918 by Karyn Mortensen RN  Outcome: Completed  2/19/2020 0729 by Karyn Mortensen RN  Outcome: Progressing     Problem: DISCHARGE PLANNING  Goal: Discharge to home or other facility with appropriate resources  Description  INTERVENTIONS:  - Identify barriers to discharge w/patient and caregiver  - Arrange for needed discharge resources and transportation as appropriate  - Identify discharge learning needs (meds, wound care, etc )  - Arrange for interpretive services to assist at discharge as needed  - Refer to Case Management Department for coordinating discharge planning if the patient needs post-hospital services based on physician/advanced practitioner order or complex needs related to functional status, cognitive ability, or social support system  2/19/2020 0918 by Karyn Mortensen RN  Outcome: Completed  2/19/2020 0729 by Karyn Mortensen RN  Outcome: Progressing     Problem: Knowledge Deficit  Goal: Patient/family/caregiver demonstrates understanding of disease process, treatment plan, medications, and discharge instructions  Description  Complete learning assessment and assess knowledge base  Interventions:  - Provide teaching at level of understanding  - Provide teaching via preferred learning methods  2/19/2020 0918 by Richard Gracia RN  Outcome: Completed  2/19/2020 0729 by Richard Gracia RN  Outcome: Progressing     Problem: Potential for Falls  Goal: Patient will remain free of falls  Description  INTERVENTIONS:  - Assess patient frequently for physical needs  -  Identify cognitive and physical deficits and behaviors that affect risk of falls    -  [place] fall precautions as indicated by assessment   - Educate patient/family on patient safety including physical limitations  - Instruct patient to call for assistance with activity based on assessment  - Modify environment to reduce risk of injury  - Consider OT/PT consult to assist with strengthening/mobility  2/19/2020 0918 by Richard Gracia RN  Outcome: Completed  2/19/2020 0729 by Richard Gracia RN  Outcome: Progressing     Problem: Prexisting or High Potential for Compromised Skin Integrity  Goal: Skin integrity is maintained or improved  Description  INTERVENTIONS:  - Identify patients at risk for skin breakdown  - Assess and monitor skin integrity  - Assess and monitor nutrition and hydration status  - Monitor labs   - Assess for incontinence   - Turn and reposition patient  - Assist with mobility/ambulation  - Relieve pressure over bony prominences  - Avoid friction and shearing  - Provide appropriate hygiene as needed including keeping skin clean and dry  - Evaluate need for skin moisturizer/barrier cream  - Collaborate with interdisciplinary team   - Patient/family teaching  - Consider wound care consult   2/19/2020 0918 by Richard Gracia RN  Outcome: Completed  2/19/2020 0729 by Prachi Lincoln RN  Outcome: Progressing     Problem: SKIN/TISSUE INTEGRITY - ADULT  Goal: Skin integrity remains intact  Description  INTERVENTIONS  - Identify patients at risk for skin breakdown  - Assess and monitor skin integrity  - Assess and monitor nutrition and hydration status  - Monitor labs (i e  albumin)  - Assess for incontinence   - Turn and reposition patient  - Assist with mobility/ambulation  - Relieve pressure over bony prominences  - Avoid friction and shearing  - Provide appropriate hygiene as needed including keeping skin clean and dry  - Evaluate need for skin moisturizer/barrier cream  - Collaborate with interdisciplinary team (i e  Nutrition, Rehabilitation, etc )   - Patient/family teaching  2/19/2020 7706 by Prachi Lincoln RN  Outcome: Completed  2/19/2020 0729 by Prachi Lincoln RN  Outcome: Progressing  Goal: Incision(s), wounds(s) or drain site(s) healing without S/S of infection  Description  INTERVENTIONS  - Assess and document risk factors for skin impairment   - Assess and document dressing, incision, wound bed, drain sites and surrounding tissue  - Consider nutrition services referral as needed  - Oral mucous membranes remain intact  - Provide patient/ family education  2/19/2020 0918 by Prachi Lincoln RN  Outcome: Completed  2/19/2020 0729 by Prachi Lincoln RN  Outcome: Progressing

## 2020-02-24 ENCOUNTER — APPOINTMENT (OUTPATIENT)
Dept: PHYSICAL THERAPY | Facility: CLINIC | Age: 85
End: 2020-02-24
Payer: MEDICARE

## 2020-02-27 ENCOUNTER — APPOINTMENT (OUTPATIENT)
Dept: PHYSICAL THERAPY | Facility: CLINIC | Age: 85
End: 2020-02-27
Payer: MEDICARE

## 2020-04-30 ENCOUNTER — TELEMEDICINE (OUTPATIENT)
Dept: UROLOGY | Facility: MEDICAL CENTER | Age: 85
End: 2020-04-30
Payer: MEDICARE

## 2020-04-30 DIAGNOSIS — Z90.5 HISTORY OF LEFT RADICAL NEPHRECTOMY: ICD-10-CM

## 2020-04-30 DIAGNOSIS — C64.2 RENAL CELL CANCER, LEFT (HCC): Primary | ICD-10-CM

## 2020-04-30 DIAGNOSIS — Z98.890 STATUS POST ROBOT-ASSISTED SURGICAL PROCEDURE: ICD-10-CM

## 2020-04-30 PROCEDURE — 99443 PR PHYS/QHP TELEPHONE EVALUATION 21-30 MIN: CPT | Performed by: UROLOGY

## 2020-04-30 RX ORDER — FUROSEMIDE 20 MG/1
20 TABLET ORAL
COMMUNITY
Start: 2020-03-13 | End: 2022-01-01 | Stop reason: SDUPTHER

## 2020-04-30 RX ORDER — ESOMEPRAZOLE MAGNESIUM 40 MG/1
1 CAPSULE, DELAYED RELEASE ORAL DAILY
COMMUNITY
Start: 2009-08-24 | End: 2022-01-01

## 2020-07-07 ENCOUNTER — TELEPHONE (OUTPATIENT)
Dept: UROLOGY | Facility: MEDICAL CENTER | Age: 85
End: 2020-07-07

## 2020-07-07 NOTE — TELEPHONE ENCOUNTER
Patient managed by Dr Lorna Victoria  Patient called in to inform Dr Tal Mancilla her doctor stated the spot on her liver is getting larger and he found a spot on her spine  Patient requested to speak with Dr Tal Mancilla regarding the findings    Patient can be reached at [phone]

## 2020-07-07 NOTE — TELEPHONE ENCOUNTER
Call placed to patient and spoke with her and informed her of Dr Hollice Sandhoff recommendations at this time  She is aware and will keep our office in the loop moving forward

## 2020-07-28 NOTE — TELEPHONE ENCOUNTER
Received call from [address] [place] [address] a Physician to Physician call  This from Dr Zofia Cartwright to Dr Severa Calkins  She would like to discuss the patient with Dr Wyonia Justin Dr Severa Calkins can be reached at [phone]  Thank you

## 2020-08-05 NOTE — TELEPHONE ENCOUNTER
Patient called and wants to get some advice about her liver and medicine  Please call her back at [phone]

## 2020-09-20 ENCOUNTER — APPOINTMENT (EMERGENCY)
Dept: RADIOLOGY | Facility: HOSPITAL | Age: 85
DRG: 312 | End: 2020-09-20
Payer: MEDICARE

## 2020-09-20 ENCOUNTER — HOSPITAL ENCOUNTER (INPATIENT)
Facility: HOSPITAL | Age: 85
LOS: 2 days | Discharge: NURSING, REHAB OR LONG-TERM CARE | DRG: 312 | End: 2020-09-23
Attending: EMERGENCY MEDICINE | Admitting: INTERNAL MEDICINE
Payer: MEDICARE

## 2020-09-20 DIAGNOSIS — K59.00 CONSTIPATION: ICD-10-CM

## 2020-09-20 DIAGNOSIS — R77.8 ELEVATED TROPONIN: ICD-10-CM

## 2020-09-20 DIAGNOSIS — I49.8 BRADYARRHYTHMIA: ICD-10-CM
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
## 2020-09-20 DIAGNOSIS — M25.532 LEFT WRIST PAIN: ICD-10-CM

## 2020-09-20 DIAGNOSIS — I95.9 HYPOTENSION: ICD-10-CM

## 2020-09-20 LAB
ALBUMIN SERPL BCP-MCNC: 3.6 G/DL (ref 3.5–5)
ALP SERPL-CCNC: 125 U/L (ref 46–116)
ALT SERPL W P-5'-P-CCNC: 11 U/L (ref 12–78)
ANION GAP SERPL CALCULATED.3IONS-SCNC: 8 MMOL/L (ref 4–13)
AST SERPL W P-5'-P-CCNC: 14 U/L (ref 5–45)
BASOPHILS # BLD AUTO: 0.04 THOUSANDS/ΜL (ref 0–0.1)
BASOPHILS NFR BLD AUTO: 0 % (ref 0–1)
BILIRUB SERPL-MCNC: 0.24 MG/DL (ref 0.2–1)
BUN SERPL-MCNC: 38 MG/DL (ref 5–25)
CALCIUM SERPL-MCNC: 9.4 MG/DL (ref 8.3–10.1)
CHLORIDE SERPL-SCNC: 101 MMOL/L (ref 100–108)
CO2 SERPL-SCNC: 24 MMOL/L (ref 21–32)
CREAT SERPL-MCNC: 1.59 MG/DL (ref 0.6–1.3)
EOSINOPHIL # BLD AUTO: 0.06 THOUSAND/ΜL (ref 0–0.61)
EOSINOPHIL NFR BLD AUTO: 1 % (ref 0–6)
ERYTHROCYTE [DISTWIDTH] IN BLOOD BY AUTOMATED COUNT: 13.1 % (ref 11.6–15.1)
GFR SERPL CREATININE-BSD FRML MDRD: 29 ML/MIN/1.73SQ M
GLUCOSE SERPL-MCNC: 211 MG/DL (ref 65–140)
GLUCOSE SERPL-MCNC: 218 MG/DL (ref 65–140)
HCT VFR BLD AUTO: 38.3 % (ref 34.8–46.1)
HGB BLD-MCNC: 11.8 G/DL (ref 11.5–15.4)
IMM GRANULOCYTES # BLD AUTO: 0.05 THOUSAND/UL (ref 0–0.2)
IMM GRANULOCYTES NFR BLD AUTO: 1 % (ref 0–2)
LYMPHOCYTES # BLD AUTO: 1.5 THOUSANDS/ΜL (ref 0.6–4.47)
LYMPHOCYTES NFR BLD AUTO: 14 % (ref 14–44)
MCH RBC QN AUTO: 29.9 PG (ref 26.8–34.3)
MCHC RBC AUTO-ENTMCNC: 30.8 G/DL (ref 31.4–37.4)
MCV RBC AUTO: 97 FL (ref 82–98)
MONOCYTES # BLD AUTO: 0.99 THOUSAND/ΜL (ref 0.17–1.22)
MONOCYTES NFR BLD AUTO: 10 % (ref 4–12)
NEUTROPHILS # BLD AUTO: 7.8 THOUSANDS/ΜL (ref 1.85–7.62)
NEUTS SEG NFR BLD AUTO: 74 % (ref 43–75)
NRBC BLD AUTO-RTO: 0 /100 WBCS
PLATELET # BLD AUTO: 271 THOUSANDS/UL (ref 149–390)
PMV BLD AUTO: 10.7 FL (ref 8.9–12.7)
POTASSIUM SERPL-SCNC: 5 MMOL/L (ref 3.5–5.3)
PROT SERPL-MCNC: 7.4 G/DL (ref 6.4–8.2)
RBC # BLD AUTO: 3.95 MILLION/UL (ref 3.81–5.12)
SODIUM SERPL-SCNC: 133 MMOL/L (ref 136–145)
TROPONIN I SERPL-MCNC: 0.14 NG/ML
WBC # BLD AUTO: 10.44 THOUSAND/UL (ref 4.31–10.16)

## 2020-09-20 PROCEDURE — 1124F ACP DISCUSS-NO DSCNMKR DOCD: CPT | Performed by: EMERGENCY MEDICINE

## 2020-09-20 PROCEDURE — 73130 X-RAY EXAM OF HAND: CPT

## 2020-09-20 PROCEDURE — 99285 EMERGENCY DEPT VISIT HI MDM: CPT | Performed by: EMERGENCY MEDICINE

## 2020-09-20 PROCEDURE — 72125 CT NECK SPINE W/O DYE: CPT

## 2020-09-20 PROCEDURE — 73110 X-RAY EXAM OF WRIST: CPT

## 2020-09-20 PROCEDURE — 84484 ASSAY OF TROPONIN QUANT: CPT | Performed by: EMERGENCY MEDICINE

## 2020-09-20 PROCEDURE — 96360 HYDRATION IV INFUSION INIT: CPT

## 2020-09-20 PROCEDURE — 85025 COMPLETE CBC W/AUTO DIFF WBC: CPT | Performed by: EMERGENCY MEDICINE

## 2020-09-20 PROCEDURE — 99285 EMERGENCY DEPT VISIT HI MDM: CPT

## 2020-09-20 PROCEDURE — G1004 CDSM NDSC: HCPCS

## 2020-09-20 PROCEDURE — 80053 COMPREHEN METABOLIC PANEL: CPT | Performed by: EMERGENCY MEDICINE

## 2020-09-20 PROCEDURE — 36415 COLL VENOUS BLD VENIPUNCTURE: CPT | Performed by: EMERGENCY MEDICINE

## 2020-09-20 PROCEDURE — 93005 ELECTROCARDIOGRAM TRACING: CPT

## 2020-09-20 PROCEDURE — 82948 REAGENT STRIP/BLOOD GLUCOSE: CPT

## 2020-09-20 PROCEDURE — 70450 CT HEAD/BRAIN W/O DYE: CPT

## 2020-09-20 RX ORDER — GINSENG 100 MG
1 CAPSULE ORAL 2 TIMES DAILY
Status: DISCONTINUED | OUTPATIENT
Start: 2020-09-20 | End: 2020-09-23 | Stop reason: HOSPADM

## 2020-09-20 RX ORDER — ACETAMINOPHEN 325 MG/1
975 TABLET ORAL ONCE
Status: COMPLETED | OUTPATIENT
Start: 2020-09-20 | End: 2020-09-20

## 2020-09-20 RX ADMIN — BACITRACIN 1 SMALL APPLICATION: 500 OINTMENT TOPICAL at 21:48

## 2020-09-20 RX ADMIN — SODIUM CHLORIDE 1000 ML: 0.9 INJECTION, SOLUTION INTRAVENOUS at 23:15

## 2020-09-20 RX ADMIN — ACETAMINOPHEN 975 MG: 325 TABLET, FILM COATED ORAL at 20:51

## 2020-09-20 NOTE — LETTER
Patient needs an outpatient MRI for the abnormality seen in the CT scan of the liver  Thank you    Yordan Hernadez

## 2020-09-21 ENCOUNTER — APPOINTMENT (EMERGENCY)
Dept: RADIOLOGY | Facility: HOSPITAL | Age: 85
DRG: 312 | End: 2020-09-21
Payer: MEDICARE

## 2020-09-21 PROBLEM — R55 SYNCOPE AND COLLAPSE: Status: ACTIVE | Noted: 2019-09-13
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
## 2020-09-21 PROBLEM — R55 VASOVAGAL SYNCOPE: Status: ACTIVE | Noted: 2020-09-21

## 2020-09-21 PROBLEM — K76.9 LIVER LESION, RIGHT LOBE: Status: ACTIVE | Noted: 2020-09-21

## 2020-09-21 LAB
ALBUMIN SERPL BCP-MCNC: 3.5 G/DL (ref 3.5–5)
ALP SERPL-CCNC: 116 U/L (ref 46–116)
ALT SERPL W P-5'-P-CCNC: 13 U/L (ref 12–78)
ANION GAP SERPL CALCULATED.3IONS-SCNC: 7 MMOL/L (ref 4–13)
AST SERPL W P-5'-P-CCNC: 14 U/L (ref 5–45)
BACTERIA UR QL AUTO: ABNORMAL /HPF
BILIRUB SERPL-MCNC: 0.41 MG/DL (ref 0.2–1)
BILIRUB UR QL STRIP: NEGATIVE
BUN SERPL-MCNC: 36 MG/DL (ref 5–25)
CALCIUM SERPL-MCNC: 9.1 MG/DL (ref 8.3–10.1)
CHLORIDE SERPL-SCNC: 103 MMOL/L (ref 100–108)
CHOLEST SERPL-MCNC: 210 MG/DL (ref 50–200)
CLARITY UR: CLEAR
CO2 SERPL-SCNC: 26 MMOL/L (ref 21–32)
COLOR UR: YELLOW
CREAT SERPL-MCNC: 1.41 MG/DL (ref 0.6–1.3)
ERYTHROCYTE [DISTWIDTH] IN BLOOD BY AUTOMATED COUNT: 13.2 % (ref 11.6–15.1)
GFR SERPL CREATININE-BSD FRML MDRD: 33 ML/MIN/1.73SQ M
GLUCOSE SERPL-MCNC: 168 MG/DL (ref 65–140)
GLUCOSE SERPL-MCNC: 174 MG/DL (ref 65–140)
GLUCOSE SERPL-MCNC: 189 MG/DL (ref 65–140)
GLUCOSE SERPL-MCNC: 213 MG/DL (ref 65–140)
GLUCOSE SERPL-MCNC: 217 MG/DL (ref 65–140)
GLUCOSE UR STRIP-MCNC: ABNORMAL MG/DL
HCT VFR BLD AUTO: 36.1 % (ref 34.8–46.1)
HDLC SERPL-MCNC: 74 MG/DL
HGB BLD-MCNC: 11.4 G/DL (ref 11.5–15.4)
HGB UR QL STRIP.AUTO: NEGATIVE
HYALINE CASTS #/AREA URNS LPF: ABNORMAL /LPF
KETONES UR STRIP-MCNC: NEGATIVE MG/DL
LDLC SERPL CALC-MCNC: 106 MG/DL (ref 0–100)
LEUKOCYTE ESTERASE UR QL STRIP: ABNORMAL
MAGNESIUM SERPL-MCNC: 1.7 MG/DL (ref 1.6–2.6)
MCH RBC QN AUTO: 29.9 PG (ref 26.8–34.3)
MCHC RBC AUTO-ENTMCNC: 31.6 G/DL (ref 31.4–37.4)
MCV RBC AUTO: 95 FL (ref 82–98)
NITRITE UR QL STRIP: NEGATIVE
NON-SQ EPI CELLS URNS QL MICRO: ABNORMAL /HPF
NONHDLC SERPL-MCNC: 136 MG/DL
PH UR STRIP.AUTO: 5 [PH]
PHOSPHATE SERPL-MCNC: 3.2 MG/DL (ref 2.3–4.1)
PLATELET # BLD AUTO: 266 THOUSANDS/UL (ref 149–390)
PLATELET # BLD AUTO: 282 THOUSANDS/UL (ref 149–390)
PMV BLD AUTO: 10.3 FL (ref 8.9–12.7)
PMV BLD AUTO: 10.3 FL (ref 8.9–12.7)
POTASSIUM SERPL-SCNC: 4.7 MMOL/L (ref 3.5–5.3)
PROT SERPL-MCNC: 7.2 G/DL (ref 6.4–8.2)
PROT UR STRIP-MCNC: ABNORMAL MG/DL
RBC # BLD AUTO: 3.81 MILLION/UL (ref 3.81–5.12)
RBC #/AREA URNS AUTO: ABNORMAL /HPF
SODIUM SERPL-SCNC: 136 MMOL/L (ref 136–145)
SP GR UR STRIP.AUTO: 1.02 (ref 1–1.03)
TRIGL SERPL-MCNC: 148 MG/DL
TROPONIN I SERPL-MCNC: 0.23 NG/ML
TROPONIN I SERPL-MCNC: 0.3 NG/ML
TROPONIN I SERPL-MCNC: 0.32 NG/ML
TSH SERPL DL<=0.05 MIU/L-ACNC: 1.29 UIU/ML (ref 0.36–3.74)
UROBILINOGEN UR QL STRIP.AUTO: 0.2 E.U./DL
WBC # BLD AUTO: 10.22 THOUSAND/UL (ref 4.31–10.16)
WBC #/AREA URNS AUTO: ABNORMAL /HPF

## 2020-09-21 PROCEDURE — 96361 HYDRATE IV INFUSION ADD-ON: CPT

## 2020-09-21 PROCEDURE — 99232 SBSQ HOSP IP/OBS MODERATE 35: CPT | Performed by: INTERNAL MEDICINE

## 2020-09-21 PROCEDURE — 97163 PT EVAL HIGH COMPLEX 45 MIN: CPT

## 2020-09-21 PROCEDURE — 84443 ASSAY THYROID STIM HORMONE: CPT | Performed by: INTERNAL MEDICINE

## 2020-09-21 PROCEDURE — 99222 1ST HOSP IP/OBS MODERATE 55: CPT | Performed by: INTERNAL MEDICINE

## 2020-09-21 PROCEDURE — 84484 ASSAY OF TROPONIN QUANT: CPT | Performed by: INTERNAL MEDICINE

## 2020-09-21 PROCEDURE — 82948 REAGENT STRIP/BLOOD GLUCOSE: CPT

## 2020-09-21 PROCEDURE — 85027 COMPLETE CBC AUTOMATED: CPT | Performed by: INTERNAL MEDICINE

## 2020-09-21 PROCEDURE — 80061 LIPID PANEL: CPT | Performed by: INTERNAL MEDICINE

## 2020-09-21 PROCEDURE — 81001 URINALYSIS AUTO W/SCOPE: CPT | Performed by: INTERNAL MEDICINE

## 2020-09-21 PROCEDURE — 84100 ASSAY OF PHOSPHORUS: CPT | Performed by: INTERNAL MEDICINE

## 2020-09-21 PROCEDURE — G1004 CDSM NDSC: HCPCS

## 2020-09-21 PROCEDURE — 83735 ASSAY OF MAGNESIUM: CPT | Performed by: INTERNAL MEDICINE

## 2020-09-21 PROCEDURE — 80053 COMPREHEN METABOLIC PANEL: CPT | Performed by: INTERNAL MEDICINE

## 2020-09-21 PROCEDURE — 99223 1ST HOSP IP/OBS HIGH 75: CPT | Performed by: INTERNAL MEDICINE

## 2020-09-21 PROCEDURE — 93005 ELECTROCARDIOGRAM TRACING: CPT

## 2020-09-21 PROCEDURE — 74176 CT ABD & PELVIS W/O CONTRAST: CPT

## 2020-09-21 PROCEDURE — 71250 CT THORAX DX C-: CPT

## 2020-09-21 PROCEDURE — 85049 AUTOMATED PLATELET COUNT: CPT | Performed by: INTERNAL MEDICINE

## 2020-09-21 RX ORDER — MAGNESIUM HYDROXIDE/ALUMINUM HYDROXICE/SIMETHICONE 120; 1200; 1200 MG/30ML; MG/30ML; MG/30ML
15 SUSPENSION ORAL EVERY 6 HOURS PRN
Status: DISCONTINUED | OUTPATIENT
Start: 2020-09-21 | End: 2020-09-23 | Stop reason: HOSPADM

## 2020-09-21 RX ORDER — AMLODIPINE BESYLATE 5 MG/1
5 TABLET ORAL ONCE
Status: COMPLETED | OUTPATIENT
Start: 2020-09-21 | End: 2020-09-21

## 2020-09-21 RX ORDER — DOCUSATE SODIUM 100 MG/1
100 CAPSULE, LIQUID FILLED ORAL 2 TIMES DAILY
Status: DISCONTINUED | OUTPATIENT
Start: 2020-09-21 | End: 2020-09-23 | Stop reason: HOSPADM

## 2020-09-21 RX ORDER — METOPROLOL SUCCINATE 25 MG/1
25 TABLET, EXTENDED RELEASE ORAL DAILY
Status: CANCELLED | OUTPATIENT
Start: 2020-09-21

## 2020-09-21 RX ORDER — PANTOPRAZOLE SODIUM 40 MG/1
40 TABLET, DELAYED RELEASE ORAL
Status: DISCONTINUED | OUTPATIENT
Start: 2020-09-21 | End: 2020-09-23 | Stop reason: HOSPADM

## 2020-09-21 RX ORDER — PRAVASTATIN SODIUM 80 MG/1
80 TABLET ORAL
Status: DISCONTINUED | OUTPATIENT
Start: 2020-09-21 | End: 2020-09-23 | Stop reason: HOSPADM

## 2020-09-21 RX ORDER — ONDANSETRON 2 MG/ML
4 INJECTION INTRAMUSCULAR; INTRAVENOUS EVERY 6 HOURS PRN
Status: DISCONTINUED | OUTPATIENT
Start: 2020-09-21 | End: 2020-09-23 | Stop reason: HOSPADM

## 2020-09-21 RX ORDER — ACETAMINOPHEN 325 MG/1
650 TABLET ORAL EVERY 6 HOURS PRN
Status: DISCONTINUED | OUTPATIENT
Start: 2020-09-21 | End: 2020-09-23 | Stop reason: HOSPADM

## 2020-09-21 RX ORDER — LEVOTHYROXINE SODIUM 0.07 MG/1
75 TABLET ORAL
Status: DISCONTINUED | OUTPATIENT
Start: 2020-09-21 | End: 2020-09-23 | Stop reason: HOSPADM

## 2020-09-21 RX ORDER — MAGNESIUM CHLORIDE 64 MG
64 TABLET, DELAYED RELEASE (ENTERIC COATED) ORAL DAILY
Status: DISCONTINUED | OUTPATIENT
Start: 2020-09-21 | End: 2020-09-23 | Stop reason: HOSPADM

## 2020-09-21 RX ORDER — PANTOPRAZOLE SODIUM 40 MG/1
40 TABLET, DELAYED RELEASE ORAL
Status: DISCONTINUED | OUTPATIENT
Start: 2020-09-21 | End: 2020-09-21 | Stop reason: SDUPTHER

## 2020-09-21 RX ORDER — HEPARIN SODIUM 5000 [USP'U]/ML
5000 INJECTION, SOLUTION INTRAVENOUS; SUBCUTANEOUS EVERY 8 HOURS SCHEDULED
Status: DISCONTINUED | OUTPATIENT
Start: 2020-09-21 | End: 2020-09-23 | Stop reason: HOSPADM

## 2020-09-21 RX ORDER — SENNOSIDES 8.6 MG
2 TABLET ORAL DAILY PRN
Status: DISCONTINUED | OUTPATIENT
Start: 2020-09-21 | End: 2020-09-23 | Stop reason: HOSPADM

## 2020-09-21 RX ORDER — ASPIRIN 81 MG/1
81 TABLET, CHEWABLE ORAL DAILY
Status: DISCONTINUED | OUTPATIENT
Start: 2020-09-21 | End: 2020-09-23 | Stop reason: HOSPADM

## 2020-09-21 RX ADMIN — CYANOCOBALAMIN TAB 500 MCG 1000 MCG: 500 TAB at 08:30

## 2020-09-21 RX ADMIN — PRAVASTATIN SODIUM 80 MG: 80 TABLET ORAL at 17:05

## 2020-09-21 RX ADMIN — INSULIN LISPRO 2 UNITS: 100 INJECTION, SOLUTION INTRAVENOUS; SUBCUTANEOUS at 17:05

## 2020-09-21 RX ADMIN — DICLOFENAC SODIUM 2 G: 10 GEL TOPICAL at 13:01

## 2020-09-21 RX ADMIN — DICLOFENAC SODIUM 2 G: 10 GEL TOPICAL at 17:05

## 2020-09-21 RX ADMIN — MAGNESIUM 64 MG (MAGNESIUM CHLORIDE) TABLET,DELAYED RELEASE 64 MG: at 08:31

## 2020-09-21 RX ADMIN — AMLODIPINE BESYLATE 5 MG: 5 TABLET ORAL at 08:31

## 2020-09-21 RX ADMIN — HEPARIN SODIUM 5000 UNITS: 5000 INJECTION INTRAVENOUS; SUBCUTANEOUS at 13:00

## 2020-09-21 RX ADMIN — DICLOFENAC SODIUM 2 G: 10 GEL TOPICAL at 21:36

## 2020-09-21 RX ADMIN — DOCUSATE SODIUM 100 MG: 100 CAPSULE, LIQUID FILLED ORAL at 17:05

## 2020-09-21 RX ADMIN — ACETAMINOPHEN 650 MG: 325 TABLET, FILM COATED ORAL at 14:00

## 2020-09-21 RX ADMIN — INSULIN LISPRO 2 UNITS: 100 INJECTION, SOLUTION INTRAVENOUS; SUBCUTANEOUS at 21:36

## 2020-09-21 RX ADMIN — HEPARIN SODIUM 5000 UNITS: 5000 INJECTION INTRAVENOUS; SUBCUTANEOUS at 21:00

## 2020-09-21 RX ADMIN — LEVOTHYROXINE SODIUM 75 MCG: 75 TABLET ORAL at 05:04

## 2020-09-21 RX ADMIN — INSULIN LISPRO 1 UNITS: 100 INJECTION, SOLUTION INTRAVENOUS; SUBCUTANEOUS at 09:28

## 2020-09-21 RX ADMIN — BACITRACIN 1 SMALL APPLICATION: 500 OINTMENT TOPICAL at 08:31

## 2020-09-21 RX ADMIN — ASPIRIN 81 MG CHEWABLE TABLET 81 MG: 81 TABLET CHEWABLE at 08:30

## 2020-09-21 RX ADMIN — DOCUSATE SODIUM 100 MG: 100 CAPSULE, LIQUID FILLED ORAL at 08:31

## 2020-09-21 RX ADMIN — HEPARIN SODIUM 5000 UNITS: 5000 INJECTION INTRAVENOUS; SUBCUTANEOUS at 02:29

## 2020-09-21 RX ADMIN — INSULIN LISPRO 1 UNITS: 100 INJECTION, SOLUTION INTRAVENOUS; SUBCUTANEOUS at 11:54

## 2020-09-21 RX ADMIN — PANTOPRAZOLE SODIUM 40 MG: 40 TABLET, DELAYED RELEASE ORAL at 05:04

## 2020-09-21 RX ADMIN — BACITRACIN 1 SMALL APPLICATION: 500 OINTMENT TOPICAL at 17:05

## 2020-09-21 NOTE — H&P
H&P- Valmary lou Clink 1/3/1932, 80 y o  female MRN: [id number]    Unit/Bed#: ED 02 Encounter: [id number]    Primary Care Provider: Sandhya Robertson MD   Date and time admitted to hospital: 9/20/2020  7:44 PM        * Vasovagal syncope  Assessment & Plan  Patient came in with status post fall and incidental finding of low blood pressure and at the time that the incident of fall happened she might have passed out while she was picking up garbage  Patient given bolus of intravenous fluids and will check orthostatic blood pressures  Troponins elevated (may be secondary to chronic renal insufficiency as assessed by Cardiology in the past); and will check x3  Telemetry  Will recheck metabolic profile with CBC in the morning  Fall at home, subsequent encounter  Assessment & Plan  Physical therapy consult and case management referral     Elevated troponin  Assessment & Plan  Elevated troponin was assessed prior by cardiology and may be secondary to chronic renal insufficiency  No complaints of chest pain  However with a history of CAD, will trend x3  Hypertension  Assessment & Plan  Currently on metoprolol  Furosemide on hold for now  Bradyarrhythmia  Assessment & Plan  Patient's heart rate is somewhere along 48 to mid 46s  According to previous notes from Cardiology, the patient has longstanding sinus bradycardia with LBBB  As part of syncopal orders will place patient in telemetry (syncope with cardiac cause)  Metoprolol put on hold  Type 2 diabetes mellitus, without long-term current use of insulin [facility])  Assessment & Plan  Lab Results   Component Value Date    HGBA1C 6 9 (H) 02/16/2020     Will put on hold Januvia and Glucophage  Also check blood sugars before meals and before bedtime with insulin sliding scale    Recent Labs     09/20/20  2222   POCGLU 218*       Blood Sugar Average: Last 72 hrs:  (P) 218    CKD (chronic kidney disease)  Assessment & Plan  Although mildly elevated currently at 1 59 from previous 1 43, this has been within her limits  Current GFR at 29  Hypothyroidism  Assessment & Plan  Continue levothyroxine 75 mcg daily  Liver lesion, right lobe  Assessment & Plan  Incidental finding on CT scan (3 4 cm round subcapsular hypodense lesion in right lower lobe of liver incompletely characterize); would leave to dayteam whether they would like to get MRI  But current LFTs are unchanged from previous  VTE Prophylaxis: Heparin  / sequential compression device   Code Status: Prior do not resuscitate level 3 as discussed by ER physicians with daughter  POLST: There is no POLST form on file for this patient (pre-hospital)    Anticipated Length of Stay:  Patient will be admitted on an Inpatient basis with an anticipated length of stay of  greater than 2 midnights  Justification for Hospital Stay: Please see detailed plans noted above  May need placement? Chief Complaint:     Subsequent fall at home with possible syncopal event  Vasovagal question  History of Present Illness:  Eamon Zuniga is a 80 y o  female who has a past medical history significant for CAD and chronic elevated troponin which was assessed by Cardiology previously to be secondary to chronic renal disease  Likewise, she has a history of chronic sinus bradycardia with LBBB  The patient comes in because of status post accidental fall and a questionable syncopal episode  Patient states that she was picking up something from the floor when suddenly she must have blacked out and fell  According to the emergency room physician notes was that there was no loss of consciousness on the other hand subsequently when asked, she said that she might have blacked out  In any case the patient was on ground for approximately 45 minutes before able to get up  Currently she is complaining of right hand pain at the metacarpal of the second digit left hand  She also has some right neck pain    But currently she is lying flat on bed and communicative  During the time of evaluation she was found to also have a blood pressure of 74 systolic x1 with good resolution on fluid administration  She also denies any abdominal pain or chest pain or nausea vomiting  No diarrhea          Review of Systems:    Constitutional:  Denies fever or chills   Eyes:  Denies change in visual acuity   HENT:  Denies nasal congestion or sore throat   Respiratory:  Denies cough or shortness of breath   Cardiovascular:  Denies chest pain or edema   GI:  Denies abdominal pain, nausea, vomiting, bloody stools or diarrhea   :  Denies dysuria   Musculoskeletal:  Denies back pain or joint pain   Integument:  Denies rash   Neurologic:  Denies headache, focal weakness or sensory changes   Endocrine:  Denies polyuria or polydipsia   Lymphatic:  Denies swollen glands   Psychiatric:  Denies depression or anxiety     Past Medical and Surgical History:   Past Medical History:   Diagnosis Date    Acquired absence of kidney     Frequency of micturition     GERD (gastroesophageal reflux disease)     Hypertension     Malignant neoplasm of left kidney, except renal pelvis (HCC)     Nocturia     Personal history of other malignant neoplasm of kidney     Renal mass, left     UTI (urinary tract infection)      Past Surgical History:   Procedure Laterality Date    RADICAL NEPHRECTOMY Left 2013       Meds/Allergies:  (Not in a hospital admission)    metoprolol succinate (TOPROL-XL) 25 mg 24 hr tablet  Take 1 tablet by mouth daily  Historical Provider, MD  Needs Review    Reviewed Prior to Admission Medications     Medication  Details  Provider  Last Reconciliation Status    acetaminophen (TYLENOL) 325 mg tablet  Take 2 tablets (650 mg total) by mouth every 6 (six) hours as needed for mild pain, headaches or fever  Antoinette Cortes MD  Reordered    Ordered as: acetaminophen (TYLENOL) tablet 650 mg - 650 mg, Oral, Every 6 hours PRN, mild pain, headaches, fever, Starting Mon 9/21/20 at 0053    aspirin 81 MG tablet  Take 1 tablet by mouth daily  Curtis Dickinson MD  Reordered    Ordered as: aspirin chewable tablet 81 mg - 81 mg, Oral, Daily, First dose on Mon 9/21/20 at 0900    Aspirin-Calcium Carbonate  MG TABS  Take 1 tablet by mouth  Curtis Dickinson MD  Not Ordered    betamethasone valerate (VALISONE) 0 1 % ointment  APPLY TO THE AFFECTED AREA SMALL AMOUNT 1 TO 2 TIMES A WEEK FOR IRRITATION/ITCHING  Curtis Dickinson MD  Not Ordered    Cyanocobalamin (B-12) 1000 MCG CAPS  Take 1 tablet by mouth daily  Curtis Dickinson MD  Reordered    Ordered as: cyanocobalamin (VITAMIN B-12) tablet 1,000 mcg - 1,000 mcg, Oral, Daily, First dose on Mon 9/21/20 at 0900    Diclofenac Sodium  MG 24 hr tablet  Take by mouth Daily  Curtis Dickinson MD  Not Ordered    esomeprazole (NexIUM) 40 MG capsule  Take 1 capsule by mouth Daily  Curtis Dickinson MD  Reordered    Ordered as: pantoprazole (PROTONIX) EC tablet 40 mg - 40 mg, Oral, Daily (early morning), First dose on Mon 9/21/20 at 0600 Swallow whole; do not crush, chew or split  LOOK ALIKE SOUND ALIKE MED     furosemide (LASIX) 20 mg tablet  Take 20 mg by mouth  Curtis Dickinson MD  Not Ordered    levothyroxine 75 mcg tablet  Take 1 tablet by mouth daily  Curtis Dickinson MD  Reordered    Ordered as: levothyroxine tablet 75 mcg - 75 mcg, Oral, Daily, First dose on Mon 9/21/20 at 0900 Administer in the morning on an empty stomach, at least 30 to 60 minutes before food  Tablets may be crushed and suspended in 5-10mls of water for administration  LOOK ALIKE SOUND ALIKE MED      magnesium chloride (MAG64) 64 MG TBEC EC tablet  Take 1 tablet by mouth  Curtis Dickinson MD  Reordered    Ordered as: magnesium chloride (MAG64) EC tablet TBEC 64 mg - 64 mg, Oral, Daily, First dose on Mon 9/21/20 at 0900 Do not crush, chew, or split  Enteric coated       metFORMIN (GLUCOPHAGE-XR) 750 mg 24 hr tablet  TAKE 1 TABLET (750 MG TOTAL) BY MOUTH DAILY WITH BREAKFAST  Curtis Dickinson MD  Not Ordered    omeprazole (PRILOSEC) 20 mg delayed release capsule  Take 1 capsule by mouth daily  Curtis Dickinson MD  Reordered    Ordered as: pantoprazole (PROTONIX) EC tablet 40 mg - 40 mg, Oral, Daily (early morning), First dose on Mon 9/21/20 at 0600 Swallow whole; do not crush, chew or split  LOOK ALIKE SOUND ALIKE MED     simvastatin (ZOCOR) 40 mg tablet  Take 1 tablet by mouth daily  Curtis Dickinson MD  Reordered    Ordered as: pravastatin (PRAVACHOL) tablet 80 mg - 80 mg, Oral, Daily with dinner, First dose on Mon 9/21/20 at 1630    sitaGLIPtin (JANUVIA) 25 mg tablet  Take 25 mg by mouth daily  Curtis Dickinson MD           Allergies: Allergies   Allergen Reactions    Ace Inhibitors      Other reaction(s): Hypotension    Cephalexin     Clindamycin     Doxycycline     Erythromycin Base     Lactate     Levaquin [Levofloxacin]     Penicillins     Procainamide     Quinidine (Non-Therapeutic)     Sulfa Antibiotics      History:  Marital Status:     Occupation:  Currently retired  Patient Pre-hospital Living Situation:  Lives at home alone  Patient Pre-hospital Level of Mobility:  Ambulatory by self  Patient Pre-hospital Diet Restrictions:  Regular diet  Substance Use History:   Social History     Substance and Sexual Activity   Alcohol Use Not Currently     Social History     Tobacco Use   Smoking Status Never Smoker   Smokeless Tobacco Never Used     Social History     Substance and Sexual Activity   Drug Use Not Currently       Family History:  Family History   Problem Relation Age of Onset    Diabetes Father     Heart failure Brother        Physical Exam:     Vitals:   Blood Pressure: 145/61 (09/21/20 0000)  Pulse: (!) 54 (09/21/20 0000)  Temperature: (!) 97 3 °F (36 3 °C) (09/20/20 1949)  Temp Source: Oral (09/20/20 1949)  Respirations: 20 (09/21/20 0000)  Weight - Scale: 67 1 kg (148 lb) (09/20/20 2152)  SpO2: 99 % (09/21/20 0000)    Constitutional:  Well developed, well nourished, no acute distress, non-toxic appearance   Eyes:  PERRL, conjunctiva normal   HENT:  Atraumatic, external ears normal, nose normal, oropharynx moist, no pharyngeal exudates  Neck- normal range of motion, no tenderness, supple   Respiratory:  No respiratory distress, normal breath sounds, no rales, no wheezing   Cardiovascular:  Normal rate, normal rhythm, no murmurs, no gallops, no rubs   GI:  Soft, nondistended, normal bowel sounds, nontender, no organomegaly, no mass, no rebound, no guarding   :  No costovertebral angle tenderness   Musculoskeletal:  No edema, tenderness and bruising located at the metacarpal the second digit left hand; no deformities  Back- no tenderness  Integument:  Well hydrated, no rash   Lymphatic:  No lymphadenopathy noted   Neurologic:  Alert &awake, communicative, CN 2-12 normal, normal motor function, normal sensory function, no focal deficits noted   Psychiatric:  Speech and behavior appropriate       Lab Results: I have personally reviewed pertinent reports  Results from last 7 days   Lab Units 09/20/20  2231   WBC Thousand/uL 10 44*   HEMOGLOBIN g/dL 11 8   HEMATOCRIT % 38 3   PLATELETS Thousands/uL 271   NEUTROS PCT % 74   LYMPHS PCT % 14   MONOS PCT % 10   EOS PCT % 1     Results from last 7 days   Lab Units 09/20/20  2231   POTASSIUM mmol/L 5 0   CHLORIDE mmol/L 101   CO2 mmol/L 24   BUN mg/dL 38*   CREATININE mg/dL 1 59*   CALCIUM mg/dL 9 4   ALK PHOS U/L 125*   ALT U/L 11*   AST U/L 14           EKG:  Sinus bradycardia, incomplete LBBB with note of ST depression lateral leads, long lead 2 and aVL and long lead 1    Imaging: I have personally reviewed pertinent reports  Ct Chest Abdomen Pelvis Wo Contrast    Result Date: 9/21/2020  Narrative: CT CHEST, ABDOMEN AND PELVIS WITHOUT IV CONTRAST INDICATION:   hypotensive, fall  COMPARISON:  None   TECHNIQUE: CT examination of the chest, abdomen and pelvis was performed without intravenous contrast   Axial, sagittal, and coronal 2D reformatted images were created from the source data and submitted for interpretation  Radiation dose length product (DLP) for this visit:  886 86 mGy-cm   This examination, like all CT scans performed in the [facility], was performed utilizing techniques to minimize radiation dose exposure, including the use of iterative  reconstruction and automated exposure control  Enteric contrast was not administered  FINDINGS: CHEST LUNGS:  There are diffuse scattered groundglass nodules measuring less than 5 mm in the peripheral lungs which may be due to atypical infection  The central airways are patent  PLEURA:  Unremarkable  HEART/GREAT VESSELS:  Coronary artery calcifications  The heart is normal in size  No pericardial effusion  MEDIASTINUM AND MARISEL:  Unremarkable  CHEST WALL AND LOWER NECK:   Unremarkable  ABDOMEN Evaluation of intra-abdominal organs is limited to lack of IV contrast  Evaluation of the bowel is limited to lack of enteric contrast  LIVER/BILIARY TREE:  There is a 3 4 cm round subcapsular hypodense lesion in the right lobe liver (series 2, image 59) measuring higher than fluid density (33 Hounsfield units)  No biliary duct dilatation  GALLBLADDER:  There are gallstone(s) within the gallbladder, without pericholecystic inflammatory changes  SPLEEN:  Unremarkable  PANCREAS:  Atrophic pancreas with coarse calcifications in the tail likely due to sequela of chronic pancreatitis  No main pancreatic ductal dilatation  ADRENAL GLANDS:  Unremarkable  KIDNEYS/URETERS:  Left nephrectomy  There is a 8mm hyperdense exophytic lesion on the right kidney (series 601, image 76) which may represent a proteinaceous/hemorrhagic cyst   No hydronephrosis  STOMACH AND BOWEL:  An ingested pill is seen within the distal stomach  No bowel obstruction  Diverticulosis without evidence of diverticulitis  APPENDIX:  Status post appendectomy  ABDOMINOPELVIC CAVITY:  No ascites  No pneumoperitoneum  No lymphadenopathy  VESSELS:  Moderate atherosclerotic calcifications  PELVIS REPRODUCTIVE ORGANS:  Unremarkable for patient's age  URINARY BLADDER:  Unremarkable  ABDOMINAL WALL/INGUINAL REGIONS:  Unremarkable  OSSEOUS STRUCTURES:  No acute fracture or destructive osseous lesion  Degenerative changes of the osseous structures  Large anterolateral osteophytes in the lumbar spine  Bone cyst within the L3 vertebral body  Impression: 1  No acute traumatic injury identified within chest, abdomen and pelvis  2   Diffuse scattered groundglass nodules within the peripheral lungs which may be due to atypical infection  3   Coronary artery calcifications  4   3 4 cm round subcapsular hypodense lesion in the right lobe of the liver, incompletely characterized  Further evaluation with MRI of the abdomen is recommended  Workstation performed: [id number]     Ct Head Without Contrast    Result Date: 9/20/2020  Narrative: CT BRAIN - WITHOUT CONTRAST INDICATION:   fall, on asa  COMPARISON:  Brain CT from 2/15/2020  TECHNIQUE:  CT examination of the brain was performed  In addition to axial images, sagittal and coronal 2D reformatted images were created and submitted for interpretation  Radiation dose length product (DLP) for this visit:  783 29 mGy-cm   This examination, like all CT scans performed in the [facility], was performed utilizing techniques to minimize radiation dose exposure, including the use of iterative  reconstruction and automated exposure control  IMAGE QUALITY:  Diagnostic  FINDINGS: PARENCHYMA:  No intracranial mass, mass effect or midline shift  No CT signs of acute infarction  No acute parenchymal hemorrhage  There is an unchanged, chronic left frontoparietal lobe infarct (series 2 image 26 ) VENTRICLES AND EXTRA-AXIAL SPACES:  Normal for the patient's age  VISUALIZED ORBITS AND PARANASAL SINUSES:  Unremarkable   CALVARIUM AND EXTRACRANIAL SOFT TISSUES:  Normal      Impression: No acute intracranial abnormality  There is an unchanged, chronic left frontoparietal lobe infarct (series 2 image 26 ) Workstation performed: [id number]     Ct Spine Cervical Without Contrast    Result Date: 9/20/2020  Narrative: CT CERVICAL SPINE - WITHOUT CONTRAST INDICATION:   neck pain, fall  COMPARISON:  Cervical spine CT from 2/15/2020  TECHNIQUE:  CT examination of the cervical spine was performed without intravenous contrast   Contiguous axial images were obtained  Sagittal and coronal reconstructions were performed  Radiation dose length product (DLP) for this visit:  265 51 mGy-cm   This examination, like all CT scans performed in the [facility], was performed utilizing techniques to minimize radiation dose exposure, including the use of iterative  reconstruction and automated exposure control  IMAGE QUALITY:  Diagnostic  FINDINGS: ALIGNMENT:  Normal alignment of the cervical spine  No subluxation  VERTEBRAL BODIES:  No fracture  DEGENERATIVE CHANGES:  Again seen is DISH  There is also multilevel moderate degenerative disc disease  PREVERTEBRAL AND PARASPINAL SOFT TISSUES:  Unremarkable  THORACIC INLET:  Normal      Impression: No cervical spine fracture or traumatic malalignment  Workstation performed: [id number]         ** Please Note: Dragon 360 Dictation voice to text software was used in the creation of this document   **

## 2020-09-21 NOTE — PHYSICAL THERAPY NOTE
Physical Therapy Evaluation    Patient's Name: Tam Cormier    Admitting Diagnosis  Hypotension [I95 9]  Left wrist pain [M25 532]  Unspecified multiple injuries, initial encounter [T07  XXXA]    Problem List  Patient Active Problem List   Diagnosis    Cholelithiasis    Syncope and collapse    Hypothyroidism    CKD (chronic kidney disease)    Hyperlipidemia    Hypertension    Elevated troponin    CAD (coronary artery disease)    Type 2 diabetes mellitus, without long-term current use of insulin (HCC)    Weight loss    Fall at home, subsequent encounter    Chest pain    Liver lesion, right lobe       Past Medical History  Past Medical History:   Diagnosis Date    Acquired absence of kidney     Frequency of micturition     GERD (gastroesophageal reflux disease)     Hypertension     Malignant neoplasm of left kidney, except renal pelvis (HCC)     Nocturia     Personal history of other malignant neoplasm of kidney     Renal mass, left     UTI (urinary tract infection)        Past Surgical History  Past Surgical History:   Procedure Laterality Date    RADICAL NEPHRECTOMY Left 2013 09/21/20 0846   PT Last Visit   PT Visit Date 09/21/20   Note Type   Note type Eval only   Pain Assessment   Pain Assessment Tool 0-10   Pain Score 5   Pain Location/Orientation Orientation: Left  (hand)   Hospital Pain Intervention(s) Repositioned; Ambulation/increased activity   Home Living   Type of [address] [place] [address] One level;Stairs to enter with rails  (3 YVES 1 HR)   Prior Function   Level of [place] Independent with ADLs and functional mobility   Lives With Alone   ADL Assistance Independent   IADLs Independent   Falls in the last 6 months 5 to 10  (6-7 falls)   Comments Pt reports no AD at baseline, has 2 walkers and 3 canes, but only uses them "when I need to" or on "bad days "  Pt reports falls have occurred inside and outside while performing yardwork  States she is very active    Leading to this admission, pt with 1 mechanical fall while picking up a twig to put in the garbage  Pt was discharging from ED awaiting granddaughter arrival, documented episode of staring off, increased assistance of 2 people to move from bed to chair, hypotensive  Pt admitted  Restrictions/Precautions   Weight Bearing Precautions Per Order No   Other Precautions Chair Alarm;Cognitive; Bed Alarm; Fall Risk;Pain   General   Family/Caregiver Present No   Cognition   Overall Cognitive Status Impaired   Arousal/Participation Alert   Orientation Level Oriented X4   Memory Within functional limits   Following Commands Follows multistep commands with increased time or repetition   Comments Pt easily distracted, poor insight to impairments and safety  RUE Assessment   RUE Assessment WFL   LUE Assessment   LUE Assessment X  (L  strength < R  strength, all ROM WFL)   RLE Assessment   RLE Assessment WFL  (Grossly 4/5)   LLE Assessment   LLE Assessment WFL  (Grossly 4/5)   Light Touch   RLE Light Touch Grossly intact   LLE Light Touch Grossly intact   Bed Mobility   Additional Comments Pt sitting at EOB upon PT arrival    Transfers   Sit to Stand 5  Supervision   Stand to Sit 5  Supervision   Additional Comments with RW   Ambulation/Elevation   Gait pattern Decreased foot clearance; Foward flexed   Gait Assistance 5  Supervision   Additional items Assist x 1   Assistive Device Rolling walker   Distance 30 ft, improved balance with use of AD, unsteadiness with rotational turns  /68 after transfer to chair, denies orthostatic symptoms throughout session  Stair Management Assistance Not tested   Balance   Static Sitting Good   Dynamic Sitting Fair   Static Standing Fair -   Dynamic Standing Fair -   Ambulatory Poor +   Activity Tolerance   Activity Tolerance Patient limited by fatigue   Nurse Made Aware RN updated   Chair alarm intact   Assessment   Prognosis Good   Problem List Decreased strength;Decreased endurance; Impaired balance;Decreased mobility; Decreased cognition;Decreased safety awareness   Assessment Pt is a 80 y o  female seen for PT evaluation s/p admit to [facility] on 9/20/2020  Pt was admitted with a primary dx of: vasovagal syncope, fall  PT now consulted for assessment of mobility and d/c needs  Pt with Up with assistance orders  Pts current comorbidities effecting treatment include: HTN, Bradycardia, L BBB, DM, CKD, CAD  Pts current clinical presentation is Unstable/ Unpredictable (high complexity) due to Ongoing medical management for primary dx, Increased reliance on more restrictive AD compared to baseline, Decreased activity tolerance compared to baseline, Fall risk, Cog status  Prior to admission, pt was independent with all household mobility, does not utilize AD, drives  Upon evaluation, pt currently is requiring supervision for transfers and supervision for ambulation 30 ft w/ RW  Pt presents at PT eval functioning below baseline and currently w/ overall mobility deficits 2* to: BLE weakness, impaired balance, decreased endurance, pain, decreased activity tolerance compared to baseline, decreased functional mobility tolerance compared to baseline, decreased safety awareness, impaired judgement, fall risk, decreased cognition  Pt currently at a fall risk 2* to impairments listed above  Pt will continue to benefit from skilled acute PT interventions to address stated impairments; to maximize functional mobility; for ongoing pt/ family training; and DME needs  At conclusion of PT session pt returned back in chair and chair alarm engaged with phone and call bell within reach  Pt denies any further questions at this time  Pt reports 6 -7 recent falls, resides alone, has a granddaughter near by, but is unable to assist often as she has young children  Pt is a high fall risk with poor safety awareness  Recommend IP rehab upon hospital D/C     Barriers to Discharge Decreased caregiver support   Goals   Patient Goals "to go home"   [facility] Expiration Date 10/01/20   Short Term Goal #1 In 10 days pt will be able to: 1  Demonstrate ability to perform all aspects of bed mobility independently to increase functional independence  2  Perform functional transfers with LRAD independently to facilitate safe return to previous living environment  3   Ambulate 150 ft with LRAD independently with stable vitals to improve safety with household distances and reduce fall risk  4  Climb 3 steps independently with 1 HR to simulate entrance to home  5  Improve LE strength grades by 1 to increase ease of functional mobility with transfers and gait  6  Pt will demonstrate improved balance by one grade order to decrease risk of falls  PT Treatment Day 0   Plan   Treatment/Interventions Functional transfer training;LE strengthening/ROM; Elevations; Therapeutic exercise; Endurance training;Cognitive reorientation;Patient/family training;Equipment eval/education;Gait training;Bed mobility   PT Frequency Other (Comment)  (3-5x/week)   Recommendation   PT Discharge Recommendation Post-Acute Rehabilitation Services   PT - OK to Discharge Yes  (to IP rehab)   Barthel Index   Feeding 10   Bathing 0   Grooming Score 5   Dressing Score 5   Bladder Score 10   Bowels Score 10   Toilet Use Score 5   Transfers (Bed/Chair) Score 10   Mobility (Level Surface) Score 0   Stairs Score 0   Barthel Index Score 55       Magnolia Hernandez, PT, DPT

## 2020-09-21 NOTE — PROGRESS NOTES
Progress Note - Jaspal Mg 1/3/1932, 80 y o  female MRN: [id number]    [facility]: [id number]    Primary Care Provider: Eric Ly MD   Date and time admitted to hospital: 9/20/2020  7:44 PM        * Fall at home, subsequent encounter  Assessment & Plan  · Pt/OT  · Supportive care  · Fall precautions    Elevated troponin  Assessment & Plan  Elevated troponin was assessed prior by cardiology and may be secondary to chronic renal insufficiency  No complaints of chest pain  However with a history of CAD, will trend x3  Syncope and collapse  Assessment & Plan  · Syncope in the ED just prior to discharge  · troponins increased  · Denies CP, palpitations or SOB  · intermittent Bradycardia on telemetry  · Defer heparin at this time due no CP or SOB  · Consult with cardiology for possible pacer or stress/cath    Liver lesion, right lobe  Assessment & Plan  Incidental finding on CT scan (3 4 cm round subcapsular hypodense lesion in right lower lobe of liver incompletely characterize); Outpatient MRI when stable    Type 2 diabetes mellitus, without long-term current use of insulin [facility])  Assessment & Plan  Lab Results   Component Value Date    HGBA1C 6 9 (H) 02/16/2020     Will put on hold Januvia and Glucophage  Also check blood sugars before meals and before bedtime with insulin sliding scale  Recent Labs     09/20/20  2222 09/21/20  0829 09/21/20  1154   POCGLU 218* 174* 189*       Blood Sugar Average: Last 72 hrs:  (P) 937 [id number]    Hypertension  Assessment & Plan  Currently on metoprolol  Furosemide on hold for now  CKD (chronic kidney disease)  Assessment & Plan  Although mildly elevated currently at 1 59 from previous 1 43, this has been within her limits  Current GFR at 29  Hypothyroidism  Assessment & Plan  Continue levothyroxine 75 mcg daily          VTE Pharmacologic Prophylaxis:   Pharmacologic: Heparin  Mechanical VTE Prophylaxis in Place: Yes    Patient Centered Rounds: I have performed bedside rounds with nursing staff today  Discussions with Specialists or Other Care Team Provider: cardiology    Education and Discussions with Family / Patient: patient, plan of care    Time Spent for Care: 20 minutes  More than 50% of total time spent on counseling and coordination of care as described above  Current Length of Stay: 0 day(s)    Current Patient Status: Inpatient   Certification Statement: The patient will continue to require additional inpatient hospital stay due to cardiology evaluation    Discharge Plan: TBD    Code Status: Level 3 - DNAR and DNI      Subjective:   Reports pain in left wrist  Denies CP, SOB, palpitations  Objective:     Vitals:   Temp (24hrs), Av 5 °F (36 4 °C), Min:97 3 °F (36 3 °C), Max:98 1 °F (36 7 °C)    Temp:  [97 3 °F (36 3 °C)-98 1 °F (36 7 °C)] 98 1 °F (36 7 °C)  HR:  [44-82] 46  Resp:  [14-25] 18  BP: ()/() 170/125  SpO2:  [96 %-100 %] 99 %  Body mass index is 26 87 kg/m²  Input and Output Summary (last 24 hours):     No intake or output data in the 24 hours ending 20 1319    Physical Exam:     Physical Exam  Constitutional:       Appearance: Normal appearance  HENT:      Head: Normocephalic  Right Ear: Tympanic membrane normal       Left Ear: Tympanic membrane normal       Mouth/Throat:      Mouth: Mucous membranes are moist    Eyes:      Extraocular Movements: Extraocular movements intact  Pupils: Pupils are equal, round, and reactive to light  Cardiovascular:      Rate and Rhythm: Normal rate and regular rhythm  Pulmonary:      Breath sounds: No wheezing or rales  Abdominal:      General: Abdomen is flat  Palpations: Abdomen is soft  Skin:     Comments: echhymosis   Neurological:      General: No focal deficit present  Mental Status: She is alert and oriented to person, place, and time     Psychiatric:         Mood and Affect: Mood normal  Behavior: Behavior normal            Additional Data:     Labs:    Results from last 7 days   Lab Units 09/21/20  0904 09/21/20  0503 09/20/20  2231   WBC Thousand/uL  --  10 22* 10 44*   HEMOGLOBIN g/dL  --  11 4* 11 8   HEMATOCRIT %  --  36 1 38 3   PLATELETS Thousands/uL 282 266 271   NEUTROS PCT %  --   --  74   LYMPHS PCT %  --   --  14   MONOS PCT %  --   --  10   EOS PCT %  --   --  1     Results from last 7 days   Lab Units 09/21/20  0503   SODIUM mmol/L 136   POTASSIUM mmol/L 4 7   CHLORIDE mmol/L 103   CO2 mmol/L 26   BUN mg/dL 36*   CREATININE mg/dL 1 41*   ANION GAP mmol/L 7   CALCIUM mg/dL 9 1   ALBUMIN g/dL 3 5   TOTAL BILIRUBIN mg/dL 0 41   ALK PHOS U/L 116   ALT U/L 13   AST U/L 14   GLUCOSE RANDOM mg/dL 168*         Results from last 7 days   Lab Units 09/21/20  1154 09/21/20  0829 09/20/20  2222   POC GLUCOSE mg/dl 189* 174* 218*                   * I Have Reviewed All Lab Data Listed Above  * Additional Pertinent Lab Tests Reviewed:  All Labs Within Last 24 Hours Reviewed      Recent Cultures (last 7 days):           Last 24 Hours Medication List:   Current Facility-Administered Medications   Medication Dose Route Frequency Provider Last Rate    acetaminophen  650 mg Oral Q6H PRN Bonny Ferreira MD      aluminum-magnesium hydroxide-simethicone  15 mL Oral Q6H PRN Bonny Ferreira MD      aspirin  81 mg Oral Daily Bonny Ferreira MD      bacitracin  1 small application Topical BID Bonny Ferreira MD      cyanocobalamin  1,000 mcg Oral Daily Bonny Ferreira MD      diclofenac sodium  2 g Topical 4x Daily Randi Lopez MD      docusate sodium  100 mg Oral BID Randi Lopez MD      heparin (porcine)  5,000 Units Subcutaneous [facility] Bonny Ferreira MD      insulin lispro  1-5 Units Subcutaneous TID AC Randi Lopez MD      insulin lispro  1-5 Units Subcutaneous HS Randi Lopez MD      levothyroxine  75 mcg Oral Early Morning Bonny Ferreira MD      magnesium chloride  64 mg Oral Daily Ramiro Blank MD      ondansetron  4 mg Intravenous Q6H PRN Ramiro Blank MD      pantoprazole  40 mg Oral Early Morning Ramiro Blank MD      pravastatin  80 mg Oral Daily With Bakari Levine MD      senna  2 tablet Oral Daily PRN Ron Enciso MD          Today, Patient Was Seen By: Nina Fletcher MD    ** Please Note: Dictation voice to text software may have been used in the creation of this document   **

## 2020-09-21 NOTE — ASSESSMENT & PLAN NOTE
· Syncope in the ED just prior to discharge  · troponins increased  · Denies CP, palpitations or SOB  · intermittent Bradycardia on telemetry    · Defer heparin at this time due no CP or SOB  · Consult with cardiology for possible pacer or stress/cath

## 2020-09-21 NOTE — ED NOTES
Pt had episode of hypotension, Doctor Samina Crespo at bedside, EKG repeated, 1 L nss hung   Pt mentating without issue the entire time     Orville Lesches, RN  09/20/20 0214

## 2020-09-21 NOTE — ED ATTENDING ATTESTATION
9/20/2020  I, Irving Zamorano MD, saw and evaluated the patient  I have discussed the patient with the resident/non-physician practitioner and agree with the resident's/non-physician practitioner's findings, Plan of Care, and MDM as documented in the resident's/non-physician practitioner's note, except where noted  All available labs and Radiology studies were reviewed  I was present for key portions of any procedure(s) performed by the resident/non-physician practitioner and I was immediately available to provide assistance  At this point I agree with the current assessment done in the Emergency Department  I have conducted an independent evaluation of this patient a history and physical is as follows:    ED Course    patient is an 80-year-old female status post mechanical fall with head strike on aspirin made a level C trauma prior to arrival who complains of left wrist pain, neck pain as well as abrasions to her face  She denies any chest back or abdominal pain at this time  Denies any focal weakness numbness patient ambulates with a walker at baseline  Primary survey intact Oh coma Scale is 15 secondary survey is remarkable for abrasions to the anterior forehead and bridge of nose  There is no bony point tenderness over the bones of the face  Patient has mild pain with neck with rotation or movement no CT or L-spine tenderness to palpation  Heart is regular rate rhythm without murmurs lungs are clear to auscultation bilaterally chest wall is nontender no crepitus no ecchymosis  Pelvis stable to rock abdomen soft nontender nondistended normal bowel sounds no signs of peritonitis  Extremities are warm well perfused palpable pulses no bony point tenderness over lower extremities bilaterally  [place]   Upper extremities:  Patient has remained point tenderness over the radial aspect of her left wrist   Wrist joint full range of motion minimal soft tissue swelling no ecchymosis bilateral upper extremities warm well perfused  Neurovascular intact    Impression followed head strike will check CT head C-spine and left wrist x-ray        Initial imaging reviewed no evidence of acute traumatic injury  Patient ambulated emergency depart without difficulty  Request discharged home will follow-up PCP as outpatient  While awaiting discharge instructions, patient noted to become symptomatically dizzy with standing and ambulation  Patient was immediately assisted back to bed by nursing staff  Further discussion with patient given new symptoms and concerns for possible repeat recurrent falls patient was offered admission for which she initially declined admission  After discussion with her and her granddaughter patient was amenable to staying in the hospital given concerns for possible repeat falls as well as dizziness  Will check ECG screening labs sent  Called to patient's bedside patient noted to be bradycardic to the 40s and hypotensive patient was started on normal saline bolus on a pressure bag blood pressure heart rate improved  Patient taken to CT scan for CT chest and pelvis to exclude any possible traumatic injuries to explain hypotension  Patient's blood pressure remained normal after saline bolus  Case was discussed with Trauma plan to admit to Medicine no acute traumatic injuries are identified on CT imaging  Code status discussed with patient she is a level 3 DNR         Critical Care Time  Procedures

## 2020-09-21 NOTE — ASSESSMENT & PLAN NOTE
Lab Results   Component Value Date    HGBA1C 6 9 (H) 02/16/2020     Will put on hold Januvia and Glucophage  Also check blood sugars before meals and before bedtime with insulin sliding scale    Recent Labs     09/20/20  2222   POCGLU 218*       Blood Sugar Average: Last 72 hrs:  (P) 218

## 2020-09-21 NOTE — ASSESSMENT & PLAN NOTE
Although mildly elevated currently at 1 59 from previous 1 43, this has been within her limits  Current GFR at 29

## 2020-09-21 NOTE — PLAN OF CARE
Problem: Potential for Falls  Goal: Patient will remain free of falls  Description: INTERVENTIONS:  - Assess patient frequently for physical needs  -  Identify cognitive and physical deficits and behaviors that affect risk of falls    -  [place] fall precautions as indicated by assessment   - Educate patient/family on patient safety including physical limitations  - Instruct patient to call for assistance with activity based on assessment  - Modify environment to reduce risk of injury  - Consider OT/PT consult to assist with strengthening/mobility  Outcome: Progressing     Problem: Prexisting or High Potential for Compromised Skin Integrity  Goal: Skin integrity is maintained or improved  Description: INTERVENTIONS:  - Identify patients at risk for skin breakdown  - Assess and monitor skin integrity  - Assess and monitor nutrition and hydration status  - Monitor labs   - Assess for incontinence   - Turn and reposition patient  - Assist with mobility/ambulation  - Relieve pressure over bony prominences  - Avoid friction and shearing  - Provide appropriate hygiene as needed including keeping skin clean and dry  - Evaluate need for skin moisturizer/barrier cream  - Collaborate with interdisciplinary team   - Patient/family teaching  - Consider wound care consult   Outcome: Progressing     Problem: CARDIOVASCULAR - ADULT  Goal: Maintains optimal cardiac output and hemodynamic stability  Description: INTERVENTIONS:  - Monitor I/O, vital signs and rhythm  - Monitor for S/S and trends of decreased cardiac output  - Administer and titrate ordered vasoactive medications to optimize hemodynamic stability  - Assess quality of pulses, skin color and temperature  - Assess for signs of decreased coronary artery perfusion  - Instruct patient to report change in severity of symptoms  Outcome: Progressing  Goal: Absence of cardiac dysrhythmias or at baseline rhythm  Description: INTERVENTIONS:  - Continuous cardiac monitoring, vital signs, obtain 12 lead EKG if ordered  - Administer antiarrhythmic and heart rate control medications as ordered  - Monitor electrolytes and administer replacement therapy as ordered  Outcome: Progressing     Problem: SKIN/TISSUE INTEGRITY - ADULT  Goal: Skin integrity remains intact  Description: INTERVENTIONS  - Identify patients at risk for skin breakdown  - Assess and monitor skin integrity  - Assess and monitor nutrition and hydration status  - Monitor labs (i e  albumin)  - Assess for incontinence   - Turn and reposition patient  - Assist with mobility/ambulation  - Relieve pressure over bony prominences  - Avoid friction and shearing  - Provide appropriate hygiene as needed including keeping skin clean and dry  - Evaluate need for skin moisturizer/barrier cream  - Collaborate with interdisciplinary team (i e  Nutrition, Rehabilitation, etc )   - Patient/family teaching  Outcome: Progressing     Problem: MUSCULOSKELETAL - ADULT  Goal: Maintain or return mobility to safest level of function  Description: INTERVENTIONS:  - Assess patient's ability to carry out ADLs; assess patient's baseline for ADL function and identify physical deficits which impact ability to perform ADLs (bathing, care of mouth/teeth, toileting, grooming, dressing, etc )  - Assess/evaluate cause of self-care deficits   - Assess range of motion  - Assess patient's mobility  - Assess patient's need for assistive devices and provide as appropriate  - Encourage maximum independence but intervene and supervise when necessary  - Involve family in performance of ADLs  - Assess for home care needs following discharge   - Consider OT consult to assist with ADL evaluation and planning for discharge  - Provide patient education as appropriate  Outcome: Progressing     Problem: Nutrition/Hydration-ADULT  Goal: Nutrient/Hydration intake appropriate for improving, restoring or maintaining nutritional needs  Description: Monitor and assess patient's nutrition/hydration status for malnutrition  Collaborate with interdisciplinary team and initiate plan and interventions as ordered  Monitor patient's weight and dietary intake as ordered or per policy  Utilize nutrition screening tool and intervene as necessary  Determine patient's food preferences and provide high-protein, high-caloric foods as appropriate       INTERVENTIONS:  - Monitor oral intake, urinary output, labs, and treatment plans  - Assess nutrition and hydration status and recommend course of action  - Evaluate amount of meals eaten  - Assist patient with eating if necessary   - Allow adequate time for meals  - Recommend/ encourage appropriate diets, oral nutritional supplements, and vitamin/mineral supplements  - Order, calculate, and assess calorie counts as needed  - Recommend, monitor, and adjust tube feedings and TPN/PPN based on assessed needs  - Assess need for intravenous fluids  - Provide specific nutrition/hydration education as appropriate  - Include patient/family/caregiver in decisions related to nutrition  Outcome: Progressing

## 2020-09-21 NOTE — PLAN OF CARE
Problem: Potential for Falls  Goal: Patient will remain free of falls  Description: INTERVENTIONS:  - Assess patient frequently for physical needs  -  Identify cognitive and physical deficits and behaviors that affect risk of falls    -  [place] fall precautions as indicated by assessment   - Educate patient/family on patient safety including physical limitations  - Instruct patient to call for assistance with activity based on assessment  - Modify environment to reduce risk of injury  - Consider OT/PT consult to assist with strengthening/mobility  Outcome: Progressing     Problem: Prexisting or High Potential for Compromised Skin Integrity  Goal: Skin integrity is maintained or improved  Description: INTERVENTIONS:  - Identify patients at risk for skin breakdown  - Assess and monitor skin integrity  - Assess and monitor nutrition and hydration status  - Monitor labs   - Assess for incontinence   - Turn and reposition patient  - Assist with mobility/ambulation  - Relieve pressure over bony prominences  - Avoid friction and shearing  - Provide appropriate hygiene as needed including keeping skin clean and dry  - Evaluate need for skin moisturizer/barrier cream  - Collaborate with interdisciplinary team   - Patient/family teaching  - Consider wound care consult   Outcome: Progressing     Problem: CARDIOVASCULAR - ADULT  Goal: Maintains optimal cardiac output and hemodynamic stability  Description: INTERVENTIONS:  - Monitor I/O, vital signs and rhythm  - Monitor for S/S and trends of decreased cardiac output  - Administer and titrate ordered vasoactive medications to optimize hemodynamic stability  - Assess quality of pulses, skin color and temperature  - Assess for signs of decreased coronary artery perfusion  - Instruct patient to report change in severity of symptoms  Outcome: Progressing  Goal: Absence of cardiac dysrhythmias or at baseline rhythm  Description: INTERVENTIONS:  - Continuous cardiac monitoring, vital signs, obtain 12 lead EKG if ordered  - Administer antiarrhythmic and heart rate control medications as ordered  - Monitor electrolytes and administer replacement therapy as ordered  Outcome: Progressing     Problem: SKIN/TISSUE INTEGRITY - ADULT  Goal: Skin integrity remains intact  Description: INTERVENTIONS  - Identify patients at risk for skin breakdown  - Assess and monitor skin integrity  - Assess and monitor nutrition and hydration status  - Monitor labs (i e  albumin)  - Assess for incontinence   - Turn and reposition patient  - Assist with mobility/ambulation  - Relieve pressure over bony prominences  - Avoid friction and shearing  - Provide appropriate hygiene as needed including keeping skin clean and dry  - Evaluate need for skin moisturizer/barrier cream  - Collaborate with interdisciplinary team (i e  Nutrition, Rehabilitation, etc )   - Patient/family teaching  Outcome: Progressing     Problem: MUSCULOSKELETAL - ADULT  Goal: Maintain or return mobility to safest level of function  Description: INTERVENTIONS:  - Assess patient's ability to carry out ADLs; assess patient's baseline for ADL function and identify physical deficits which impact ability to perform ADLs (bathing, care of mouth/teeth, toileting, grooming, dressing, etc )  - Assess/evaluate cause of self-care deficits   - Assess range of motion  - Assess patient's mobility  - Assess patient's need for assistive devices and provide as appropriate  - Encourage maximum independence but intervene and supervise when necessary  - Involve family in performance of ADLs  - Assess for home care needs following discharge   - Consider OT consult to assist with ADL evaluation and planning for discharge  - Provide patient education as appropriate  Outcome: Progressing

## 2020-09-21 NOTE — ASSESSMENT & PLAN NOTE
Incidental finding on CT scan (3 4 cm round subcapsular hypodense lesion in right lower lobe of liver incompletely characterize); would leave to dayteam whether they would like to get MRI  But current LFTs are unchanged from previous

## 2020-09-21 NOTE — ASSESSMENT & PLAN NOTE
Lab Results   Component Value Date    HGBA1C 6 9 (H) 02/16/2020     Will put on hold Januvia and Glucophage  Also check blood sugars before meals and before bedtime with insulin sliding scale    Recent Labs     09/20/20 2222 09/21/20  0829 09/21/20  1154   POCGLU 218* 174* 189*       Blood Sugar Average: Last 72 hrs:  (P) 099 [id number]

## 2020-09-21 NOTE — UTILIZATION REVIEW
Initial Clinical Review    Admission: Date/Time/Statement:   Admission Orders (From admission, onward)     Ordered        09/21/20 0101  Inpatient Admission  Once                   Orders Placed This Encounter   Procedures    Inpatient Admission     Standing Status:   Standing     Number of Occurrences:   1     Order Specific Question:   Admitting Physician     Answer:   Ronaldo Davidson [[id number]]     Order Specific Question:   Level of Care     Answer:   Med Surg [[id number]]     Order Specific Question:   Estimated length of stay     Answer:   More than 2 Midnights     Order Specific Question:   Certification     Answer:   I certify that inpatient services are medically necessary for this patient for a duration of greater than two midnights  See H&P and MD Progress Notes for additional information about the patient's course of treatment  ED Arrival Information     Expected Arrival Acuity Means of Arrival Escorted By Service Admission Type    - 9/20/2020 19:43 [facility] [place] Hospitalist Emergency    Arrival Complaint    fall        Chief Complaint   Patient presents with    Fall     trauma level c - fall on aspirin 81mg hitting face on macadam     Assessment/Plan:   Mrs Liu Rowley is an 81 yo female who presents to the ED via EMS from home after a fall and questionable syncopal event  She was picking something up on floor then blacked out and fell  There is confusion if pt actually had a LOC  She was on the floor for approx  45 min  She is c/o R hand pain and at 2nd metacarpal of L hand and R neck pain  She was hypotensive in ED but responded well to fluid resuscitation  No other complaints of pain or other symptoms  PMH: CAD, chronic elevated troponin which was assessed previously to found to be r/t CRD, CKD, Hypothyroidism, chronic sinus bradycardia with LBBB  She is admitted to INPATIENT status with Vasovagal Syncope - IV fluids, orthostatics  Fall at home - PT/OT evals  Elevated troponin - trend x 3 (but this is chronic)  HTN - hold Lasix for now  Bradyarrhythmia - Metoprolol on hold, tele  IDDM - SSI cover  Incidental R lobe liver lesion - possible MRI, LFTs unchanged from previous        ED Triage Vitals   Temperature Pulse Respirations Blood Pressure SpO2   09/20/20 1949 09/20/20 1949 09/20/20 1949 09/20/20 1949 09/20/20 1949   (!) 97 3 °F (36 3 °C) 82 18 169/81 96 %      Temp Source Heart Rate Source Patient Position - Orthostatic VS BP Location FiO2 (%)   09/20/20 1949 09/20/20 1949 09/20/20 1949 -- --   Oral Monitor Lying        Pain Score       09/20/20 2051       7          Wt Readings from Last 1 Encounters:   09/21/20 68 8 kg (151 lb 10 8 oz)     Additional Vital Signs:   09/21/20 07:54:30   98 1 °F (36 7 °C)   --   18   170/125Abnormal     140   --   --   --    09/21/20 02:02:32   --   --   --   153/85   108   --   --   Standing - Orthostatic VS    09/21/20 02:00:46   97 3 °F (36 3 °C)Abnormal     --   16   149/85   106   --   --   Sitting - Orthostatic VS    09/21/20 01:59:42   97 3 °F (36 3 °C)Abnormal     --   --   152/63   93   --   --   Lying - Orthostatic VS    09/21/20 0130   --   46Abnormal     21   148/66   --   100 %   --   --    09/21/20 0000   --   54Abnormal     20   145/61   88   99 %   --   --    09/20/20 2330   --   50Abnormal     25Abnormal     142/65   94   100 %   --   --    09/20/20 2320   --   44Abnormal     21   71/39Abnormal     51   100 %   --   --    09/20/20 2315   --   46Abnormal     22   74/36Abnormal     50   100 %   --   --    09/20/20 2215   --   58   14   140/63   91   99 %   None (Room air)   --    09/20/20 2130   --   60   --   138/82   105   99 %   --   --    09/20/20 2100   --   70   20   135/70   --   99 %   None (Room air)   --    09/20/20 2045   --   74   16   137/64   92   99 %   --   --    09/20/20 2015   --   76   18   160/104Abnormal     126   97 %   --   --    09/20/20 2000   --   76   18   154/67   --   98 %   None (Room air)   --      Pertinent Labs/Diagnostic Test Results:     9/20 CT head - No acute intracranial abnormality  There is an unchanged, chronic left frontoparietal lobe infarct        9/20 CT C spine, Xray L wrist, xray L hand - no acute injury/disease  9/21 CT chest, abd, pelvis -    1  No acute traumatic injury identified within chest, abdomen and pelvis  2   Diffuse scattered groundglass nodules within the peripheral lungs which may be due to atypical infection  3   Coronary artery calcifications  4   3 4 cm round subcapsular hypodense lesion in the right lobe of the liver, incompletely characterized  Further evaluation with MRI of the abdomen is recommended        Results from last 7 days   Lab Units 09/21/20  0904 09/21/20  0503 09/20/20  2231   WBC Thousand/uL  --  10 22* 10 44*   HEMOGLOBIN g/dL  --  11 4* 11 8   HEMATOCRIT %  --  36 1 38 3   PLATELETS Thousands/uL 282 266 271   NEUTROS ABS Thousands/µL  --   --  7 80*         Results from last 7 days   Lab Units 09/21/20  0503 09/20/20  2231   SODIUM mmol/L 136 133*   POTASSIUM mmol/L 4 7 5 0   CHLORIDE mmol/L 103 101   CO2 mmol/L 26 24   ANION GAP mmol/L 7 8   BUN mg/dL 36* 38*   CREATININE mg/dL 1 41* 1 59*   EGFR ml/min/1 73sq m 33 29   CALCIUM mg/dL 9 1 9 4   MAGNESIUM mg/dL 1 7  --    PHOSPHORUS mg/dL 3 2  --      Results from last 7 days   Lab Units 09/21/20  0503 09/20/20  2231   AST U/L 14 14   ALT U/L 13 11*   ALK PHOS U/L 116 125*   TOTAL PROTEIN g/dL 7 2 7 4   ALBUMIN g/dL 3 5 3 6   TOTAL BILIRUBIN mg/dL 0 41 0 24     Results from last 7 days   Lab Units 09/20/20  2222   POC GLUCOSE mg/dl 218*     Results from last 7 days   Lab Units 09/21/20  0503 09/20/20  2231   GLUCOSE RANDOM mg/dL 168* 211*     Results from last 7 days   Lab Units 09/21/20  0904 09/21/20  0503 09/21/20  0230 09/20/20  2231   TROPONIN I ng/mL 0 23* 0 32* 0 30 0 14*     Results from last 7 days   Lab Units 09/21/20  0503   TSH 3RD GENERATON uIU/mL 1 290     ED Treatment: Medication Administration from 09/20/2020 1943 to 09/21/2020 0143    Date/Time Order Dose Route Action   09/20/2020 2051 acetaminophen (TYLENOL) tablet 975 mg 975 mg Oral Given   09/20/2020 2148 bacitracin topical ointment 1 small application 1 small application Topical Given   09/20/2020 2315 sodium chloride 0 9 % bolus 1,000 mL 1,000 mL Intravenous New Bag        Past Medical History:   Diagnosis Date    Acquired absence of kidney     Frequency of micturition     GERD (gastroesophageal reflux disease)     Hypertension     Malignant neoplasm of left kidney, except renal pelvis (HCC)     Nocturia     Personal history of other malignant neoplasm of kidney     Renal mass, left     UTI (urinary tract infection)      Present on Admission:   Bradyarrhythmia   Hypothyroidism   CKD (chronic kidney disease)   Hypertension   Elevated troponin   Type 2 diabetes mellitus, without long-term current use of insulin (HCC)    Admitting Diagnosis: Hypotension [I95 9]  Left wrist pain [M25 532]  Fall, initial encounter [W19  XXXA]  Unspecified multiple injuries, initial encounter [T07  XXXA]     Age/Sex: 80 y o  female     Admission Orders:    Scheduled Medications:  aspirin, 81 mg, Oral, Daily  bacitracin, 1 small application, Topical, BID  cyanocobalamin, 1,000 mcg, Oral, Daily  docusate sodium, 100 mg, Oral, BID  heparin (porcine), 5,000 Units, Subcutaneous, Q8H SHANNON  insulin lispro, 1-5 Units, Subcutaneous, TID AC  insulin lispro, 1-5 Units, Subcutaneous, HS  levothyroxine, 75 mcg, Oral, Early Morning  magnesium chloride, 64 mg, Oral, Daily  pantoprazole, 40 mg, Oral, Early Morning  pravastatin, 80 mg, Oral, Daily With Dinner      Continuous IV Infusions:     PRN Meds:  acetaminophen, 650 mg, Oral, Q6H PRN  aluminum-magnesium hydroxide-simethicone, 15 mL, Oral, Q6H PRN  ondansetron, 4 mg, Intravenous, Q6H PRN  senna, 2 tablet, Oral, Daily PRN    SCDs  Tele  Neuro checks q 4 hr x 24 hrs  Up w/ assist   POC GLUCOSE AC/HS WITH SSI COVERAGE   UA with reflex  ADA cardiac diet - no chocolate, caffeine  IP CONSULT TO CASE MANAGEMENT    Network Utilization Review Department  [email or web address]  ATTENTION: Please call with any questions or concerns to [phone] and carefully listen to the prompts so that you are directed to the right person  All voicemails are confidential   Meenacurly Kingdi all requests for admission clinical reviews, approved or denied determinations and any other requests to dedicated fax number below belonging to the campus where the patient is receiving treatment   List of dedicated fax numbers for the Facilities:  [address] [place] [address] DENIALS (Administrative/Medical Necessity) [phone]   [address] (Maternity/NICU/Pediatrics) [phone]   Shira Flatten [phone]   Nico Mahan [phone]   Sara Gaming [phone]   Elio Gallardo [phone]   [address] [place] [address] [phone]   [facility]  [phone]   [address] [facility] [address]  [address] [address] [address] [facility] [phone]

## 2020-09-21 NOTE — CONSULTS
Consultation - Cardiology   Lesia Mansfield 80 y o  female MRN: [id number]  Unit/Bed#: [facility] 834-01 Encounter: [id number]    Assessment/Plan     Bradyarrhythmia  - In ED, patient became bradycardic to 40s with SBP of 70  Improved following fluid bolus  - Patient has history of hypothyroidism, currently taking levothyroxine  TSH wnl   - Continue to monitor on telmetry  - At this point, it is difficult to correlate whether patient's episodes of unresponsiveness are due to bradyarrhythmia, hypovolemia, or another cause  - Will send home with Holter patch monitor to see if episodes of bradycardia correlate with syncope to further assess need for pacemaker  - Will discontinue metoprolol due to bradycardia    Coronary Artery Disease  - Patient s/p stent x 1 in RCA in 2016; maintained on ASA, statin, and Beta blocker  - Continue ASA and statin  - Denies any chest pain or shortness of breath with extertion  - Echo from 2019 showed normal systolic function with EF of 55%, with mild aortic, mitral, and tricuspid regurgitation  -  Elevated troponin x 4 most likely due to CKD rather than NSTEMI  - Will get another echo to determine if any structural changes since 9/2019    Elevated Troponin  - Assessed and most likely secondary to chronic renal insufficiency, patient is s/p L nephrectomy in 2014 from [address]  - Trended x 4 (0 14/0 30/0 32/0 20), can stop trending    Syncope and Collapse  - Syncope in ED just prior to discharge  - Troponin increased  - Patient denies chest pain, palpitations, or SOB  - Intermittent bradycardia to 40s and 50s on telemetry  - BP and HR responded to 1L NS fluid bolus in ED, currently asymptomatic  - Echo ordered    Hypertension  - Metoprolol to be discontinued  Lasix currently on hold  Continue amlodipine 5 mg      Recurrent Mechanical Falls  - PT/OT  - Fall precautions    History of Present Illness   Physician Requesting Consult: Sherren Pax, MD  Reason for Consult / Principal Problem: Syncope with LOC in the ED, had bradycardia and elevated troponins /  Fall at home  HPI: Kel Buck is a 80y o  year old female with past medical history of HTN, solitary kidney (RCC s/p L nephrectomy in 2014), CAD (s/p stent x 1 to RCA in 2016) on ASA, hypothyroidism, and T2DM who presented to ED yesterday around 20:00 after a fall at home  Patient states she was bending down to grab a twig off the ground when she lost her balance and fell on her face  She denies any LOC  She denies any preceding lightheadedness, dizziness, chest pain, palpitations, or SOB  She was on the ground for approximately 30-45 minutes until EMS came after she pressed her "Life Alert" bracelet  In ED, patient was evaluated for head trauma, abrasions on face, and L wrist pain  Initial imaging showed no evidence of acute traumatic injury and so patient requested to be discharged  While awaiting discharge instructions, patient noted to become symptomatically dizzy with standing and ambulation and was not ed to be staring off and not answering questions appropriately  Episode resolved on its own after 5 minutes  After discussion with patient and her granddaughter, patient was amenable to staying in hospital  Of note, patient was bradycardic to the 40s in ED and hypotensive shortly after  ECG showed sinus bradycardia with LBBB  She was started on a normal saline bolus 1 L and BP and HR improved and remained normal  Patient was also noted to have elevated troponin x 4 (peak 0 32) but patient also has chronic troponin elevation secondary to CKD  During this encounter, Patient denies any headaches, visual changes, dysarthria, palpitations, dizziness, numbness or weakness in arms or legs, chest pain , or SOB  Patient states she lives at home by herself and her granddaughter checks up on her to make sure she is taking her medications  She has had multiple falls in the past including this past February, in which she was hospitalized   While there, she had an abnormal ECG which showed sinus bradycardia with LBBB  She has a cane and walker at home but she often forgets to use them  She follows with cardiologist Dr Daquan Nassar at [facility], who she last saw about a year ago  Last echo was 9/2019, which showed normal systolic function with EF of 55% with mild aortic, mitral, and tricuspid regurgitation  Inpatient consult to Cardiology  Consult performed by: Richi Vogel  Consult ordered by: Ric Lawrence MD        Review of Systems   Constitutional: Negative for chills, diaphoresis and fever  HENT: Negative for congestion, rhinorrhea and sore throat  Eyes: Positive for discharge  Negative for pain and visual disturbance  Respiratory: Negative for chest tightness, shortness of breath and wheezing  Cardiovascular: Negative for chest pain and palpitations  Gastrointestinal: Negative for abdominal pain, constipation, diarrhea and vomiting  Genitourinary: Negative for difficulty urinating and dysuria  Musculoskeletal: Positive for arthralgias  Negative for back pain  Skin: Negative for rash  Neurological: Negative for facial asymmetry, weakness and numbness  Psychiatric/Behavioral: Negative for behavioral problems, hallucinations and self-injury         Historical Information   Past Medical History:   Diagnosis Date    Acquired absence of kidney     Frequency of micturition     GERD (gastroesophageal reflux disease)     Hypertension     Malignant neoplasm of left kidney, except renal pelvis (HCC)     Nocturia     Personal history of other malignant neoplasm of kidney     Renal mass, left     UTI (urinary tract infection)      Past Surgical History:   Procedure Laterality Date    RADICAL NEPHRECTOMY Left 2013     Social History     Substance and Sexual Activity   Alcohol Use Never    Frequency: Never     Social History     Substance and Sexual Activity   Drug Use Never     E-Cigarette/Vaping     E-Cigarette/Vaping Substances     Social History     Tobacco Use   Smoking Status Never Smoker   Smokeless Tobacco Never Used     Family History:   Family History   Problem Relation Age of Onset    Diabetes Father     Heart failure Brother        Meds/Allergies   all current active meds have been reviewed and current meds:   Current Facility-Administered Medications   Medication Dose Route Frequency    acetaminophen (TYLENOL) tablet 650 mg  650 mg Oral Q6H PRN    aluminum-magnesium hydroxide-simethicone (MYLANTA) 200-200-20 mg/5 mL oral suspension 15 mL  15 mL Oral Q6H PRN    aspirin chewable tablet 81 mg  81 mg Oral Daily    bacitracin topical ointment 1 small application  1 small application Topical BID    cyanocobalamin (VITAMIN B-12) tablet 1,000 mcg  1,000 mcg Oral Daily    diclofenac sodium (VOLTAREN) 1 % topical gel 2 g  2 g Topical 4x Daily    docusate sodium (COLACE) capsule 100 mg  100 mg Oral BID    heparin (porcine) subcutaneous injection 5,000 Units  5,000 Units Subcutaneous Q8H [facility]    insulin lispro (HumaLOG) 100 units/mL subcutaneous injection 1-5 Units  1-5 Units Subcutaneous TID AC    insulin lispro (HumaLOG) 100 units/mL subcutaneous injection 1-5 Units  1-5 Units Subcutaneous HS    levothyroxine tablet 75 mcg  75 mcg Oral Early Morning    magnesium chloride (MAG64) EC tablet TBEC 64 mg  64 mg Oral Daily    ondansetron (ZOFRAN) injection 4 mg  4 mg Intravenous Q6H PRN    pantoprazole (PROTONIX) EC tablet 40 mg  40 mg Oral Early Morning    pravastatin (PRAVACHOL) tablet 80 mg  80 mg Oral Daily With Dinner    senna (SENOKOT) tablet 17 2 mg  2 tablet Oral Daily PRN     Allergies   Allergen Reactions    Ace Inhibitors      Other reaction(s): Hypotension    Cephalexin     Clindamycin     Doxycycline     Erythromycin Base     Lactate     Levaquin [Levofloxacin]     Penicillins     Procainamide     Quinidine (Non-Therapeutic)     Sulfa Antibiotics        Objective   Vitals: Blood pressure (!) 170/125, pulse (!) 46, temperature 98 1 °F (36 7 °C), resp  rate 18, height 5' 3" (1 6 m), weight 68 8 kg (151 lb 10 8 oz), SpO2 99 %, not currently breastfeeding  Orthostatic Blood Pressures      Most Recent Value   Blood Pressure  (!) 170/125 filed at 09/21/2020 9037   Patient Position - Orthostatic VS  Standing - Orthostatic VS filed at 09/21/2020 0202          No intake or output data in the 24 hours ending 09/21/20 1336    Invasive Devices     Peripheral Intravenous Line            Peripheral IV 09/20/20 Left Antecubital less than 1 day                Physical Exam  Constitutional:       General: She is not in acute distress  Appearance: Normal appearance  She is normal weight  She is not ill-appearing or diaphoretic  HENT:      Head: Normocephalic  Comments: Abrasions on forehead and above L eye       Right Ear: External ear normal       Left Ear: External ear normal       Nose: Nose normal       Mouth/Throat:      Mouth: Mucous membranes are moist       Pharynx: No oropharyngeal exudate  Eyes:      General:         Right eye: No discharge  Left eye: No discharge  Conjunctiva/sclera: Conjunctivae normal    Neck:      Musculoskeletal: Normal range of motion  Vascular: No carotid bruit  Cardiovascular:      Rate and Rhythm: Normal rate and regular rhythm  Pulses: Normal pulses  Heart sounds: Normal heart sounds  Pulmonary:      Effort: No respiratory distress  Breath sounds: Normal breath sounds  No wheezing or rales  Comments: Short, quick breaths   Abdominal:      General: There is no distension  Palpations: There is no mass  Musculoskeletal:         General: No deformity  Right lower leg: No edema  Left lower leg: No edema  Skin:     General: Skin is warm  Capillary Refill: Capillary refill takes less than 2 seconds  Findings: No erythema or rash  Neurological:      General: No focal deficit present        Mental Status: She is alert and oriented to person, place, and time  Lab Results:   I have personally reviewed pertinent lab results  CBC with diff:   Results from last 7 days   Lab Units 09/21/20  0904 09/21/20  0503   WBC Thousand/uL  --  10 22*   RBC Million/uL  --  3 81   HEMOGLOBIN g/dL  --  11 4*   HEMATOCRIT %  --  36 1   MCV fL  --  95   MCH pg  --  29 9   MCHC g/dL  --  31 6   RDW %  --  13 2   MPV fL 10 3 10 3   PLATELETS Thousands/uL 282 266     CMP:   Results from last 7 days   Lab Units 09/21/20  0503   SODIUM mmol/L 136   POTASSIUM mmol/L 4 7   CHLORIDE mmol/L 103   CO2 mmol/L 26   BUN mg/dL 36*   CREATININE mg/dL 1 41*   CALCIUM mg/dL 9 1   AST U/L 14   ALT U/L 13   ALK PHOS U/L 116   EGFR ml/min/1 73sq m 33     Troponin:   0   Lab Value Date/Time    TROPONINI 0 23 (H) 09/21/2020 0904    TROPONINI 0 32 (H) 09/21/2020 0503    TROPONINI 0 30 09/21/2020 0230    TROPONINI 0 14 (H) 09/20/2020 2231    TROPONINI 0 27 (H) 02/16/2020 0645    TROPONINI 0 28 (H) 02/16/2020 0351    TROPONINI 0 26 (H) 02/16/2020 0044    TROPONINI 0 07 (H) 02/15/2020 1952    TROPONINI 0 38 (H) 09/14/2019 0203    TROPONINI 0 54 (H) 09/13/2019 2212    TROPONINI 0 47 (H) 09/13/2019 1920    TROPONINI 0 38 (H) 09/13/2019 1612     BNP:   Results from last 7 days   Lab Units 09/21/20  0503   POTASSIUM mmol/L 4 7   CHLORIDE mmol/L 103   CO2 mmol/L 26   BUN mg/dL 36*   CREATININE mg/dL 1 41*   CALCIUM mg/dL 9 1   EGFR ml/min/1 73sq m 33     Coags:     TSH:   Results from last 7 days   Lab Units 09/21/20  0503   TSH 3RD GENERATON uIU/mL 1 290     Magnesium:   Results from last 7 days   Lab Units 09/21/20  0503   MAGNESIUM mg/dL 1 7     Lipid Profile:   Results from last 7 days   Lab Units 09/21/20  0503   HDL mg/dL 74   LDL CALC mg/dL 106*   TRIGLYCERIDES mg/dL 148     Imaging: I have personally reviewed pertinent reports  CT chest abdomen pelvis wo contrast   Final Result by Tere Trejo MD (09/21 0111)      1    No acute traumatic injury identified within chest, abdomen and pelvis  2   Diffuse scattered groundglass nodules within the peripheral lungs which may be due to atypical infection  3   Coronary artery calcifications  4   3 4 cm round subcapsular hypodense lesion in the right lobe of the liver, incompletely characterized  Further evaluation with MRI of the abdomen is recommended  Workstation performed: [id number]         XR wrist 3+ views LEFT   Final Result by Joel Fischer MD (09/21 0732)      No acute osseous abnormality  Degenerative changes as described  Workstation performed: [id number]         XR hand 3+ views LEFT   Final Result by Joel Fischer MD (09/21 2921)      No acute osseous abnormality  Degenerative changes as described  Workstation performed: [id number]         CT head without contrast   Final Result by Brenda Maharaj MD (09/20 2054)      No acute intracranial abnormality  There is an unchanged, chronic left frontoparietal lobe infarct (series 2 image 26 )                  Workstation performed: [id number]         CT spine cervical without contrast   Final Result by Brenda Maharaj MD (09/20 2388)      No cervical spine fracture or traumatic malalignment  Workstation performed: [id number]             EKG:  VTE Prophylaxis: Sequential compression device (Venodyne)  and Heparin    Code Status: Level 3 - DNAR and DNI  Advance Directive and Living Will:      Power of :    POLST:      Counseling / Coordination of Care  Total floor / unit time spent today 30 minutes  Greater than 50% of total time was spent with the patient and / or family counseling and / or coordination of care

## 2020-09-21 NOTE — PLAN OF CARE
Problem: PHYSICAL THERAPY ADULT  Goal: Performs mobility at highest level of function for planned discharge setting  See evaluation for individualized goals  Description: Treatment/Interventions: (P) Functional transfer training, LE strengthening/ROM, Elevations, Therapeutic exercise, Endurance training, Cognitive reorientation, Patient/family training, Equipment eval/education, Gait training, Bed mobility          See flowsheet documentation for full assessment, interventions and recommendations  Note: Prognosis: Good  Problem List: Decreased strength, Decreased endurance, Impaired balance, Decreased mobility, Decreased cognition, Decreased safety awareness  Assessment: (P) Pt is a 80 y o  female seen for PT evaluation s/p admit to [facility] on 9/20/2020  Pt was admitted with a primary dx of: vasovagal syncope, fall  PT now consulted for assessment of mobility and d/c needs  Pt with Up with assistance orders  Pts current comorbidities effecting treatment include: HTN, Bradycardia, L BBB, DM, CKD, CAD  Pts current clinical presentation is Unstable/ Unpredictable (high complexity) due to Ongoing medical management for primary dx, Increased reliance on more restrictive AD compared to baseline, Decreased activity tolerance compared to baseline, Fall risk, Cog status  Prior to admission, pt was independent with all household mobility, does not utilize AD, drives  Upon evaluation, pt currently is requiring supervision for transfers and supervision for ambulation 30 ft w/ RW  Pt presents at PT eval functioning below baseline and currently w/ overall mobility deficits 2* to: BLE weakness, impaired balance, decreased endurance, pain, decreased activity tolerance compared to baseline, decreased functional mobility tolerance compared to baseline, decreased safety awareness, impaired judgement, fall risk, decreased cognition  Pt currently at a fall risk 2* to impairments listed above    Pt will continue to benefit from skilled acute PT interventions to address stated impairments; to maximize functional mobility; for ongoing pt/ family training; and DME needs  At conclusion of PT session pt returned back in chair and chair alarm engaged with phone and call bell within reach  Pt denies any further questions at this time  Pt reports 6 -7 recent falls, resides alone, has a granddaughter near by, but is unable to assist often as she has young children  Pt is a high fall risk with poor safety awareness  Recommend IP rehab upon hospital D/C  Barriers to Discharge: (P) Decreased caregiver support     PT Discharge Recommendation: (P) Post-Acute Rehabilitation Services     PT - OK to Discharge: (P) Yes(to IP rehab)    See flowsheet documentation for full assessment

## 2020-09-21 NOTE — ASSESSMENT & PLAN NOTE
Incidental finding on CT scan (3 4 cm round subcapsular hypodense lesion in right lower lobe of liver incompletely characterize);    Outpatient MRI when stable

## 2020-09-21 NOTE — ASSESSMENT & PLAN NOTE
Patient came in with status post fall and incidental finding of low blood pressure and at the time that the incident of fall happened she might have passed out while she was picking up garbage  Patient given bolus of intravenous fluids and will check orthostatic blood pressures  Troponins elevated (may be secondary to chronic renal insufficiency as assessed by Cardiology in the past); and will check x3  Telemetry  Will recheck metabolic profile with CBC in the morning

## 2020-09-21 NOTE — ED PROVIDER NOTES
History  Chief Complaint   Patient presents with    Fall     trauma level c - fall on aspirin 81mg hitting face on macadam     HPI     80-year-old female with past medical history of hypertension, solitary kidney, coronary artery disease on aspirin and hypothyroidism who presents for evaluation after a fall  Patient states she was bending down her grab something off the ground when she lost her balance and fell  She landed on her face  Patient denies any loss of consciousness  Patient denies any preceding lightheadedness, dizziness, chest pain, palpitations, or shortness of breath  Patient states she was on the ground for approximately 30-45 minutes before she was able to get up  Patient is currently complaining of pain in her left wrist   She also endorses some mild pain on the right side of her neck  Patient denies any headaches, visual changes, dysarthria, dysphagia vertigo, numbness or weakness in her arms or legs, chest pain, shortness of breath, abdominal pain  Denies any pain in her legs or her right arm  Patient states she lives at home by herself  She uses a walker or a cane for ambulation was not using it today  Prior to Admission Medications   Prescriptions Last Dose Informant Patient Reported? Taking?    Aspirin-Calcium Carbonate  MG TABS   Yes No   Sig: Take 1 tablet by mouth   Cyanocobalamin (B-12) 1000 MCG CAPS  Self Yes No   Sig: Take 1 tablet by mouth daily   Diclofenac Sodium  MG 24 hr tablet   Yes No   Sig: Take by mouth Daily   acetaminophen (TYLENOL) 325 mg tablet   No No   Sig: Take 2 tablets (650 mg total) by mouth every 6 (six) hours as needed for mild pain, headaches or fever   aspirin 81 MG tablet  Self Yes No   Sig: Take 1 tablet by mouth daily   betamethasone valerate (VALISONE) 0 1 % ointment   Yes No   Sig: APPLY TO THE AFFECTED AREA SMALL AMOUNT 1 TO 2 TIMES A WEEK FOR IRRITATION/ITCHING   esomeprazole (NexIUM) 40 MG capsule   Yes No   Sig: Take 1 capsule by Physician Discharge Summary     Patient ID:  Vanda Arzola  [id number]  72 year old  1946    Admit date: 2018    Discharge date and time: 18,     Admitting Physician: Ranjit Collado MD     Discharge Physician: Aroldo Viveros MD    Admission Diagnoses: hospice    Discharge Diagnoses:   Generalized weakness  Acute UTI, associated with chronic argueta  Acute on chronic systolic heart failure : cause of death    Acute respiratory failure: cause of death   Hyponatremia  Iron deficiency anemia  Elevated troponin (chronic), recent NSTEMI  CKD III  Chronic AFib, rate controlled, not on AC  Pulmonary HTN  DM type II with neuropathy  Stage III pressure injury coccyx (present on admission)  Morbid obesity (BMI 39)    Admission Condition: serious    Discharged Condition:     Indication for Admission: 72 year old female. She is admitted from the ED with weakness/UTI. She has been home since 10/18 from an extended hospital stay, has Visiting Physician and [facility] that have been following. There have been up's and down's since she has been home, but today was seen by C RN who felt her legs were more swollen, urine was more discolored, and she seemed weaker/more lethargic than the past few visits; her blood pressure was quite low at home. Patient is not very talkative about how she feels, mostly complains of pain in her coccyx from her pressure sore (getting bigger and deeper per son because she has not been able to get out of bed, even with the marion they have at home). There have been no fevers. She is eating fair, not overdoing the water intake. She denies fever or chills, no increase in shortness of breath. She has home O2 at 2.5 L/NC, they have bumped this up slightly to 3 L since yesterday. Workup in the ED with abnormal UA, elevated WBC, no fever, normal lactic acid; CXR with possible fluid overload but she appears dehydrated.      Hospital Course: Patient with multiple chronic medical  issues and persistent decline was admitted from home with acute UTI, acute on chronic heart failure, and increased weakness and lethargy. Cardiology and nephrology were consulted and despite treatment with IV diuretics, IV antibiotics, and medication adjustments, patient made little progress and continued to have significant pain and anxiety and ongoing shortness of breath. After a family meeting and discussion about prognosis and goals of care, her family and POA have opted for comfort care and inpatient hospice. Order placed on 11/19/20.    Patient condition worsen gradually, become more apneic and hypoxic and pt passed away on 11/20/2018, 8:20AM.  at bedside.    Consults: cardiology, nephrology    Aroldo Viveros MD  [facility] Hospitalist  Pager : [phone]       mouth Daily   furosemide (LASIX) 20 mg tablet   Yes No   Sig: Take 20 mg by mouth   levothyroxine 75 mcg tablet  Self Yes No   Sig: Take 1 tablet by mouth daily   magnesium chloride (MAG64) 64 MG TBEC EC tablet   Yes No   Sig: Take 1 tablet by mouth   metFORMIN (GLUCOPHAGE-XR) 750 mg 24 hr tablet   Yes No   Sig: TAKE 1 TABLET (750 MG TOTAL) BY MOUTH DAILY WITH BREAKFAST    metoprolol succinate (TOPROL-XL) 25 mg 24 hr tablet  Self Yes No   Sig: Take 1 tablet by mouth daily   omeprazole (PRILOSEC) 20 mg delayed release capsule  Self Yes No   Sig: Take 1 capsule by mouth daily   simvastatin (ZOCOR) 40 mg tablet  Self Yes No   Sig: Take 1 tablet by mouth daily   sitaGLIPtin (JANUVIA) 25 mg tablet   Yes No   Sig: Take 25 mg by mouth daily      Facility-Administered Medications: None       Past Medical History:   Diagnosis Date    Acquired absence of kidney     Frequency of micturition     GERD (gastroesophageal reflux disease)     Hypertension     Malignant neoplasm of left kidney, except renal pelvis (HCC)     Nocturia     Personal history of other malignant neoplasm of kidney     Renal mass, left     UTI (urinary tract infection)        Past Surgical History:   Procedure Laterality Date    RADICAL NEPHRECTOMY Left 2013       Family History   Problem Relation Age of Onset    Diabetes Father     Heart failure Brother      I have reviewed and agree with the history as documented  E-Cigarette/Vaping     E-Cigarette/Vaping Substances     Social History     Tobacco Use    Smoking status: Never Smoker    Smokeless tobacco: Never Used   Substance Use Topics    Alcohol use: Never     Frequency: Never    Drug use: Never        Review of Systems   Constitutional: Negative for appetite change, chills, fatigue, fever and unexpected weight change  HENT: Negative for congestion, rhinorrhea, sore throat and trouble swallowing  Eyes: Negative for visual disturbance     Respiratory: Negative for cough, chest tightness, shortness of breath and wheezing  Cardiovascular: Negative for chest pain, palpitations and leg swelling  Gastrointestinal: Negative for abdominal distention, abdominal pain, constipation, diarrhea, nausea and vomiting  Genitourinary: Negative for dysuria, frequency, hematuria and urgency  Musculoskeletal: Positive for arthralgias and neck pain  Negative for back pain, gait problem and myalgias  Skin: Negative for color change, pallor and rash  Neurological: Negative for dizziness, syncope, speech difficulty, weakness, light-headedness, numbness and headaches  All other systems reviewed and are negative  Physical Exam  ED Triage Vitals   Temperature Pulse Respirations Blood Pressure SpO2   09/20/20 1949 09/20/20 1949 09/20/20 1949 09/20/20 1949 09/20/20 1949   (!) 97 3 °F (36 3 °C) 82 18 169/81 96 %      Temp Source Heart Rate Source Patient Position - Orthostatic VS BP Location FiO2 (%)   09/20/20 1949 09/20/20 1949 09/20/20 1949 -- --   Oral Monitor Lying        Pain Score       09/20/20 2051       7             Orthostatic Vital Signs  Vitals:    09/21/20 0159 09/21/20 0200 09/21/20 0202 09/21/20 0754   BP: 152/63 149/85 153/85 (!) 170/125   Pulse:       Patient Position - Orthostatic VS: Lying - Orthostatic VS Sitting - Orthostatic VS Standing - Orthostatic VS        Physical Exam  Vitals signs and nursing note reviewed  Constitutional:       General: She is not in acute distress  Appearance: Normal appearance  She is well-developed and normal weight  She is not ill-appearing, toxic-appearing or diaphoretic  HENT:      Head:      Comments: Ecchymoses and abrasions over the left side of the forehead  Right Ear: Tympanic membrane normal       Left Ear: Tympanic membrane normal       Nose:      Comments: Abrasion over the bridge of the nose  No nasal tenderness  No septal hematoma        Mouth/Throat:      Mouth: Mucous membranes are moist       Pharynx: Oropharynx is clear    Eyes:      General: No visual field deficit  Right eye: No discharge  Left eye: No discharge  Extraocular Movements: Extraocular movements intact  Conjunctiva/sclera: Conjunctivae normal       Pupils: Pupils are equal, round, and reactive to light  Neck:      Musculoskeletal: Neck supple  Muscular tenderness present  Comments: Mild right sided neck tenderness  Cardiovascular:      Rate and Rhythm: Normal rate and regular rhythm  Pulses: Normal pulses  Heart sounds: Normal heart sounds  No murmur  No friction rub  No gallop  Pulmonary:      Effort: Pulmonary effort is normal  No respiratory distress  Breath sounds: Normal breath sounds  No wheezing or rales  Abdominal:      General: Bowel sounds are normal  There is no distension  Palpations: Abdomen is soft  Tenderness: There is no abdominal tenderness  There is no guarding or rebound  Musculoskeletal:         General: Tenderness and signs of injury present  No swelling or deformity  Right lower leg: No edema  Left lower leg: No edema  Comments: Tenderness over the left distal radius and left first metacarpal   No snuffbox tenderness  No tenderness over the rest of the left hand or in the left elbow or shoulder  No swelling or deformity  Small abrasions located over the bilateral knees  Range of motion full in the bilateral hips and knees without pain  Skin:     General: Skin is warm and dry  Capillary Refill: Capillary refill takes less than 2 seconds  Coloration: Skin is not pale  Findings: Bruising present  No erythema or rash  Neurological:      General: No focal deficit present  Mental Status: She is alert and oriented to person, place, and time  Cranial Nerves: Cranial nerves are intact  No cranial nerve deficit, dysarthria or facial asymmetry  Sensory: Sensation is intact  No sensory deficit  Motor: Motor function is intact   No weakness  Coordination: Coordination is intact  Coordination normal       Gait: Gait is intact  Gait normal       Comments: Strength normal in the left hand in the ulnar, radial, and median nerve distribution  Sensation intact in ulnar, median and radial nerve distribution in the left hand      Psychiatric:         Mood and Affect: Mood normal          Behavior: Behavior normal          ED Medications  Medications   bacitracin topical ointment 1 small application (1 small application Topical Given 9/21/20 0831)   acetaminophen (TYLENOL) tablet 650 mg (has no administration in time range)   aspirin chewable tablet 81 mg (81 mg Oral Given 9/21/20 0830)   cyanocobalamin (VITAMIN B-12) tablet 1,000 mcg (1,000 mcg Oral Given 9/21/20 0830)   pantoprazole (PROTONIX) EC tablet 40 mg (40 mg Oral Given 9/21/20 0504)   levothyroxine tablet 75 mcg (75 mcg Oral Given 9/21/20 0504)   magnesium chloride (MAG64) EC tablet TBEC 64 mg (64 mg Oral Given 9/21/20 0831)   pravastatin (PRAVACHOL) tablet 80 mg (has no administration in time range)   ondansetron (ZOFRAN) injection 4 mg (has no administration in time range)   aluminum-magnesium hydroxide-simethicone (MYLANTA) 200-200-20 mg/5 mL oral suspension 15 mL (has no administration in time range)   heparin (porcine) subcutaneous injection 5,000 Units (5,000 Units Subcutaneous Given 9/21/20 1300)   insulin lispro (HumaLOG) 100 units/mL subcutaneous injection 1-5 Units (1 Units Subcutaneous Given 9/21/20 1154)   insulin lispro (HumaLOG) 100 units/mL subcutaneous injection 1-5 Units (has no administration in time range)   docusate sodium (COLACE) capsule 100 mg (100 mg Oral Given 9/21/20 0831)   senna (SENOKOT) tablet 17 2 mg (has no administration in time range)   diclofenac sodium (VOLTAREN) 1 % topical gel 2 g (2 g Topical Refused 9/21/20 1301)   acetaminophen (TYLENOL) tablet 975 mg (975 mg Oral Given 9/20/20 2051)   sodium chloride 0 9 % bolus 1,000 mL (1,000 mL Intravenous New Bag 9/20/20 2315)   amLODIPine (NORVASC) tablet 5 mg (5 mg Oral Given 9/21/20 0831)       Diagnostic Studies  Results Reviewed     Procedure Component Value Units Date/Time    Troponin I [[id number]]  (Abnormal) Collected:  09/20/20 2231    Lab Status:  Final result Specimen:  Blood from Arm, Left Updated:  09/20/20 2302     Troponin I 0 14 ng/mL     Comprehensive metabolic panel [[id number]]  (Abnormal) Collected:  09/20/20 2231    Lab Status:  Final result Specimen:  Blood from Arm, Left Updated:  09/20/20 2300     Sodium 133 mmol/L      Potassium 5 0 mmol/L      Chloride 101 mmol/L      CO2 24 mmol/L      ANION GAP 8 mmol/L      BUN 38 mg/dL      Creatinine 1 59 mg/dL      Glucose 211 mg/dL      Calcium 9 4 mg/dL      AST 14 U/L      ALT 11 U/L      Alkaline Phosphatase 125 U/L      Total Protein 7 4 g/dL      Albumin 3 6 g/dL      Total Bilirubin 0 24 mg/dL      eGFR 29 ml/min/1 73sq m     Narrative:       National Kidney Disease Foundation guidelines for Chronic Kidney Disease (CKD):     Stage 1 with normal or high GFR (GFR > 90 mL/min/1 73 square meters)    Stage 2 Mild CKD (GFR = 60-89 mL/min/1 73 square meters)    Stage 3A Moderate CKD (GFR = 45-59 mL/min/1 73 square meters)    Stage 3B Moderate CKD (GFR = 30-44 mL/min/1 73 square meters)    Stage 4 Severe CKD (GFR = 15-29 mL/min/1 73 square meters)    Stage 5 End Stage CKD (GFR <15 mL/min/1 73 square meters)  Note: GFR calculation is accurate only with a steady state creatinine    CBC and differential [[id number]]  (Abnormal) Collected:  09/20/20 2231    Lab Status:  Final result Specimen:  Blood from Arm, Left Updated:  09/20/20 2257     WBC 10 44 Thousand/uL      RBC 3 95 Million/uL      Hemoglobin 11 8 g/dL      Hematocrit 38 3 %      MCV 97 fL      MCH 29 9 pg      MCHC 30 8 g/dL      RDW 13 1 %      MPV 10 7 fL      Platelets 076 Thousands/uL      nRBC 0 /100 WBCs      Neutrophils Relative 74 %      Immat GRANS % 1 %      Lymphocytes Relative 14 % Monocytes Relative 10 %      Eosinophils Relative 1 %      Basophils Relative 0 %      Neutrophils Absolute 7 80 Thousands/µL      Immature Grans Absolute 0 05 Thousand/uL      Lymphocytes Absolute 1 50 Thousands/µL      Monocytes Absolute 0 99 Thousand/µL      Eosinophils Absolute 0 06 Thousand/µL      Basophils Absolute 0 04 Thousands/µL     POCT urinalysis dipstick [[id number]]     Lab Status:  No result Specimen:  Urine     Fingerstick Glucose (POCT) [[id number]]  (Abnormal) Collected:  09/20/20 2222    Lab Status:  Final result Updated:  09/20/20 2223     POC Glucose 218 mg/dl                  CT chest abdomen pelvis wo contrast   Final Result by Caden Reilly MD (09/21 0111)      1  No acute traumatic injury identified within chest, abdomen and pelvis  2   Diffuse scattered groundglass nodules within the peripheral lungs which may be due to atypical infection  3   Coronary artery calcifications  4   3 4 cm round subcapsular hypodense lesion in the right lobe of the liver, incompletely characterized  Further evaluation with MRI of the abdomen is recommended  Workstation performed: [id number]         XR wrist 3+ views LEFT   Final Result by Latoya Leggett MD (09/21 0732)      No acute osseous abnormality  Degenerative changes as described  Workstation performed: [id number]         XR hand 3+ views LEFT   Final Result by Latoya Leggett MD (09/21 1027)      No acute osseous abnormality  Degenerative changes as described  Workstation performed: [id number]         CT head without contrast   Final Result by Barb Mar MD (09/20 2054)      No acute intracranial abnormality  There is an unchanged, chronic left frontoparietal lobe infarct (series 2 image 26 )                  Workstation performed: [id number]         CT spine cervical without contrast   Final Result by Barb Mar MD (09/20 1373)      No cervical spine fracture or traumatic malalignment  Workstation performed: [id number]               Procedures  Procedures      ED Course             Identification of Seniors at Risk      Most Recent Value   (ISAR) Identification of Seniors at Risk   Before the illness or injury that brought you to the Emergency, did you need someone to help you on a regular basis? 1 Filed at: 09/20/2020 2141   In the last 24 hours, have you needed more help than usual?  1 Filed at: 09/20/2020 2141   Have you been hospitalized for one or more nights during the past 6 months? 0 Filed at: 09/20/2020 2141   In general, do you see well? 1 Filed at: 09/20/2020 2141   In general, do you have serious problems with your memory? 0 Filed at: 09/20/2020 2141   Do you take more than three different medications every day? 1 Filed at: 09/20/2020 2141   ISAR Score  4 Filed at: 09/20/2020 2141                                MDM    22-year-old female with past medical history of hypertension, solitary kidney, coronary artery disease on aspirin and hypothyroidism presents for evaluation after a fall  Patient is primarily complaining of left wrist pain  Initial vitals within normal limits  Differential diagnosis includes:  Intracranial bleed, C-spine injury, left wrist fracture, left wrist sprain  Will obtain CT head, C-spine and Xray of left hand and wrist  Will give tylenol for pain  CT head and neck negative  Xray left wrist negative for any acute fractures  Patient able to ambulate with walker without any difficulty  Patient was ready for discharge, and she was about to be brought outside to her granddaughter by wheelchair, she was found to be staring off and not answering questons  Nursing staff had to help lift her back in the bed  On my evaluation, patient states she is feeling normal   Denies feeling any lightheadedness or dizziness  Patient's heart rate is in the 50s, blood pressure stable    Discussed with patient, explained to her that this event is concerning and it would be a good idea to be admitted for workup rather than to go home  Patient lives alone  Patient does not want to stay  Discussed with patient's granddaughter, she would feel more comfortable patient stayed in the hospital as well  Patient was agreeable to stay for admission  Will obtain labs including CBC, CMP, troponin, EKG and UA  Troponin slightly elevated at 0 14, Creatinine 1 59 (baseline around 1 3-1 6)  EKG shows LBBB, no change from prior  Discussed with trauma, patient has no to traumatic injuries on CT head or C-spine  Patient was admitted to medicine  Noted by RN that patient is hypotensive at 70/40  I went to evaluate patient, patient is mentating normally  Denies any lightheadedness  Patient's heart rate is in the 40s  Started 1 L IV fluids  Patient's blood pressure improved to 140/60  Will scan CT chest abdomen pelvis without contrast due to low GFR to look for intra-abdominal abnormality or significant bleeding  Discussed code status with patient, patient is she like to be level 3 DNAR/DNI  Patient states she would not want a central line, pressors or transvenous pacer if that was required for lifesaving measures  CT abd/pelvis negative for any traumatic injuries  Discussed with Dr Jenniffer Matias from [facility], will admit to med/surg inpatient status  Disposition  Final diagnoses:   Fall, initial encounter   Left wrist pain   Hypotension     Time reflects when diagnosis was documented in both MDM as applicable and the Disposition within this note     Time User Action Codes Description Comment    9/20/2020  9:35 PM Anuja Sax Add [N87  YIOP] Fall, initial encounter     9/20/2020  9:35 PM Anuja Sax Add [M25 532] Left wrist pain     9/21/2020  1:08 AM Anuja Sax Add [I95 9] Hypotension     9/21/2020 11:33 AM Alfred Lukasz Add [I49 8] Bradyarrhythmia     9/21/2020 11:33 AM Alfred Lukasz Add [R79 89] Elevated troponin       ED Disposition     ED Disposition Condition Date/Time Comment    Admit Stable Mon Sep 21, 2020  1:08 AM Case was discussed with AUDREY and the patient's admission status was agreed to be Admission Status: inpatient status to the service of Dr Demetrius Donald  Follow-up Information    None         Current Discharge Medication List      CONTINUE these medications which have NOT CHANGED    Details   acetaminophen (TYLENOL) 325 mg tablet Take 2 tablets (650 mg total) by mouth every 6 (six) hours as needed for mild pain, headaches or fever  Qty: 30 tablet, Refills: 0    Associated Diagnoses: Fall, initial encounter      aspirin 81 MG tablet Take 1 tablet by mouth daily      Aspirin-Calcium Carbonate  MG TABS Take 1 tablet by mouth      betamethasone valerate (VALISONE) 0 1 % ointment APPLY TO THE AFFECTED AREA SMALL AMOUNT 1 TO 2 TIMES A WEEK FOR IRRITATION/ITCHING  Refills: 2      Cyanocobalamin (B-12) 1000 MCG CAPS Take 1 tablet by mouth daily      Diclofenac Sodium  MG 24 hr tablet Take by mouth Daily      esomeprazole (NexIUM) 40 MG capsule Take 1 capsule by mouth Daily      furosemide (LASIX) 20 mg tablet Take 20 mg by mouth      levothyroxine 75 mcg tablet Take 1 tablet by mouth daily      magnesium chloride (MAG64) 64 MG TBEC EC tablet Take 1 tablet by mouth      metFORMIN (GLUCOPHAGE-XR) 750 mg 24 hr tablet TAKE 1 TABLET (750 MG TOTAL) BY MOUTH DAILY WITH BREAKFAST  Refills: 1      metoprolol succinate (TOPROL-XL) 25 mg 24 hr tablet Take 1 tablet by mouth daily      omeprazole (PRILOSEC) 20 mg delayed release capsule Take 1 capsule by mouth daily      simvastatin (ZOCOR) 40 mg tablet Take 1 tablet by mouth daily      sitaGLIPtin (JANUVIA) 25 mg tablet Take 25 mg by mouth daily           No discharge procedures on file  PDMP Review     None           ED Provider  Attending physically available and evaluated [facility]  I managed the patient along with the ED Attending      Electronically Signed by         Shemar Peters MD  09/21/20 [address] [place] [address]

## 2020-09-21 NOTE — TRAUMA DOCUMENTATION
Pt ambulates safely in ER with walker and denies any dizziness / new complaints  Dr aware and will be discharging patient  Pt's steffen Moreno Neither  Was called and can  pt in about 20-30 minutes

## 2020-09-21 NOTE — ED NOTES
Pt granddaughter had arrived and this nurse went in to take patient outside - found patient to be staring off, not answering questions appropriately, unable to assist in moving from chair to bed even with assist of 2 staff members  Dr Joaqíun Alcazar and Saira Olivares made aware and come to bedside to re-eval patient  Patient's episode resolves on its own after about 5 minutes since this nurse found her altered and then denies complaints  Pt states "this happens to me from time to time when im waiting on something for too long " Pt granddaughter was called back and agrees that pt should be admitted at this time and Roberta Esteves goes home        Radha Avila, ERIKA  09/20/20 3803

## 2020-09-21 NOTE — CASE MANAGEMENT
LOS 17 hours no bundle    Pt lives alone in ranch home  3 YVES  She has walkers and canes but does not use them  Patient is independent  Her laundry is in the basement  Patient has mulitlpe falls refusing VNA and Refusing STR  She stated " I'm going home"  No Hx MH or substance issues  She wears a life alert bracelet  Hx of STr at [facility]  No LW>    Uses [facility]  She states she can afford her medications  Pt stated she won't pay for caregivers to come to her home  She gets food stamps  She refuses VNA    Pt stated she goes to the [employer] every day for breakfast      Dr Rich Nunez is Pcp  CM reviewed d/c planning process including the following: identifying help at home, patient preference for d/c planning needs, Discharge Lounge, [facility] Meds to Bed program, availability of treatment team to discuss questions or concerns patient and/or family may have regarding understanding medications and recognizing signs and symptoms once discharged  CM also encouraged patient to follow up with all recommended appointments after discharge  Patient advised of importance for patient and family to participate in managing patients medical well being

## 2020-09-21 NOTE — DISCHARGE INSTRUCTIONS
Please return to the emergency department if you worsening headaches, visual changes, difficulty speaking or swallowing, difficulty walking, or numbness or weakness in her extremities  Please follow-up with your primary care doctor    You may take Tylenol and apply ice to your left wrist

## 2020-09-21 NOTE — ASSESSMENT & PLAN NOTE
Patient's heart rate is somewhere along 48 to mid 46s  According to previous notes from Cardiology, the patient has longstanding sinus bradycardia with LBBB  As part of syncopal orders will place patient in telemetry (syncope with cardiac cause)  Metoprolol put on hold

## 2020-09-21 NOTE — ASSESSMENT & PLAN NOTE
Elevated troponin was assessed prior by cardiology and may be secondary to chronic renal insufficiency  No complaints of chest pain  However with a history of CAD, will trend x3

## 2020-09-22 ENCOUNTER — APPOINTMENT (INPATIENT)
Dept: NON INVASIVE DIAGNOSTICS | Facility: HOSPITAL | Age: 85
DRG: 312 | End: 2020-09-22
Payer: MEDICARE

## 2020-09-22 LAB
ATRIAL RATE: 208 BPM
ATRIAL RATE: 48 BPM
ATRIAL RATE: 74 BPM
GLUCOSE SERPL-MCNC: 157 MG/DL (ref 65–140)
GLUCOSE SERPL-MCNC: 192 MG/DL (ref 65–140)
GLUCOSE SERPL-MCNC: 244 MG/DL (ref 65–140)
GLUCOSE SERPL-MCNC: 259 MG/DL (ref 65–140)
P AXIS: 118 DEGREES
P AXIS: 63 DEGREES
P AXIS: 65 DEGREES
PR INTERVAL: 176 MS
QRS AXIS: -7 DEGREES
QRS AXIS: 15 DEGREES
QRS AXIS: 2 DEGREES
QRSD INTERVAL: 118 MS
QRSD INTERVAL: 124 MS
QRSD INTERVAL: 126 MS
QT INTERVAL: 474 MS
QT INTERVAL: 480 MS
QT INTERVAL: 488 MS
QTC INTERVAL: 428 MS
QTC INTERVAL: 470 MS
QTC INTERVAL: 526 MS
SARS-COV-2 RNA RESP QL NAA+PROBE: NEGATIVE
T WAVE AXIS: 134 DEGREES
T WAVE AXIS: 142 DEGREES
T WAVE AXIS: 156 DEGREES
VENTRICULAR RATE: 48 BPM
VENTRICULAR RATE: 56 BPM
VENTRICULAR RATE: 74 BPM

## 2020-09-22 PROCEDURE — 82948 REAGENT STRIP/BLOOD GLUCOSE: CPT

## 2020-09-22 PROCEDURE — NC001 PR NO CHARGE: Performed by: INTERNAL MEDICINE

## 2020-09-22 PROCEDURE — 93306 TTE W/DOPPLER COMPLETE: CPT | Performed by: INTERNAL MEDICINE

## 2020-09-22 PROCEDURE — 97116 GAIT TRAINING THERAPY: CPT

## 2020-09-22 PROCEDURE — 97130 THER IVNTJ EA ADDL 15 MIN: CPT

## 2020-09-22 PROCEDURE — 93010 ELECTROCARDIOGRAM REPORT: CPT | Performed by: INTERNAL MEDICINE

## 2020-09-22 PROCEDURE — 97167 OT EVAL HIGH COMPLEX 60 MIN: CPT

## 2020-09-22 PROCEDURE — 97129 THER IVNTJ 1ST 15 MIN: CPT

## 2020-09-22 PROCEDURE — 97110 THERAPEUTIC EXERCISES: CPT

## 2020-09-22 PROCEDURE — 93306 TTE W/DOPPLER COMPLETE: CPT

## 2020-09-22 PROCEDURE — U0003 INFECTIOUS AGENT DETECTION BY NUCLEIC ACID (DNA OR RNA); SEVERE ACUTE RESPIRATORY SYNDROME CORONAVIRUS 2 (SARS-COV-2) (CORONAVIRUS DISEASE [COVID-19]), AMPLIFIED PROBE TECHNIQUE, MAKING USE OF HIGH THROUGHPUT TECHNOLOGIES AS DESCRIBED BY CMS-2020-01-R: HCPCS | Performed by: FAMILY MEDICINE

## 2020-09-22 PROCEDURE — 99232 SBSQ HOSP IP/OBS MODERATE 35: CPT | Performed by: FAMILY MEDICINE

## 2020-09-22 RX ORDER — INSULIN GLARGINE 100 [IU]/ML
5 INJECTION, SOLUTION SUBCUTANEOUS
Status: DISCONTINUED | OUTPATIENT
Start: 2020-09-22 | End: 2020-09-23 | Stop reason: HOSPADM

## 2020-09-22 RX ADMIN — PANTOPRAZOLE SODIUM 40 MG: 40 TABLET, DELAYED RELEASE ORAL at 05:03

## 2020-09-22 RX ADMIN — DICLOFENAC SODIUM 2 G: 10 GEL TOPICAL at 21:00

## 2020-09-22 RX ADMIN — ASPIRIN 81 MG CHEWABLE TABLET 81 MG: 81 TABLET CHEWABLE at 08:20

## 2020-09-22 RX ADMIN — LEVOTHYROXINE SODIUM 75 MCG: 75 TABLET ORAL at 05:03

## 2020-09-22 RX ADMIN — INSULIN GLARGINE 5 UNITS: 100 INJECTION, SOLUTION SUBCUTANEOUS at 21:00

## 2020-09-22 RX ADMIN — INSULIN LISPRO 2 UNITS: 100 INJECTION, SOLUTION INTRAVENOUS; SUBCUTANEOUS at 21:00

## 2020-09-22 RX ADMIN — HEPARIN SODIUM 5000 UNITS: 5000 INJECTION INTRAVENOUS; SUBCUTANEOUS at 05:03

## 2020-09-22 RX ADMIN — DICLOFENAC SODIUM 2 G: 10 GEL TOPICAL at 17:01

## 2020-09-22 RX ADMIN — BACITRACIN 1 SMALL APPLICATION: 500 OINTMENT TOPICAL at 17:01

## 2020-09-22 RX ADMIN — INSULIN LISPRO 2 UNITS: 100 INJECTION, SOLUTION INTRAVENOUS; SUBCUTANEOUS at 12:22

## 2020-09-22 RX ADMIN — PRAVASTATIN SODIUM 80 MG: 80 TABLET ORAL at 17:01

## 2020-09-22 RX ADMIN — CYANOCOBALAMIN TAB 500 MCG 1000 MCG: 500 TAB at 08:20

## 2020-09-22 RX ADMIN — ACETAMINOPHEN 650 MG: 325 TABLET, FILM COATED ORAL at 19:43

## 2020-09-22 RX ADMIN — DOCUSATE SODIUM 100 MG: 100 CAPSULE, LIQUID FILLED ORAL at 17:01

## 2020-09-22 RX ADMIN — DOCUSATE SODIUM 100 MG: 100 CAPSULE, LIQUID FILLED ORAL at 08:20

## 2020-09-22 RX ADMIN — INSULIN LISPRO 1 UNITS: 100 INJECTION, SOLUTION INTRAVENOUS; SUBCUTANEOUS at 17:01

## 2020-09-22 RX ADMIN — DICLOFENAC SODIUM 2 G: 10 GEL TOPICAL at 08:21

## 2020-09-22 RX ADMIN — DICLOFENAC SODIUM 2 G: 10 GEL TOPICAL at 12:23

## 2020-09-22 RX ADMIN — HEPARIN SODIUM 5000 UNITS: 5000 INJECTION INTRAVENOUS; SUBCUTANEOUS at 14:12

## 2020-09-22 RX ADMIN — HEPARIN SODIUM 5000 UNITS: 5000 INJECTION INTRAVENOUS; SUBCUTANEOUS at 21:00

## 2020-09-22 RX ADMIN — BACITRACIN 1 SMALL APPLICATION: 500 OINTMENT TOPICAL at 08:21

## 2020-09-22 RX ADMIN — INSULIN LISPRO 1 UNITS: 100 INJECTION, SOLUTION INTRAVENOUS; SUBCUTANEOUS at 08:21

## 2020-09-22 RX ADMIN — MAGNESIUM 64 MG (MAGNESIUM CHLORIDE) TABLET,DELAYED RELEASE 64 MG: at 08:21

## 2020-09-22 NOTE — ASSESSMENT & PLAN NOTE
Lab Results   Component Value Date    HGBA1C 6 9 (H) 02/16/2020     Will put on hold Januvia and Glucophage  Also check blood sugars before meals and before bedtime with insulin sliding scale    Recent Labs     09/21/20 2058 09/22/20  0728 09/22/20  1102 09/22/20  1546   POCGLU 213* 157* 259* 192*       Blood Sugar Average: Last 72 hrs:  (P) 202 492

## 2020-09-22 NOTE — PROGRESS NOTES
Progress Note - Cardiology   Claudeen Madeira 80 y o  female MRN: [id number]  [facility] Encounter: [id number]    Assessment/Plan:  80year old female with PMHx of HTN, CAD s/p PCI-RCA 2014, RCC s/p left nephrectomy admitted for mechanical fall with ecchymosis on left side of face  In ED she had an episode of unresponsiveness where she was also bradycardiac (HR in 40-50) and hypotensive with and thus admitted    Sinus bradycardia   - Latest HR of 70 this AM, several heartrate in the low 50's overnight, likely when patient was sleeping, patient reported no symptoms    - Considering her CAD has been stable, continue holding metoprolol     - In ED, patient became bradycardic to 40s with SBP of 70  Improved following fluid bolus  It is possible that her episode of unresponsiveness is due to the bradycardia  - Patient has history of hypothyroidism, currently taking levothyroxine  TSH wnl   - Continue to monitor on telemetry  - Event monitor for 2 weeks after discharge while off metoprolol to assess for any symptomatic or significant bradycardia that might necessitate a permanent pacemaker, she normally follows up with Dr Neftaly Antunez at [facility], see if they could order the event monitor       Coronary Artery Disease  - s/p stent in RCA in 2016  - currently stable, continue on aspirin and statin, discontinue beta blocker  -  ECHO 4852 - normal systolic function with EF of 55%, with mild aortic, mitral, and tricuspid regurgitation  - follow up repeat ECHO results; if ECHO normal likely can be discharged from cardiology perspective     Hypertension   - Patient is on metoprolol, lasix and amlodipine at home   - Discontinue metoprolol      Recurrent Mechanical Falls  - Patient had multiple falls, might benefit from PT/OT evaluation and rehab   - fall precautions       Elevated troponin  - Trended x4 - 0 14 --> 0 3 --> 0 32 --> 0 2  - Could be elevated due to chronic renal insufficiency or type 2 MI in setting of mechanical fall, patient denies any anginal symptoms  - stop trending    Subjective/Objective   Subjective: Patient was examined  No acute events overnight  Patient denies any falls, dizziness, lightheadness  No further episode of unresponsiveness overnight   Patient had heartrate in the low 50's a few times over night, but patient did not report any symptoms         Vitals: /67   Pulse 68   Temp 98 °F (36 7 °C)   Resp 12   Ht 5' 3" (1 6 m)   Wt 69 4 kg (153 lb)   SpO2 98%   BMI 27 10 kg/m²   Vitals:    09/21/20 0159 09/22/20 0600   Weight: 68 8 kg (151 lb 10 8 oz) 69 4 kg (153 lb)     Orthostatic Blood Pressures      Most Recent Value   Blood Pressure  154/67 filed at 09/22/2020 0730   Patient Position - Orthostatic VS  Lying filed at 09/21/2020 1414            Intake/Output Summary (Last 24 hours) at 9/22/2020 0911  Last data filed at 9/21/2020 1200  Gross per 24 hour   Intake 240 ml   Output 200 ml   Net 40 ml       Invasive Devices     Peripheral Intravenous Line            Peripheral IV 09/20/20 Left Antecubital 1 day                Review of Systems: General ROS: negative for - chills or fever  Respiratory ROS: no cough, shortness of breath, or wheezing  Cardiovascular ROS: no chest pain or dyspnea on exertion  Gastrointestinal ROS: no abdominal pain, change in bowel habits, or black or bloody stools  Neurological ROS: negative for - dizziness, headaches or weakness    Physical Exam: /67   Pulse 68   Temp 98 °F (36 7 °C)   Resp 12   Ht 5' 3" (1 6 m)   Wt 69 4 kg (153 lb)   SpO2 98%   BMI 27 10 kg/m²     General Appearance:    Alert,  no acute distres   Head:    Normocephalic, atraumatic, abrasion on forehead and above L eye    Eyes:    PERRL, , EOM's intact,   Ears:    Normal external ear canals, both ears   Neck:   Supple, normal range of motion    Lungs:     Clear to auscultation bilaterally, respirations unlabored    Heart:    Regular rate and rhythm, S1 and S2 normal, no murmur, rub   or gallop   Extremities:   Extremities normal, no edema   Skin:   Skin is warm, capillary refill takes less than 2 seconds  Neurologic:   Alert and oriented x3, no focal neurological deficits        Lab Results: I have personally reviewed pertinent lab results  Imaging: I have personally reviewed pertinent reports      EKG: reviewed

## 2020-09-22 NOTE — PHYSICAL THERAPY NOTE
Physical Therapy Treatment Note    Patient's Name: Chandrakant Hernandez  : 1/3/1932       09/22/20 1550   PT Last Visit   PT Visit Date 20   Pain Assessment   Pain Assessment Tool Pain Assessment not indicated - pt denies pain   Pain Score No Pain   Restrictions/Precautions   Weight Bearing Precautions Per Order No   Other Precautions Cognitive; Chair Alarm; Fall Risk   General   Chart Reviewed Yes   Family/Caregiver Present No   Cognition   Overall Cognitive Status Impaired   Arousal/Participation Alert; Cooperative   Attention Attends with cues to redirect   Orientation Level Oriented X4   Memory Decreased recall of precautions;Decreased recall of recent events;Decreased short term memory   Following Commands Follows one step commands with increased time or repetition   Comments Pt expressed concerns over being home alone and limited support system today  Bed Mobility   Additional Comments Pt OOB in chair upon PT arrival   Transfers   Sit to Stand 5  Supervision   Stand to Sit 5  Supervision   Additional Comments with RW   Ambulation/Elevation   Gait pattern Improper Weight shift;Decreased foot clearance; Foward flexed   Gait Assistance 5  Supervision   Additional items Assist x 1   Assistive Device Rolling walker   Distance 100 ftx2 with RW, seated rest break between trials, vitals stable  VC's for proximity to RW and pacing with rotational turns, increased lateral sway with rotational turns  Ambulation 50'x1 without AD, Dalia, poor balance, scissoring  Pt educated on RW use and in agreement  Balance   Static Sitting Good   Dynamic Sitting Fair   Static Standing Fair -   Dynamic Standing Fair -   Ambulatory Poor +   Activity Tolerance   Activity Tolerance Patient limited by fatigue   Nurse Made Aware RN updated  Chair alarm intact   Exercises   Hip Abduction Sitting;15 reps;AROM; Bilateral  (x2 sets)   Knee AROM Long Arc Katia Mock reps;AROM; Bilateral  (x2 sets)   Ankle Pumps Sitting;20 reps;AROM; Bilateral  (heel/toe raise)   Marching Sitting;20 reps;AROM; Bilateral  (x2 sets)   Assessment   Prognosis Good   Problem List Decreased strength;Decreased endurance; Impaired balance;Decreased mobility; Decreased cognition;Decreased safety awareness   Assessment Pt with improved activity tolerance this session, continues to demonstrate balance impairments and would recommend RW use, pt in agreement  Pt with limited family support and expressed concerns today over discharging home alone, discussed PT recommendation of rehab and pt in agreement today  Pt will benefit from continued skilled PT intervention during course of hospital stay to improve strength, endurance and balance to improve safety with functional mobility  Recommend IP rehab upon hospital D/C  Goals   Patient Goals "go to rehab"   STG Expiration Date 10/01/20   PT Treatment Day 1   Plan   Treatment/Interventions Functional transfer training;LE strengthening/ROM; Elevations; Therapeutic exercise; Endurance training;Cognitive reorientation;Equipment eval/education;Patient/family training;Bed mobility;Gait training   Progress Progressing toward goals   PT Frequency Other (Comment)  (3-5x/week)   Recommendation   PT Discharge Recommendation Post-Acute Rehabilitation Services   PT - OK to Discharge Yes  (to IP rehab)       Karen Jimenez, PT, DPT What Is The Reason For Today's Visit?: History of Basal Cell Carcinoma How Many Bccs Have You Had?: one

## 2020-09-22 NOTE — PLAN OF CARE
Problem: OCCUPATIONAL THERAPY ADULT  Goal: Performs self-care activities at highest level of function for planned discharge setting  See evaluation for individualized goals  Description: Treatment Interventions: ADL retraining, Functional transfer training, Endurance training, Cognitive reorientation, Patient/family training, Equipment evaluation/education, Compensatory technique education, Energy conservation, Activityengagement, Continued evaluation          See flowsheet documentation for full assessment, interventions and recommendations  Note: Limitation: Decreased ADL status, Decreased Safe judgement during ADL, Decreased cognition, Decreased endurance, Decreased UE strength, Decreased self-care trans, Decreased high-level ADLs  Prognosis: Fair  Assessment: Pt is a 80 y o  female who was admitted to [facility] on 9/20/2020 with Fall at home, subsequent encounter taking out trash, landing on left side, (possible syncopal episode per chart review) +left wrist pain, +vasovagal syncope, elevated troponin, HTN, bradyarrhythmia, type 2 diabetes, CKD, liver lesion right lobe    Pt's problem list also includes PMH of cholelithiasis, hyperlipidemia, weight loss, chest pain, CAD    At baseline pt was completing I with ADL's/IADl's, no AD with functional ambulation  Pt lives alone in a ranch style home with laundry in the basement and 3 YVES  Currently pt requires S/set-up UB, max a LB for overall ADLS and S with RW for functional mobility/transfers  Pt currently presents with impairments in the following categories -steps to enter environment, limited home support, difficulty performing ADLS, difficulty performing IADLS , limited insight into deficits and decreased initiation and engagement  activity tolerance, endurance, standing balance/tolerance, UE strength, memory, insight, safety , judgement , attention  and sequencing    These impairments, as well as pt's fatigue, pain, decreased caregiver support and risk for falls  limit pt's ability to safely engage in all baseline areas of occupation, includinggrooming, bathing, dressing, toileting, functional mobility/transfers, community mobility, laundry , driving, house maintenance, medication management, meal prep, cleaning and leisure activities  From OT standpoint, recommend STR if declines recommend 24/7 supervision and assistance (see cognitive assessment  results) upon D/C  OT will continue to follow to address the below stated goals        OT Discharge Recommendation: Post-Acute Rehabilitation Services  OT - OK to Discharge: (yes to STR , no to home)

## 2020-09-22 NOTE — PLAN OF CARE
Problem: Potential for Falls  Goal: Patient will remain free of falls  Description: INTERVENTIONS:  - Assess patient frequently for physical needs  -  Identify cognitive and physical deficits and behaviors that affect risk of falls    -  [place] fall precautions as indicated by assessment   - Educate patient/family on patient safety including physical limitations  - Instruct patient to call for assistance with activity based on assessment  - Modify environment to reduce risk of injury  - Consider OT/PT consult to assist with strengthening/mobility  Outcome: Progressing     Problem: Prexisting or High Potential for Compromised Skin Integrity  Goal: Skin integrity is maintained or improved  Description: INTERVENTIONS:  - Identify patients at risk for skin breakdown  - Assess and monitor skin integrity  - Assess and monitor nutrition and hydration status  - Monitor labs   - Assess for incontinence   - Turn and reposition patient  - Assist with mobility/ambulation  - Relieve pressure over bony prominences  - Avoid friction and shearing  - Provide appropriate hygiene as needed including keeping skin clean and dry  - Evaluate need for skin moisturizer/barrier cream  - Collaborate with interdisciplinary team   - Patient/family teaching  - Consider wound care consult   Outcome: Progressing     Problem: CARDIOVASCULAR - ADULT  Goal: Maintains optimal cardiac output and hemodynamic stability  Description: INTERVENTIONS:  - Monitor I/O, vital signs and rhythm  - Monitor for S/S and trends of decreased cardiac output  - Administer and titrate ordered vasoactive medications to optimize hemodynamic stability  - Assess quality of pulses, skin color and temperature  - Assess for signs of decreased coronary artery perfusion  - Instruct patient to report change in severity of symptoms  Outcome: Progressing  Goal: Absence of cardiac dysrhythmias or at baseline rhythm  Description: INTERVENTIONS:  - Continuous cardiac monitoring, vital signs, obtain 12 lead EKG if ordered  - Administer antiarrhythmic and heart rate control medications as ordered  - Monitor electrolytes and administer replacement therapy as ordered  Outcome: Progressing     Problem: SKIN/TISSUE INTEGRITY - ADULT  Goal: Skin integrity remains intact  Description: INTERVENTIONS  - Identify patients at risk for skin breakdown  - Assess and monitor skin integrity  - Assess and monitor nutrition and hydration status  - Monitor labs (i e  albumin)  - Assess for incontinence   - Turn and reposition patient  - Assist with mobility/ambulation  - Relieve pressure over bony prominences  - Avoid friction and shearing  - Provide appropriate hygiene as needed including keeping skin clean and dry  - Evaluate need for skin moisturizer/barrier cream  - Collaborate with interdisciplinary team (i e  Nutrition, Rehabilitation, etc )   - Patient/family teaching  Outcome: Progressing     Problem: MUSCULOSKELETAL - ADULT  Goal: Maintain or return mobility to safest level of function  Description: INTERVENTIONS:  - Assess patient's ability to carry out ADLs; assess patient's baseline for ADL function and identify physical deficits which impact ability to perform ADLs (bathing, care of mouth/teeth, toileting, grooming, dressing, etc )  - Assess/evaluate cause of self-care deficits   - Assess range of motion  - Assess patient's mobility  - Assess patient's need for assistive devices and provide as appropriate  - Encourage maximum independence but intervene and supervise when necessary  - Involve family in performance of ADLs  - Assess for home care needs following discharge   - Consider OT consult to assist with ADL evaluation and planning for discharge  - Provide patient education as appropriate  Outcome: Progressing     Problem: Nutrition/Hydration-ADULT  Goal: Nutrient/Hydration intake appropriate for improving, restoring or maintaining nutritional needs  Description: Monitor and assess patient's nutrition/hydration status for malnutrition  Collaborate with interdisciplinary team and initiate plan and interventions as ordered  Monitor patient's weight and dietary intake as ordered or per policy  Utilize nutrition screening tool and intervene as necessary  Determine patient's food preferences and provide high-protein, high-caloric foods as appropriate       INTERVENTIONS:  - Monitor oral intake, urinary output, labs, and treatment plans  - Assess nutrition and hydration status and recommend course of action  - Evaluate amount of meals eaten  - Assist patient with eating if necessary   - Allow adequate time for meals  - Recommend/ encourage appropriate diets, oral nutritional supplements, and vitamin/mineral supplements  - Order, calculate, and assess calorie counts as needed  - Recommend, monitor, and adjust tube feedings and TPN/PPN based on assessed needs  - Assess need for intravenous fluids  - Provide specific nutrition/hydration education as appropriate  - Include patient/family/caregiver in decisions related to nutrition  Outcome: Progressing

## 2020-09-22 NOTE — CASE MANAGEMENT
Patient requested referral to  HC  Her pcp is Dr Selvin Ramey  Referral sent  [facility] accepted for [facility] 9/23

## 2020-09-22 NOTE — OCCUPATIONAL THERAPY NOTE
Occupational Therapy Evaluation/progress note     Patient Name: Lesia Mansfield  TIDCL'C Date: 9/22/2020  Problem List  Principal Problem:    Fall at home, subsequent encounter  Active Problems:    Syncope and collapse    Hypothyroidism    CKD (chronic kidney disease)    Hypertension    Elevated troponin    Type 2 diabetes mellitus, without long-term current use of insulin (HCC)    Liver lesion, right lobe    Past Medical History  Past Medical History:   Diagnosis Date    Acquired absence of kidney     Frequency of micturition     GERD (gastroesophageal reflux disease)     Hypertension     Malignant neoplasm of left kidney, except renal pelvis (HCC)     Nocturia     Personal history of other malignant neoplasm of kidney     Renal mass, left     UTI (urinary tract infection)      Past Surgical History  Past Surgical History:   Procedure Laterality Date    RADICAL NEPHRECTOMY Left 2013 09/22/20 1358   OT Last Visit   OT Visit Date 09/22/20   Note Type   Note type Eval/Treat   Restrictions/Precautions   Weight Bearing Precautions Per Order No   Other Precautions Cognitive; Chair Alarm; Bed Alarm;Telemetry; Fall Risk;Pain   Pain Assessment   Pain Assessment Tool Pain Assessment not indicated - pt denies pain   Pain Score No Pain at rest, min pain with left wrist ROM during functional tasks  Home Living   Type of [address] [place] [address] One level;Stairs to enter with rails; Laundry in basement  (ranch style home )   Bathroom Shower/Tub Tub/shower unit   Tyler Walker;Cane;Life alert   Prior Function   Level of Lamb Independent with ADLs and functional mobility   Lives With Alone   Receives Help From Other (Comment)  (pt reports limited social support, +granddaughter however pt states "she has 3 little ones and is very busy, not sure she has time")   ADL [address] [email or web address] in the last 6 months 5 to [employer] 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)   Lifestyle   Autonomy I with ADL's/IADL's, pt reports I ADL's getting more difficult recently, no AD with functional ambulation  Reciprocal Relationships granddaughter    Service to Others retired   Intrinsic Gratification TV   Psychosocial   Psychosocial (WDL) [address]  [address] [place] [address] [address]  Moderate Assistance   [address]  [address] 2  Maximal Assistance   LB Dressing Deficit Don/doff R sock; Don/doff L sock  (unable to reach feet 2* to fatigue)   Toileting Assistance  4  Minimal Assistance   Bed Mobility   Supine to Sit Unable to assess   Sit to Supine Unable to assess   Additional Comments pt received seated in chair, left in chair with all needs met and alarm activated   Transfers   Sit to Stand 5  Supervision   Additional items Assist x 1   Stand to Sit 5  Supervision   Additional items Assist x 1   Functional Mobility   Functional Mobility 5  Supervision   Additional Comments S with RW short distance functional mobility chair<>hallway, no LOB noted     Additional items Rolling walker   Balance   Static Sitting Good   Dynamic Sitting Fair   Static Standing Fair -   Dynamic Standing Fair -   Ambulatory Poor +   Activity Tolerance   Activity Tolerance Patient limited by fatigue   Medical Staff Made Aware RN   Nurse Made Aware RN cleared pt for therapy   RUE Assessment   RUE Assessment WFL   LUE Assessment   LUE Assessment X  (left  pain with decreased strength Right>left,educated pt on HEP with slow, gentle AROM exercise to decrease pain/stiffness-pt minimally receptive  )   Hand Function   Gross Motor Coordination Functional   Fine Motor Coordination Functional   Vision-Basic Assessment   Current Vision No visual deficits   Vision - Complex Assessment   Acuity Able to read normal print without difficulty   Cognition   Overall Cognitive Status Impaired   Arousal/Participation Alert; Responsive   Attention Attends with cues to redirect   Orientation Level Oriented X4   Memory Decreased recall of precautions;Decreased recall of recent events;Decreased short term memory   Following Commands Follows one step commands with increased time or repetition   Cognition Assessment Tools ACLS/MOCA   Score 3 8, 18/30   Assessment   Limitation Decreased ADL status; Decreased Safe judgement during ADL;Decreased cognition;Decreased endurance;Decreased UE strength;Decreased self-care trans;Decreased high-level ADLs   Prognosis Fair   Assessment Pt is a 80 y o  female who was [facility] with Fall at home, subsequent encounter taking out trash, landing on left side, (possible syncopal episode per chart review) +left wrist pain, +vasovagal syncope, elevated troponin, HTN, bradyarrhythmia, type 2 diabetes, CKD, liver lesion right lobe    Pt's problem list also includes PMH of cholelithiasis, hyperlipidemia, weight loss, chest pain, CAD    At baseline pt was completing I with ADL's/IADl's, no AD with functional ambulation  Pt lives alone in a ranch style home with laundry in the basement and 3 YVES  Currently pt requires S/set-up UB, max a LB for overall ADLS and S with RW for functional mobility/transfers  Pt currently presents with impairments in the following categories -steps to enter environment, limited home support, difficulty performing ADLS, difficulty performing IADLS , limited insight into deficits and decreased initiation and engagement  activity tolerance, endurance, standing balance/tolerance, UE strength, memory, insight, safety , judgement , attention  and sequencing    These impairments, as well as pt's fatigue, pain, decreased caregiver support and risk for falls  limit pt's ability to safely engage in all baseline areas of occupation, including[employer], bathing, dressing, toileting, functional mobility/transfers, community mobility, laundry , driving, house maintenance, medication management, meal prep, cleaning and leisure activities  From OT standpoint, recommend STR if declines recommend 24/7 supervision and assistance (see cognitive assessment results) upon D/C  OT will continue to follow to address the below stated goals  Goals   Patient Goals "get more help at home"   LTG Time Frame 10-14   Long Term Goal #1 see goals below   Plan   Treatment Interventions ADL retraining;Functional transfer training; Endurance training;Cognitive reorientation;Patient/family training;Equipment evaluation/education; Compensatory technique education; Energy conservation; Activityengagement;Continued evaluation   Goal Expiration Date 10/06/20   OT Treatment Day 1   OT Frequency 3-5x/wk   Additional Treatment Session   Start Time 1325   End Time 1400   Treatment Assessment Pt seen for an additional OT treatment session with focus on cognitive assessments ACLS and MOCA testing  Pt scored a 3 8 on ACLS indicating 24/7 supervision and assistance, MOCA score of 18/30 indicates a borderline mild/moderate cognitive deficit  Pt with errors with immediate recall/STM, attention/focus to complete tasks, and executive function  From OT standpoint recommend 24/7 supervision and assistance upon discharge  CM and SLIM attending aware during case management rounds  Pt appears receptive to STR at this time, team notified during CM rounds  Pt continues to function below baseline levels, occupational performance remains limited with the above noted deficits  Continue plan of care to maximize functional I with all ADL's/mobility tasks     Recommendation   OT Discharge Recommendation Post-Acute Rehabilitation Services   OT - OK to Discharge   (yes to STR , no to home)   Additional Comments  pt requires 24/7 supervision and assistance -see below for cognitive assessment results   Barthel Index   Feeding 10   Bathing 0   Grooming Score 5   Dressing Score 5   Bladder Score 10   Bowels Score 10   Toilet Use Score 5   Transfers (Bed/Chair) Score 10   Mobility (Level Surface) Score 0   Stairs Score 0   Barthel Index Score 55   Modified Bonner Scale   Modified Harsh Scale 3       Occupational Therapy Goals:    *S with bed mobility to engage in functional tasks  *S  Adl's after setup with use of AE PRN  *S toileting and clothing management   *S functional mobility and transfers to/from all surfaces with Fair + dynamic balance and safety for participation in dynamic adls and iadl tasks   *Demonstrate good carryover with safe use of RW during functional tasks   *Assess DME needs   *Increase activity tolerance to 25-30 minutes for participation in adls and enjoyable activities  *Pt to participate in further cognitive testing with good attention and participation to assist with safe d/c recommendations  *Increase standing tolerance to 6-8 minutes to engage in self care tasks sink side  *S with HEP with Left wrist ROM/strength to decrease pain/increase  strength to [facility] for ADL Rich  3 8    Administered Álvaro Erps Cognitive Level Screen (ACLS)  PT scored 3 8/6 0 indicating 24 house supervision is recommended to gather supplies needed for ADLs, to check results and to remove dangerous objects  Behavior:  May not ask for assistance  May abandon tasks  May be disoriented to day and date and will likely not retain information when told  Needs frequent redirection to tasks  Pace is slow & univariant  May insist on engaging in activity with potential harm (ie: driving)  Grooming:  Wagoner, brushes hair or shaves until all areas are covered  Recognizes completion of tasks  May attempt to style hair but may miss back of head  May use all available products  Recommend checking every few minutes if left alone  Dressing:  May learn to dress at a particular time of the day    Dons common articles of clothing slowly with some errors in sequence  Recognizes when dressing is completed  May stop when a problem occurs  May argue with caregiver suggestions to change selections  Bathing:  Covers all visible body parts with soap and rinses it off  May forget hidden parts or steps  Is aware of task completion  Expect waste if allowed to measure amounts  Do not leave unsupervised for more than a few minutes  Walking/Exercising:  Ambulates within familiar living area or short distances like the backyard  May be unable to retrace steps if lost   May run/walk until tired or stopped  Impulsive actions may result in falls  Eating:   May learn to present self at dining area at regular meal times or follow routine for passing dishes  Does not engage in social conversation at the table  Check food and beverage temperatures  Cannot follow dietary restrictions  Toileting:  May complete sequence of toileting with 1 or 2 steps left out  May forget to zip pants or wash hands  Medications: May be trained to take medications at a particular time of the day  May be able to tell when medications have been altered  Store medications in secure location away from the individual in child proof containers  Supervise to ensure compliance  Use of adaptive devices:  May need frequent redirection for safe carryover in use of assistive device  Do not expect to recall safety precautions  Housekeeping:  No awareness of need for housekeeping  May follow directed sequence of actions like putting away dishes  Do not expect effective results  Food Preparation:  Does not plan for food  May be trained not to eat from refrigerator or present self in dining area  May be able to make a simple sandwich with 1-2 ingredients  Watch for distractibility  Restrict access to sharp tools  Spending money:  May be trained to do the simple steps in a transaction with a fixed amount of money  Recognizes the completion of transaction    May be trained to ask for assistance  If given money, may give away or lose it  Shopping: Follows a guide to shopping areas  Looks into windows or at displays with no intent to purchase  May take items without paying  May fail to notice price or if they have enough money to pay  Do not leave the individual unsupervised in shopping areas  Laundry:   May fold all available items or drape all laundry on clothes line without redirection  Traveling: Able to sit in car for periods up to 1 hour  May express awareness of destination after arrival   Accompany individual on all trips  Telephoning:  May be trained to complete a simple telephone call involving a new number and procedure  May be unable to determine when to call  Does not  when emergency exists and may call 911 at inappropriate times  Driving:  Should not operate a motor vehicle  PT SEEN FOR TEJA COGNITIVE ASSESSMENT  SCORED   INDICATING __________borderline mild/moderate_ COGNITIVE IMPAIRMENT FOR AGE/EDUCATION  SCORES ARE AS FOLLOWS:    VISUOSPATIAL/EXECUTIVE FUNCTION: 2/5  Pt was unable to complete trail  Pt was unable to copy cube  Pt was able to draw a clock, accurately place the numbers, and  Unable to properly place the hands at ten past eleven  NAMIN/3  Pt able to name 3/3 animals  ATTENTION: /  Pt was able to repeat sequence of numbers forwards and backwards  Pt able to attend to sequence of letters  Pt was able to correctly subtract 7 from 100 5/5 times  LANGUAGE: 1/3  Pt was able to repeat sentences back to therapist  Pt was able to produce N of words starting with the letter F in 1 minute  ABSTRACTION: 1/2  Pt was able to identify the similarity of two items x2 trials  DELAYED RECALL: 1/5  Pt recalled 5/5 words without cues  Pt recalled 0/5 words with category cue  Pt recalled 2/5 words with multiple choice options  ORIENTATION:   Pt is oriented to date, month, year, day, place and city       Moira Ahumada Aubrie MOT, OTR/L

## 2020-09-22 NOTE — PLAN OF CARE
Problem: PHYSICAL THERAPY ADULT  Goal: Performs mobility at highest level of function for planned discharge setting  See evaluation for individualized goals  Description: Treatment/Interventions: (P) Functional transfer training, LE strengthening/ROM, Elevations, Therapeutic exercise, Endurance training, Cognitive reorientation, Patient/family training, Equipment eval/education, Gait training, Bed mobility          See flowsheet documentation for full assessment, interventions and recommendations  Outcome: Progressing  Note: Prognosis: Good  Problem List: Decreased strength, Decreased endurance, Impaired balance, Decreased mobility, Decreased cognition, Decreased safety awareness  Assessment: Pt with improved activity tolerance this session, continues to demonstrate balance impairments and would recommend RW use, pt in agreement  Pt with limited family support and expressed concerns today over discharging home alone, discussed PT recommendation of rehab and pt in agreement today  Pt will benefit from continued skilled PT intervention during course of hospital stay to improve strength, endurance and balance to improve safety with functional mobility  Recommend IP rehab upon hospital D/C  Barriers to Discharge: (P) Decreased caregiver support     PT Discharge Recommendation: Post-Acute Rehabilitation Services     PT - OK to Discharge: Yes(to IP rehab)    See flowsheet documentation for full assessment

## 2020-09-22 NOTE — PROGRESS NOTES
Progress Note - Edu Comment 1/3/1932, 80 y o  female MRN: [id number]    Unit/Bed#: [facility] 834-01 Encounter: [id number]    Primary Care Provider: Ezra Barragan MD   Date and time admitted to hospital: 9/20/2020  7:44 PM        * Fall at home, subsequent encounter  Assessment & Plan  · Pt/OT  · Supportive care  · Fall precautions    Liver lesion, right lobe  Assessment & Plan  Incidental finding on CT scan (3 4 cm round subcapsular hypodense lesion in right lower lobe of liver incompletely characterize); Outpatient MRI when stable    Type 2 diabetes mellitus, without long-term current use of insulin [facility])  Assessment & Plan  Lab Results   Component Value Date    HGBA1C 6 9 (H) 02/16/2020     Will put on hold Januvia and Glucophage  Also check blood sugars before meals and before bedtime with insulin sliding scale  Recent Labs     09/21/20  2058 09/22/20  0728 09/22/20  1102 09/22/20  1546   POCGLU 213* 157* 259* 192*       Blood Sugar Average: Last 72 hrs:  (P) 202 375    Elevated troponin  Assessment & Plan  Elevated troponin was assessed prior by cardiology and may be secondary to chronic renal insufficiency  No complaints of chest pain  However with a history of CAD, will trend x3  Hypertension  Assessment & Plan  Currently on metoprolol  Furosemide on hold for now  CKD (chronic kidney disease)  Assessment & Plan  Although mildly elevated currently at 1 59 from previous 1 43, this has been within her limits  Current GFR at 29  Hypothyroidism  Assessment & Plan  Continue levothyroxine 75 mcg daily  Syncope and collapse  Assessment & Plan  · Syncope in the ED just prior to discharge  · troponins increased  · Denies CP, palpitations or SOB  · intermittent Bradycardia on telemetry    · Defer heparin at this time due no CP or SOB  · Consult with cardiology for possible pacer or stress/cath      VTE Pharmacologic Prophylaxis:   Pharmacologic: Heparin  Mechanical VTE Prophylaxis in Place: Yes    Patient Centered Rounds: I have performed bedside rounds with nursing staff today  Discussions with Specialists or Other Care Team Provider:     Education and Discussions with Family / Patient:  Patient    Time Spent for Care: 30 minutes  More than 50% of total time spent on counseling and coordination of care as described above  Current Length of Stay: 1 day(s)    Current Patient Status: Inpatient   Certification Statement: The patient will continue to require additional inpatient hospital stay due to Pending rehab    Discharge Plan:     Code Status: Level 3 - DNAR and DNI      Subjective:   Patient seen and examined  Discussed with case management  Initially patient refused rehab but right now she is concerned about going home  Patient is agreeable for rehab    Objective:     Vitals:   Temp (24hrs), Av 5 °F (36 4 °C), Min:97 °F (36 1 °C), Max:98 °F (36 7 °C)    Temp:  [97 °F (36 1 °C)-98 °F (36 7 °C)] 97 6 °F (36 4 °C)  HR:  [64-71] 65  Resp:  [12-20] 18  BP: (101-157)/(35-80) 149/80  SpO2:  [96 %-98 %] 97 %  Body mass index is 27 1 kg/m²  Input and Output Summary (last 24 hours): Intake/Output Summary (Last 24 hours) at 2020 1907  Last data filed at 2020 1853  Gross per 24 hour   Intake 220 ml   Output 450 ml   Net -230 ml       Physical Exam:     Physical Exam  Constitutional:       Appearance: Normal appearance  HENT:      Head: Normocephalic  Comments: Ecchymosis and abrasion noted on the face     Nose: No congestion  Mouth/Throat:      Mouth: Mucous membranes are moist    Eyes:      Pupils: Pupils are equal, round, and reactive to light  Neck:      Musculoskeletal: Normal range of motion  Cardiovascular:      Rate and Rhythm: Normal rate and regular rhythm  Pulses: Normal pulses  Pulmonary:      Effort: Pulmonary effort is normal       Comments: Decreased breath sounds but  Abdominal:      General: Abdomen is flat     Musculoskeletal: Normal range of motion  Skin:     General: Skin is warm  Neurological:      Mental Status: She is alert  Additional Data:     Labs:    Results from last 7 days   Lab Units 09/21/20  0904 09/21/20  0503 09/20/20  2231   WBC Thousand/uL  --  10 22* 10 44*   HEMOGLOBIN g/dL  --  11 4* 11 8   HEMATOCRIT %  --  36 1 38 3   PLATELETS Thousands/uL 282 266 271   NEUTROS PCT %  --   --  74   LYMPHS PCT %  --   --  14   MONOS PCT %  --   --  10   EOS PCT %  --   --  1     Results from last 7 days   Lab Units 09/21/20  0503   POTASSIUM mmol/L 4 7   CHLORIDE mmol/L 103   CO2 mmol/L 26   BUN mg/dL 36*   CREATININE mg/dL 1 41*   CALCIUM mg/dL 9 1   ALK PHOS U/L 116   ALT U/L 13   AST U/L 14           * I Have Reviewed All Lab Data Listed Above  * Additional Pertinent Lab Tests Reviewed:  Abdoulayeshelley 66 Admission Reviewed    Imaging:    Imaging Reports Reviewed Today Include:   Imaging Personally Reviewed by Myself Includes:      Recent Cultures (last 7 days):           Last 24 Hours Medication List:   Current Facility-Administered Medications   Medication Dose Route Frequency Provider Last Rate    acetaminophen  650 mg Oral Q6H PRN Mark Dawn MD      aluminum-magnesium hydroxide-simethicone  15 mL Oral Q6H PRN Mark Dawn MD      aspirin  81 mg Oral Daily Mark Dawn MD      bacitracin  1 small application Topical BID Mark Dawn MD      cyanocobalamin  1,000 mcg Oral Daily Mark Dawn MD      diclofenac sodium  2 g Topical 4x Daily Sergei Grey MD      docusate sodium  100 mg Oral BID Sergei Grey MD      heparin (porcine)  5,000 Units Subcutaneous [facility] Mark Dawn MD      insulin lispro  1-5 Units Subcutaneous TID AC Sergei Grey MD      insulin lispro  1-5 Units Subcutaneous HS Sergei Grey MD      levothyroxine  75 mcg Oral Early Morning Mark Dawn MD      magnesium chloride  64 mg Oral Daily Mark Dawn MD      ondansetron  4 mg Intravenous Q6H PRN Jessica Garza MD      pantoprazole  40 mg Oral Early Morning Jessica Garza MD      pravastatin  80 mg Oral Daily With Yahaira Roberson MD      senna  2 tablet Oral Daily PRN Tika Parisi MD          Today, Patient Was Seen By: Johanna Cook MD    ** Please Note: Dictation voice to text software may have been used in the creation of this document   **

## 2020-09-23 VITALS
RESPIRATION RATE: 18 BRPM | OXYGEN SATURATION: 98 % | TEMPERATURE: 97.4 F | SYSTOLIC BLOOD PRESSURE: 123 MMHG | BODY MASS INDEX: 27.19 KG/M2 | HEART RATE: 68 BPM | DIASTOLIC BLOOD PRESSURE: 76 MMHG | HEIGHT: 63 IN | WEIGHT: 153.44 LBS

## 2020-09-23 LAB — GLUCOSE SERPL-MCNC: 148 MG/DL (ref 65–140)

## 2020-09-23 PROCEDURE — 82948 REAGENT STRIP/BLOOD GLUCOSE: CPT

## 2020-09-23 PROCEDURE — 99239 HOSP IP/OBS DSCHRG MGMT >30: CPT | Performed by: FAMILY MEDICINE

## 2020-09-23 RX ORDER — DOCUSATE SODIUM 100 MG/1
100 CAPSULE, LIQUID FILLED ORAL 2 TIMES DAILY
Qty: 10 CAPSULE | Refills: 0
Start: 2020-09-23 | End: 2022-01-01 | Stop reason: SDUPTHER

## 2020-09-23 RX ADMIN — HEPARIN SODIUM 5000 UNITS: 5000 INJECTION INTRAVENOUS; SUBCUTANEOUS at 05:26

## 2020-09-23 RX ADMIN — CYANOCOBALAMIN TAB 500 MCG 1000 MCG: 500 TAB at 08:11

## 2020-09-23 RX ADMIN — MAGNESIUM 64 MG (MAGNESIUM CHLORIDE) TABLET,DELAYED RELEASE 64 MG: at 08:12

## 2020-09-23 RX ADMIN — DOCUSATE SODIUM 100 MG: 100 CAPSULE, LIQUID FILLED ORAL at 08:11

## 2020-09-23 RX ADMIN — ASPIRIN 81 MG CHEWABLE TABLET 81 MG: 81 TABLET CHEWABLE at 08:11

## 2020-09-23 RX ADMIN — LEVOTHYROXINE SODIUM 75 MCG: 75 TABLET ORAL at 05:26

## 2020-09-23 RX ADMIN — PANTOPRAZOLE SODIUM 40 MG: 40 TABLET, DELAYED RELEASE ORAL at 05:26

## 2020-09-23 RX ADMIN — BACITRACIN 1 SMALL APPLICATION: 500 OINTMENT TOPICAL at 08:11

## 2020-09-23 RX ADMIN — DICLOFENAC SODIUM 2 G: 10 GEL TOPICAL at 08:12

## 2020-09-23 NOTE — ASSESSMENT & PLAN NOTE
· Pt/OT  · Supportive care  · Fall precautions  · Patient will be discharged to inpatient skilled facility for rehab

## 2020-09-23 NOTE — DISCHARGE SUMMARY
Discharge- Marah Dale 1/3/1932, 80 y o  female MRN: [id number]    Unit/Bed#: [facility] 834-01 Encounter: [id number]    Primary Care Provider: Etienne Hager MD   Date and time admitted to hospital: 9/20/2020  7:44 PM        * Fall at home, subsequent encounter  Assessment & Plan  · Pt/OT  · Supportive care  · Fall precautions  · Patient will be discharged to inpatient skilled facility for rehab    Liver lesion, right lobe  Assessment & Plan  Incidental finding on CT scan (3 4 cm round subcapsular hypodense lesion in right lower lobe of liver incompletely characterize); Outpatient MRI when stable    Type 2 diabetes mellitus, without long-term current use of insulin [facility])  Assessment & Plan  Lab Results   Component Value Date    HGBA1C 6 9 (H) 02/16/2020     Will put on hold Januvia and Glucophage  Also check blood sugars before meals and before bedtime with insulin sliding scale  Recent Labs     09/22/20  1102 09/22/20  1546 09/22/20  2037 09/23/20  0725   POCGLU 259* 192* 244* 148*       Blood Sugar Average: Last 72 hrs:  (P) 201 1   Continue with the oral hypoglycemic agents hemoglobin A1c is 6 9    Elevated troponin  Assessment & Plan  Elevated troponin was assessed prior by cardiology and may be secondary to chronic renal insufficiency  No complaints of chest pain  Cardiology evaluation appreciated  Follow-up with the primary cardiologist  No further cardiac workup in the hospital    Hypertension  Assessment & Plan  Metoprolol on hold secondary to bradycardia    CKD (chronic kidney disease)  Assessment & Plan  Although mildly elevated currently at 1 59 from previous 1 43, this has been within her limits  Hypothyroidism  Assessment & Plan  Continue levothyroxine 75 mcg daily  Syncope and collapse  Assessment & Plan  · Syncope in the ED just prior to discharge  · troponins increased  · Denies CP, palpitations or SOB  · intermittent Bradycardia on telemetry    · Defer heparin at this time due no CP or SOB  · Consult with cardiology for possible pacer or stress/cath          Discharging Physician / Practitioner: Pauline Johnson MD  PCP: Kenton Yu MD  Admission Date:   Admission Orders (From admission, onward)     Ordered        09/21/20 0101  Inpatient Admission  Once                   Discharge Date: 09/23/20    Resolved Problems  Date Reviewed: 9/23/2020    None          Consultations During Hospital Stay:  · Cardiology    Procedures Performed:   ·     Significant Findings / Test Results:   CT abdomen and pelvis-1  No acute traumatic injury identified within chest, abdomen and pelvis  2   Diffuse scattered groundglass nodules within the peripheral lungs which may be due to atypical infection  3   Coronary artery calcifications  · 4   3 4 cm round subcapsular hypodense lesion in the right lobe of the liver, incompletely characterized  Further evaluation with MRI of the abdomen is recommended  CT chest abdomen pelvis wo contrast   Final Result by Dontrell Soto MD (09/21 0111)      1  No acute traumatic injury identified within chest, abdomen and pelvis  2   Diffuse scattered groundglass nodules within the peripheral lungs which may be due to atypical infection  3   Coronary artery calcifications  4   3 4 cm round subcapsular hypodense lesion in the right lobe of the liver, incompletely characterized  Further evaluation with MRI of the abdomen is recommended  Workstation performed: [id number]         XR wrist 3+ views LEFT   Final Result by Zenobia Brady MD (09/21 4962)      No acute osseous abnormality  Degenerative changes as described  Workstation performed: [id number]         XR hand 3+ views LEFT   Final Result by Zenobia Brady MD (09/21 3944)      No acute osseous abnormality  Degenerative changes as described              Workstation performed: [id number]         CT head without contrast   Final Result by Estell Frankel, MD (09/20 2054)      No acute intracranial abnormality  There is an unchanged, chronic left frontoparietal lobe infarct (series 2 image 26 )                  Workstation performed: [id number]         CT spine cervical without contrast   Final Result by Annamaria Gardner MD (09/20 9002)      No cervical spine fracture or traumatic malalignment  Workstation performed: [id number]           Incidental Findings:   · 3 4 cm the subscapular hypodense lesion in the right lobe of the liver-MRI of the abdomen as outpatient    Test Results Pending at Discharge (will require follow up): Outpatient Tests Requested:  ·     Complications:   none    Reason for Admission:  Syncope    Hospital Course:     Joan Leong is a 80 y o  female patient who originally presented to the hospital on 9/20/2020 due to a syncope event at home  It appears that patient was picking up her garbage from floor and then she fell down  Patient believes she may have blacked out  Noted to be hypotensive and improved with IV hydration  Patient noted to have facial trauma and was admitted to the hospital for further evaluation  Patient was evaluated by Cardiology given persistent bradycardia  Her metoprolol dose was discontinued  Patient remained well after discontinuation of the metoprolol and her blood pressure remained stable  Patient also noted to have mild troponin elevation  This is felt to be secondary to her chronic kidney disease  Cardiology recommending an outpatient Holter monitoring  Cardiology service contacted her outpatient cardiologist and they will be arranging the Holter monitoring as an outpatient  PT OT evaluated the patient  Patient at this time is willing to go to the rehab and patient was discharged to rehab in a stable condition on 9/23/2020  For details refer to the chart      Please see above list of diagnoses and related plan for additional information       Condition at Discharge: good     Discharge Day Visit / Exam: Subjective:  Patient seen and examined  No specific complaints offered  Patient is ready to go to rehab  Vitals: Blood Pressure: 123/76 (09/23/20 0728)  Pulse: 68 (09/22/20 2222)  Temperature: (!) 97 4 °F (36 3 °C) (09/23/20 0728)  Temp Source: Oral (09/22/20 2222)  Respirations: 18 (09/23/20 0728)  Height: 5' 3" (160 cm) (09/21/20 0159)  Weight - Scale: 69 6 kg (153 lb 7 oz) (09/23/20 0600)  SpO2: 98 % (09/22/20 2222)  Exam:   Physical Exam  Constitutional:       General: She is not in acute distress  Appearance: Normal appearance  She is not ill-appearing  HENT:      Head: Normocephalic  Comments: Abrasion of the face noted     Nose: No congestion or rhinorrhea  Mouth/Throat:      Pharynx: No oropharyngeal exudate or posterior oropharyngeal erythema  Eyes:      General:         Right eye: No discharge  Left eye: No discharge  Cardiovascular:      Rate and Rhythm: Normal rate and regular rhythm  Heart sounds: No murmur  No friction rub  Pulmonary:      Effort: Pulmonary effort is normal       Breath sounds: Normal breath sounds  Abdominal:      General: Abdomen is flat  There is no distension  Palpations: Abdomen is soft  Musculoskeletal: Normal range of motion  General: No swelling  Skin:     General: Skin is warm and dry  Neurological:      Mental Status: She is alert  Cranial Nerves: No cranial nerve deficit  Sensory: No sensory deficit  Discussion with Family-grand daughter     Discharge instructions/Information to patient and family:   See after visit summary for information provided to patient and family  Provisions for Follow-Up Care:  See after visit summary for information related to follow-up care and any pertinent home health orders        Disposition:     Michael Blacno (see below)    For Discharges to [facility] SNF:   · [place] Readmission:  none Discharge Statement:  I spent 45 minutes discharging the patient  This time was spent on the day of discharge  I had direct contact with the patient on the day of discharge  Greater than 50% of the total time was spent examining patient, answering all patient questions, arranging and discussing plan of care with patient as well as directly providing post-discharge instructions  Additional time then spent on discharge activities  Discharge Medications:  See after visit summary for reconciled discharge medications provided to patient and family        ** Please Note: This note has been constructed using a voice recognition system **

## 2020-09-23 NOTE — ASSESSMENT & PLAN NOTE
Elevated troponin was assessed prior by cardiology and may be secondary to chronic renal insufficiency  No complaints of chest pain      Cardiology evaluation appreciated  Follow-up with the primary cardiologist  No further cardiac workup in the hospital

## 2020-09-23 NOTE — INCIDENTAL FINDINGS
The following findings require follow up:  Radiographic finding   Finding:  3 4 cm route subscapular hypodense lesion in the right lobe of the liver   Follow up required:  MRI of the abdomen   Follow up should be done within 1 month(s)    Please notify the following clinician to assist with the follow up:   Dr Monty Bauman

## 2020-09-23 NOTE — ASSESSMENT & PLAN NOTE
Lab Results   Component Value Date    HGBA1C 6 9 (H) 02/16/2020     Will put on hold Januvia and Glucophage  Also check blood sugars before meals and before bedtime with insulin sliding scale    Recent Labs     09/22/20  1102 09/22/20  1546 09/22/20 2037 09/23/20  0725   POCGLU 259* 192* 244* 148*       Blood Sugar Average: Last 72 hrs:  (P) 201 1   Continue with the oral hypoglycemic agents hemoglobin A1c is 6 9

## 2021-01-01 ENCOUNTER — APPOINTMENT (OUTPATIENT)
Dept: NON INVASIVE DIAGNOSTICS | Facility: HOSPITAL | Age: 86
DRG: 543 | End: 2021-01-01
Payer: MEDICARE

## 2021-01-01 ENCOUNTER — HOSPITAL ENCOUNTER (OUTPATIENT)
Dept: RADIOLOGY | Age: 86
Discharge: HOME | End: 2021-10-14
Payer: MEDICARE

## 2021-01-01 ENCOUNTER — TELEPHONE (OUTPATIENT)
Dept: HEMATOLOGY ONCOLOGY | Facility: CLINIC | Age: 86
End: 2021-01-01

## 2021-01-01 ENCOUNTER — NURSING HOME VISIT (OUTPATIENT)
Dept: GERIATRICS | Facility: OTHER | Age: 86
End: 2021-01-01
Payer: MEDICARE

## 2021-01-01 ENCOUNTER — APPOINTMENT (INPATIENT)
Dept: RADIOLOGY | Facility: HOSPITAL | Age: 86
DRG: 543 | End: 2021-01-01
Payer: MEDICARE

## 2021-01-01 ENCOUNTER — DOCUMENTATION (OUTPATIENT)
Dept: HEMATOLOGY ONCOLOGY | Facility: CLINIC | Age: 86
End: 2021-01-01

## 2021-01-01 ENCOUNTER — TELEPHONE (OUTPATIENT)
Dept: SURGICAL ONCOLOGY | Facility: CLINIC | Age: 86
End: 2021-01-01

## 2021-01-01 ENCOUNTER — TELEPHONE (OUTPATIENT)
Dept: NEUROSURGERY | Facility: CLINIC | Age: 86
End: 2021-01-01

## 2021-01-01 ENCOUNTER — CONSULT (OUTPATIENT)
Dept: HEMATOLOGY ONCOLOGY | Facility: CLINIC | Age: 86
End: 2021-01-01
Payer: MEDICARE

## 2021-01-01 ENCOUNTER — CONSULT (OUTPATIENT)
Dept: SURGICAL ONCOLOGY | Facility: CLINIC | Age: 86
End: 2021-01-01
Payer: MEDICARE

## 2021-01-01 ENCOUNTER — APPOINTMENT (EMERGENCY)
Dept: RADIOLOGY | Facility: HOSPITAL | Age: 86
DRG: 543 | End: 2021-01-01
Payer: MEDICARE

## 2021-01-01 ENCOUNTER — APPOINTMENT (INPATIENT)
Dept: RADIOLOGY | Facility: HOSPITAL | Age: 86
DRG: 543 | End: 2021-01-01
Attending: STUDENT IN AN ORGANIZED HEALTH CARE EDUCATION/TRAINING PROGRAM
Payer: MEDICARE

## 2021-01-01 ENCOUNTER — TELEPHONE (OUTPATIENT)
Dept: RADIOLOGY | Facility: HOSPITAL | Age: 86
End: 2021-01-01

## 2021-01-01 ENCOUNTER — APPOINTMENT (OUTPATIENT)
Dept: RADIOLOGY | Facility: HOSPITAL | Age: 86
DRG: 543 | End: 2021-01-01
Payer: MEDICARE

## 2021-01-01 ENCOUNTER — HOSPITAL ENCOUNTER (INPATIENT)
Facility: HOSPITAL | Age: 86
LOS: 18 days | Discharge: NURSING, REHAB OR LONG-TERM CARE | DRG: 543 | End: 2021-12-24
Attending: EMERGENCY MEDICINE | Admitting: GENERAL PRACTICE
Payer: MEDICARE

## 2021-01-01 ENCOUNTER — HOSPITAL ENCOUNTER (EMERGENCY)
Facility: HOSPITAL | Age: 86
Discharge: HOME | End: 2021-05-02
Attending: EMERGENCY MEDICINE | Admitting: EMERGENCY MEDICINE
Payer: MEDICARE

## 2021-01-01 VITALS
DIASTOLIC BLOOD PRESSURE: 54 MMHG | WEIGHT: 150.57 LBS | BODY MASS INDEX: 27.71 KG/M2 | RESPIRATION RATE: 18 BRPM | HEART RATE: 75 BPM | HEIGHT: 62 IN | OXYGEN SATURATION: 99 % | SYSTOLIC BLOOD PRESSURE: 106 MMHG | TEMPERATURE: 98.6 F

## 2021-01-01 VITALS
HEIGHT: 60 IN | RESPIRATION RATE: 16 BRPM | BODY MASS INDEX: 29.29 KG/M2 | WEIGHT: 149.2 LBS | OXYGEN SATURATION: 98 % | HEART RATE: 78 BPM | TEMPERATURE: 96.4 F | SYSTOLIC BLOOD PRESSURE: 132 MMHG | DIASTOLIC BLOOD PRESSURE: 72 MMHG

## 2021-01-01 VITALS
OXYGEN SATURATION: 98 % | RESPIRATION RATE: 16 BRPM | HEIGHT: 60 IN | BODY MASS INDEX: 28.86 KG/M2 | WEIGHT: 147 LBS | HEART RATE: 81 BPM | TEMPERATURE: 96.6 F | SYSTOLIC BLOOD PRESSURE: 124 MMHG | DIASTOLIC BLOOD PRESSURE: 70 MMHG

## 2021-01-01 VITALS
HEART RATE: 64 BPM | RESPIRATION RATE: 14 BRPM | DIASTOLIC BLOOD PRESSURE: 67 MMHG | TEMPERATURE: 96.9 F | SYSTOLIC BLOOD PRESSURE: 145 MMHG | OXYGEN SATURATION: 99 %

## 2021-01-01 VITALS
HEART RATE: 84 BPM | OXYGEN SATURATION: 99 % | DIASTOLIC BLOOD PRESSURE: 84 MMHG | SYSTOLIC BLOOD PRESSURE: 142 MMHG | TEMPERATURE: 97.3 F | RESPIRATION RATE: 18 BRPM

## 2021-01-01 DIAGNOSIS — R41.89 COGNITIVE IMPAIRMENT: ICD-10-CM

## 2021-01-01 DIAGNOSIS — N18.4 TYPE 2 DIABETES MELLITUS WITH STAGE 4 CHRONIC KIDNEY DISEASE, WITHOUT LONG-TERM CURRENT USE OF INSULIN (HCC): ICD-10-CM

## 2021-01-01 DIAGNOSIS — C78.7 LIVER METASTASES (HCC): ICD-10-CM

## 2021-01-01 DIAGNOSIS — D47.Z9 OTHER SPECIFIED NEOPLASMS OF UNCERTAIN BEHAVIOR OF LYMPHOID, HEMATOPOIETIC AND RELATED TISSUE (HCC): ICD-10-CM

## 2021-01-01 DIAGNOSIS — E11.22 TYPE 2 DIABETES MELLITUS WITH STAGE 4 CHRONIC KIDNEY DISEASE, WITHOUT LONG-TERM CURRENT USE OF INSULIN (HCC): ICD-10-CM

## 2021-01-01 DIAGNOSIS — R53.81 DEBILITY: ICD-10-CM

## 2021-01-01 DIAGNOSIS — C80.1 CANCER OF UNKNOWN ORIGIN (HCC): Primary | ICD-10-CM

## 2021-01-01 DIAGNOSIS — R55 NEAR SYNCOPE: ICD-10-CM

## 2021-01-01 DIAGNOSIS — C79.51 METASTATIC RENAL CELL CARCINOMA TO BONE (HCC): ICD-10-CM

## 2021-01-01 DIAGNOSIS — Z90.5 HISTORY OF NEPHRECTOMY: ICD-10-CM

## 2021-01-01 DIAGNOSIS — I10 PRIMARY HYPERTENSION: ICD-10-CM

## 2021-01-01 DIAGNOSIS — I95.9 HYPOTENSION: ICD-10-CM

## 2021-01-01 DIAGNOSIS — Z01.89 ENCOUNTER FOR ASSESSMENT OF DECISION-MAKING CAPACITY: ICD-10-CM

## 2021-01-01 DIAGNOSIS — C79.51 METASTATIC RENAL CELL CARCINOMA TO BONE (HCC): Primary | ICD-10-CM

## 2021-01-01 DIAGNOSIS — R26.2 AMBULATORY DYSFUNCTION: ICD-10-CM
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
## 2021-01-01 DIAGNOSIS — C64.9 METASTATIC RENAL CELL CARCINOMA TO BONE (HCC): Primary | ICD-10-CM

## 2021-01-01 DIAGNOSIS — I49.8 SINUS ARRHYTHMIA: ICD-10-CM

## 2021-01-01 DIAGNOSIS — C64.9 METASTATIC RENAL CELL CARCINOMA TO BONE (HCC): ICD-10-CM

## 2021-01-01 DIAGNOSIS — K59.01 SLOW TRANSIT CONSTIPATION: ICD-10-CM

## 2021-01-01 DIAGNOSIS — C80.1 CARCINOMA (HCC): Primary | ICD-10-CM

## 2021-01-01 DIAGNOSIS — M79.641 RIGHT HAND PAIN: ICD-10-CM

## 2021-01-01 DIAGNOSIS — C80.1 CANCER OF UNKNOWN ORIGIN (HCC): ICD-10-CM

## 2021-01-01 DIAGNOSIS — E03.9 HYPOTHYROIDISM, UNSPECIFIED TYPE: ICD-10-CM

## 2021-01-01 DIAGNOSIS — E83.42 HYPOMAGNESEMIA: ICD-10-CM

## 2021-01-01 DIAGNOSIS — C80.1 CARCINOMA (HCC): ICD-10-CM

## 2021-01-01 DIAGNOSIS — S09.90XA INJURY OF HEAD, INITIAL ENCOUNTER: ICD-10-CM

## 2021-01-01 DIAGNOSIS — S41.112A SKIN TEAR OF LEFT UPPER EXTREMITY: Primary | ICD-10-CM

## 2021-01-01 DIAGNOSIS — R55 SYNCOPE AND COLLAPSE: Primary | ICD-10-CM

## 2021-01-01 LAB
ALBUMIN SERPL BCP-MCNC: 2 G/DL (ref 3.5–5)
ALP SERPL-CCNC: 60 U/L (ref 46–116)
ALT SERPL W P-5'-P-CCNC: <6 U/L (ref 12–78)
ANION GAP SERPL CALCULATED.3IONS-SCNC: 10 MMOL/L (ref 4–13)
ANION GAP SERPL CALCULATED.3IONS-SCNC: 5 MMOL/L (ref 4–13)
ANION GAP SERPL CALCULATED.3IONS-SCNC: 6 MMOL/L (ref 4–13)
ANION GAP SERPL CALCULATED.3IONS-SCNC: 7 MMOL/L (ref 4–13)
ANION GAP SERPL CALCULATED.3IONS-SCNC: 7 MMOL/L (ref 4–13)
ANION GAP SERPL CALCULATED.3IONS-SCNC: 9 MMOL/L (ref 4–13)
ANION GAP SERPL CALCULATED.3IONS-SCNC: 9 MMOL/L (ref 4–13)
AORTIC ROOT: 3.1 CM
APICAL FOUR CHAMBER EJECTION FRACTION: 63 %
ASCENDING AORTA: 2.6 CM
AST SERPL W P-5'-P-CCNC: 6 U/L (ref 5–45)
ATRIAL RATE: 133 BPM
ATRIAL RATE: 66 BPM
ATRIAL RATE: 67 BPM
AV REGURGITATION PRESSURE HALF TIME: 0.57 MS
BASOPHILS # BLD AUTO: 0.05 THOUSANDS/ΜL (ref 0–0.1)
BASOPHILS NFR BLD AUTO: 1 % (ref 0–1)
BILIRUB SERPL-MCNC: 0.33 MG/DL (ref 0.2–1)
BUN SERPL-MCNC: 20 MG/DL (ref 5–25)
BUN SERPL-MCNC: 21 MG/DL (ref 5–25)
BUN SERPL-MCNC: 23 MG/DL (ref 5–25)
BUN SERPL-MCNC: 23 MG/DL (ref 5–25)
BUN SERPL-MCNC: 24 MG/DL (ref 5–25)
BUN SERPL-MCNC: 27 MG/DL (ref 5–25)
BUN SERPL-MCNC: 27 MG/DL (ref 5–25)
BUN SERPL-MCNC: 28 MG/DL (ref 5–25)
BUN SERPL-MCNC: 30 MG/DL (ref 5–25)
CA-I BLD-SCNC: 1.14 MMOL/L (ref 1.12–1.32)
CALCIUM ALBUM COR SERPL-MCNC: 7.7 MG/DL (ref 8.3–10.1)
CALCIUM SERPL-MCNC: 10 MG/DL (ref 8.3–10.1)
CALCIUM SERPL-MCNC: 10.1 MG/DL (ref 8.3–10.1)
CALCIUM SERPL-MCNC: 6.1 MG/DL (ref 8.3–10.1)
CALCIUM SERPL-MCNC: 8.5 MG/DL (ref 8.3–10.1)
CALCIUM SERPL-MCNC: 8.7 MG/DL (ref 8.3–10.1)
CALCIUM SERPL-MCNC: 8.9 MG/DL (ref 8.3–10.1)
CALCIUM SERPL-MCNC: 9 MG/DL (ref 8.3–10.1)
CALCIUM SERPL-MCNC: 9 MG/DL (ref 8.3–10.1)
CALCIUM SERPL-MCNC: 9.1 MG/DL (ref 8.3–10.1)
CHLORIDE SERPL-SCNC: 100 MMOL/L (ref 100–108)
CHLORIDE SERPL-SCNC: 102 MMOL/L (ref 100–108)
CHLORIDE SERPL-SCNC: 102 MMOL/L (ref 100–108)
CHLORIDE SERPL-SCNC: 103 MMOL/L (ref 100–108)
CHLORIDE SERPL-SCNC: 105 MMOL/L (ref 100–108)
CHLORIDE SERPL-SCNC: 107 MMOL/L (ref 100–108)
CHLORIDE SERPL-SCNC: 108 MMOL/L (ref 100–108)
CHLORIDE SERPL-SCNC: 109 MMOL/L (ref 100–108)
CHLORIDE SERPL-SCNC: 110 MMOL/L (ref 100–108)
CO2 SERPL-SCNC: 17 MMOL/L (ref 21–32)
CO2 SERPL-SCNC: 17 MMOL/L (ref 21–32)
CO2 SERPL-SCNC: 18 MMOL/L (ref 21–32)
CO2 SERPL-SCNC: 20 MMOL/L (ref 21–32)
CO2 SERPL-SCNC: 21 MMOL/L (ref 21–32)
CO2 SERPL-SCNC: 23 MMOL/L (ref 21–32)
CREAT SERPL-MCNC: 0.91 MG/DL (ref 0.6–1.3)
CREAT SERPL-MCNC: 1.34 MG/DL (ref 0.6–1.3)
CREAT SERPL-MCNC: 1.35 MG/DL (ref 0.6–1.3)
CREAT SERPL-MCNC: 1.39 MG/DL (ref 0.6–1.3)
CREAT SERPL-MCNC: 1.39 MG/DL (ref 0.6–1.3)
CREAT SERPL-MCNC: 1.47 MG/DL (ref 0.6–1.3)
CREAT SERPL-MCNC: 1.64 MG/DL (ref 0.6–1.3)
CREAT SERPL-MCNC: 1.71 MG/DL (ref 0.6–1.3)
CREAT SERPL-MCNC: 1.83 MG/DL (ref 0.6–1.3)
E WAVE DECELERATION TIME: 350 MS
EOSINOPHIL # BLD AUTO: 0.06 THOUSAND/ΜL (ref 0–0.61)
EOSINOPHIL NFR BLD AUTO: 1 % (ref 0–6)
ERYTHROCYTE [DISTWIDTH] IN BLOOD BY AUTOMATED COUNT: 12.7 % (ref 11.6–15.1)
ERYTHROCYTE [DISTWIDTH] IN BLOOD BY AUTOMATED COUNT: 12.7 % (ref 11.6–15.1)
ERYTHROCYTE [DISTWIDTH] IN BLOOD BY AUTOMATED COUNT: 12.8 % (ref 11.6–15.1)
ERYTHROCYTE [DISTWIDTH] IN BLOOD BY AUTOMATED COUNT: 12.9 % (ref 11.6–15.1)
ERYTHROCYTE [DISTWIDTH] IN BLOOD BY AUTOMATED COUNT: 13.1 % (ref 11.6–15.1)
ERYTHROCYTE [DISTWIDTH] IN BLOOD BY AUTOMATED COUNT: 13.2 % (ref 11.6–15.1)
FLUAV RNA RESP QL NAA+PROBE: NEGATIVE
FLUBV RNA RESP QL NAA+PROBE: NEGATIVE
FRACTIONAL SHORTENING: 34 % (ref 28–44)
GFR SERPL CREATININE-BSD FRML MDRD: 24 ML/MIN/1.73SQ M
GFR SERPL CREATININE-BSD FRML MDRD: 26 ML/MIN/1.73SQ M
GFR SERPL CREATININE-BSD FRML MDRD: 28 ML/MIN/1.73SQ M
GFR SERPL CREATININE-BSD FRML MDRD: 31 ML/MIN/1.73SQ M
GFR SERPL CREATININE-BSD FRML MDRD: 34 ML/MIN/1.73SQ M
GFR SERPL CREATININE-BSD FRML MDRD: 34 ML/MIN/1.73SQ M
GFR SERPL CREATININE-BSD FRML MDRD: 35 ML/MIN/1.73SQ M
GFR SERPL CREATININE-BSD FRML MDRD: 35 ML/MIN/1.73SQ M
GFR SERPL CREATININE-BSD FRML MDRD: 56 ML/MIN/1.73SQ M
GLUCOSE SERPL-MCNC: 105 MG/DL (ref 65–140)
GLUCOSE SERPL-MCNC: 107 MG/DL (ref 65–140)
GLUCOSE SERPL-MCNC: 107 MG/DL (ref 65–140)
GLUCOSE SERPL-MCNC: 108 MG/DL (ref 65–140)
GLUCOSE SERPL-MCNC: 110 MG/DL (ref 65–140)
GLUCOSE SERPL-MCNC: 111 MG/DL (ref 65–140)
GLUCOSE SERPL-MCNC: 112 MG/DL (ref 65–140)
GLUCOSE SERPL-MCNC: 112 MG/DL (ref 65–140)
GLUCOSE SERPL-MCNC: 114 MG/DL (ref 65–140)
GLUCOSE SERPL-MCNC: 116 MG/DL (ref 65–140)
GLUCOSE SERPL-MCNC: 116 MG/DL (ref 65–140)
GLUCOSE SERPL-MCNC: 117 MG/DL (ref 65–140)
GLUCOSE SERPL-MCNC: 118 MG/DL (ref 65–140)
GLUCOSE SERPL-MCNC: 119 MG/DL (ref 65–140)
GLUCOSE SERPL-MCNC: 120 MG/DL (ref 65–140)
GLUCOSE SERPL-MCNC: 121 MG/DL (ref 65–140)
GLUCOSE SERPL-MCNC: 122 MG/DL (ref 65–140)
GLUCOSE SERPL-MCNC: 123 MG/DL (ref 65–140)
GLUCOSE SERPL-MCNC: 124 MG/DL (ref 65–140)
GLUCOSE SERPL-MCNC: 125 MG/DL (ref 65–140)
GLUCOSE SERPL-MCNC: 125 MG/DL (ref 65–140)
GLUCOSE SERPL-MCNC: 127 MG/DL (ref 65–140)
GLUCOSE SERPL-MCNC: 128 MG/DL (ref 65–140)
GLUCOSE SERPL-MCNC: 135 MG/DL (ref 65–140)
GLUCOSE SERPL-MCNC: 137 MG/DL (ref 65–140)
GLUCOSE SERPL-MCNC: 139 MG/DL (ref 65–140)
GLUCOSE SERPL-MCNC: 140 MG/DL (ref 65–140)
GLUCOSE SERPL-MCNC: 140 MG/DL (ref 65–140)
GLUCOSE SERPL-MCNC: 143 MG/DL (ref 65–140)
GLUCOSE SERPL-MCNC: 143 MG/DL (ref 65–140)
GLUCOSE SERPL-MCNC: 144 MG/DL (ref 65–140)
GLUCOSE SERPL-MCNC: 147 MG/DL (ref 65–140)
GLUCOSE SERPL-MCNC: 147 MG/DL (ref 65–140)
GLUCOSE SERPL-MCNC: 149 MG/DL (ref 65–140)
GLUCOSE SERPL-MCNC: 152 MG/DL (ref 65–140)
GLUCOSE SERPL-MCNC: 152 MG/DL (ref 65–140)
GLUCOSE SERPL-MCNC: 154 MG/DL (ref 65–140)
GLUCOSE SERPL-MCNC: 157 MG/DL (ref 65–140)
GLUCOSE SERPL-MCNC: 159 MG/DL (ref 65–140)
GLUCOSE SERPL-MCNC: 159 MG/DL (ref 65–140)
GLUCOSE SERPL-MCNC: 160 MG/DL (ref 65–140)
GLUCOSE SERPL-MCNC: 162 MG/DL (ref 65–140)
GLUCOSE SERPL-MCNC: 165 MG/DL (ref 65–140)
GLUCOSE SERPL-MCNC: 166 MG/DL (ref 65–140)
GLUCOSE SERPL-MCNC: 168 MG/DL (ref 65–140)
GLUCOSE SERPL-MCNC: 169 MG/DL (ref 65–140)
GLUCOSE SERPL-MCNC: 172 MG/DL (ref 65–140)
GLUCOSE SERPL-MCNC: 172 MG/DL (ref 65–140)
GLUCOSE SERPL-MCNC: 173 MG/DL (ref 65–140)
GLUCOSE SERPL-MCNC: 173 MG/DL (ref 65–140)
GLUCOSE SERPL-MCNC: 175 MG/DL (ref 65–140)
GLUCOSE SERPL-MCNC: 177 MG/DL (ref 65–140)
GLUCOSE SERPL-MCNC: 178 MG/DL (ref 65–140)
GLUCOSE SERPL-MCNC: 185 MG/DL (ref 65–140)
GLUCOSE SERPL-MCNC: 189 MG/DL (ref 65–140)
GLUCOSE SERPL-MCNC: 191 MG/DL (ref 65–140)
GLUCOSE SERPL-MCNC: 193 MG/DL (ref 65–140)
GLUCOSE SERPL-MCNC: 196 MG/DL (ref 65–140)
GLUCOSE SERPL-MCNC: 197 MG/DL (ref 65–140)
GLUCOSE SERPL-MCNC: 198 MG/DL (ref 65–140)
GLUCOSE SERPL-MCNC: 199 MG/DL (ref 65–140)
GLUCOSE SERPL-MCNC: 203 MG/DL (ref 65–140)
GLUCOSE SERPL-MCNC: 204 MG/DL (ref 65–140)
GLUCOSE SERPL-MCNC: 204 MG/DL (ref 65–140)
GLUCOSE SERPL-MCNC: 207 MG/DL (ref 65–140)
GLUCOSE SERPL-MCNC: 215 MG/DL (ref 65–140)
GLUCOSE SERPL-MCNC: 216 MG/DL (ref 65–140)
GLUCOSE SERPL-MCNC: 217 MG/DL (ref 65–140)
GLUCOSE SERPL-MCNC: 222 MG/DL (ref 65–140)
GLUCOSE SERPL-MCNC: 228 MG/DL (ref 65–140)
GLUCOSE SERPL-MCNC: 229 MG/DL (ref 65–140)
GLUCOSE SERPL-MCNC: 229 MG/DL (ref 65–140)
GLUCOSE SERPL-MCNC: 233 MG/DL (ref 65–140)
GLUCOSE SERPL-MCNC: 235 MG/DL (ref 65–140)
GLUCOSE SERPL-MCNC: 246 MG/DL (ref 65–140)
GLUCOSE SERPL-MCNC: 254 MG/DL (ref 65–140)
GLUCOSE SERPL-MCNC: 260 MG/DL (ref 65–140)
GLUCOSE SERPL-MCNC: 271 MG/DL (ref 65–140)
GLUCOSE SERPL-MCNC: 284 MG/DL (ref 65–140)
GLUCOSE SERPL-MCNC: 98 MG/DL (ref 65–140)
HCT VFR BLD AUTO: 31.1 % (ref 34.8–46.1)
HCT VFR BLD AUTO: 31.6 % (ref 34.8–46.1)
HCT VFR BLD AUTO: 33.7 % (ref 34.8–46.1)
HCT VFR BLD AUTO: 34.1 % (ref 34.8–46.1)
HCT VFR BLD AUTO: 35.2 % (ref 34.8–46.1)
HCT VFR BLD AUTO: 36.3 % (ref 34.8–46.1)
HGB BLD-MCNC: 10.6 G/DL (ref 11.5–15.4)
HGB BLD-MCNC: 11 G/DL (ref 11.5–15.4)
HGB BLD-MCNC: 11.1 G/DL (ref 11.5–15.4)
HGB BLD-MCNC: 11.3 G/DL (ref 11.5–15.4)
HGB BLD-MCNC: 9.9 G/DL (ref 11.5–15.4)
HGB BLD-MCNC: 9.9 G/DL (ref 11.5–15.4)
IMM GRANULOCYTES # BLD AUTO: 0.03 THOUSAND/UL (ref 0–0.2)
IMM GRANULOCYTES NFR BLD AUTO: 0 % (ref 0–2)
INTERVENTRICULAR SEPTUM IN DIASTOLE (PARASTERNAL SHORT AXIS VIEW): 0.9 CM
LACTATE SERPL-SCNC: 1.3 MMOL/L (ref 0.5–2)
LEFT ATRIUM AREA SYSTOLE SINGLE PLANE A4C: 19.5 CM2
LEFT INTERNAL DIMENSION IN SYSTOLE: 3.1 CM (ref 2.1–4)
LEFT VENTRICULAR INTERNAL DIMENSION IN DIASTOLE: 4.7 CM (ref 3.93–5.85)
LEFT VENTRICULAR POSTERIOR WALL IN END DIASTOLE: 0.7 CM
LEFT VENTRICULAR STROKE VOLUME: 65 ML
LIPASE SERPL-CCNC: 35 U/L (ref 73–393)
LYMPHOCYTES # BLD AUTO: 1.23 THOUSANDS/ΜL (ref 0.6–4.47)
LYMPHOCYTES NFR BLD AUTO: 14 % (ref 14–44)
MAGNESIUM SERPL-MCNC: 1.7 MG/DL (ref 1.6–2.6)
MAGNESIUM SERPL-MCNC: 2.3 MG/DL (ref 1.6–2.6)
MCH RBC QN AUTO: 30.4 PG (ref 26.8–34.3)
MCH RBC QN AUTO: 30.4 PG (ref 26.8–34.3)
MCH RBC QN AUTO: 30.5 PG (ref 26.8–34.3)
MCH RBC QN AUTO: 30.7 PG (ref 26.8–34.3)
MCH RBC QN AUTO: 30.7 PG (ref 26.8–34.3)
MCH RBC QN AUTO: 31.8 PG (ref 26.8–34.3)
MCHC RBC AUTO-ENTMCNC: 31.1 G/DL (ref 31.4–37.4)
MCHC RBC AUTO-ENTMCNC: 31.3 G/DL (ref 31.4–37.4)
MCHC RBC AUTO-ENTMCNC: 31.5 G/DL (ref 31.4–37.4)
MCHC RBC AUTO-ENTMCNC: 31.5 G/DL (ref 31.4–37.4)
MCHC RBC AUTO-ENTMCNC: 31.8 G/DL (ref 31.4–37.4)
MCHC RBC AUTO-ENTMCNC: 32.3 G/DL (ref 31.4–37.4)
MCV RBC AUTO: 96 FL (ref 82–98)
MCV RBC AUTO: 97 FL (ref 82–98)
MCV RBC AUTO: 97 FL (ref 82–98)
MCV RBC AUTO: 98 FL (ref 82–98)
MCV RBC AUTO: 98 FL (ref 82–98)
MCV RBC AUTO: 99 FL (ref 82–98)
MONOCYTES # BLD AUTO: 1.03 THOUSAND/ΜL (ref 0.17–1.22)
MONOCYTES NFR BLD AUTO: 12 % (ref 4–12)
MV E'TISSUE VEL-SEP: 3 CM/S
MV PEAK A VEL: 1.1 M/S
MV PEAK E VEL: 58 CM/S
MV STENOSIS PRESSURE HALF TIME: 0 MS
NEUTROPHILS # BLD AUTO: 6.22 THOUSANDS/ΜL (ref 1.85–7.62)
NEUTS SEG NFR BLD AUTO: 72 % (ref 43–75)
NRBC BLD AUTO-RTO: 0 /100 WBCS
OSMOLALITY UR/SERPL-RTO: 293 MMOL/KG (ref 282–298)
P AXIS: 36 DEGREES
P AXIS: 64 DEGREES
PHOSPHATE SERPL-MCNC: 2.5 MG/DL (ref 2.3–4.1)
PLATELET # BLD AUTO: 203 THOUSANDS/UL (ref 149–390)
PLATELET # BLD AUTO: 241 THOUSANDS/UL (ref 149–390)
PLATELET # BLD AUTO: 242 THOUSANDS/UL (ref 149–390)
PLATELET # BLD AUTO: 258 THOUSANDS/UL (ref 149–390)
PLATELET # BLD AUTO: 310 THOUSANDS/UL (ref 149–390)
PLATELET # BLD AUTO: 345 THOUSANDS/UL (ref 149–390)
PMV BLD AUTO: 10.1 FL (ref 8.9–12.7)
PMV BLD AUTO: 10.2 FL (ref 8.9–12.7)
PMV BLD AUTO: 10.2 FL (ref 8.9–12.7)
PMV BLD AUTO: 10.5 FL (ref 8.9–12.7)
PMV BLD AUTO: 10.6 FL (ref 8.9–12.7)
PMV BLD AUTO: 10.6 FL (ref 8.9–12.7)
POTASSIUM SERPL-SCNC: 3.2 MMOL/L (ref 3.5–5.3)
POTASSIUM SERPL-SCNC: 4.7 MMOL/L (ref 3.5–5.3)
POTASSIUM SERPL-SCNC: 4.7 MMOL/L (ref 3.5–5.3)
POTASSIUM SERPL-SCNC: 4.9 MMOL/L (ref 3.5–5.3)
POTASSIUM SERPL-SCNC: 5 MMOL/L (ref 3.5–5.3)
POTASSIUM SERPL-SCNC: 5.4 MMOL/L (ref 3.5–5.3)
POTASSIUM SERPL-SCNC: 6.1 MMOL/L (ref 3.5–5.3)
PR INTERVAL: 178 MS
PR INTERVAL: 180 MS
PROT SERPL-MCNC: 4.4 G/DL (ref 6.4–8.2)
QRS AXIS: -11 DEGREES
QRS AXIS: -17 DEGREES
QRS AXIS: -42 DEGREES
QRSD INTERVAL: 136 MS
QRSD INTERVAL: 140 MS
QRSD INTERVAL: 142 MS
QT INTERVAL: 470 MS
QT INTERVAL: 480 MS
QT INTERVAL: 530 MS
QTC INTERVAL: 492 MS
QTC INTERVAL: 507 MS
QTC INTERVAL: 520 MS
RBC # BLD AUTO: 3.23 MILLION/UL (ref 3.81–5.12)
RBC # BLD AUTO: 3.26 MILLION/UL (ref 3.81–5.12)
RBC # BLD AUTO: 3.46 MILLION/UL (ref 3.81–5.12)
RBC # BLD AUTO: 3.48 MILLION/UL (ref 3.81–5.12)
RBC # BLD AUTO: 3.61 MILLION/UL (ref 3.81–5.12)
RBC # BLD AUTO: 3.72 MILLION/UL (ref 3.81–5.12)
RIGHT ATRIUM AREA SYSTOLE A4C: 18.3 CM2
RIGHT VENTRICLE ID DIMENSION: 4 CM
RSV RNA RESP QL NAA+PROBE: NEGATIVE
SARS-COV-2 RNA RESP QL NAA+PROBE: NEGATIVE
SL CV AV DECELERATION TIME RETROGRADE: 1984 MS
SL CV AV PEAK GRADIENT RETROGRADE: 99 MMHG
SL CV LV EF: 58
SL CV PED ECHO LEFT VENTRICLE DIASTOLIC VOLUME (MOD BIPLANE) 2D: 103 ML
SL CV PED ECHO LEFT VENTRICLE SYSTOLIC VOLUME (MOD BIPLANE) 2D: 38 ML
SODIUM SERPL-SCNC: 127 MMOL/L (ref 136–145)
SODIUM SERPL-SCNC: 128 MMOL/L (ref 136–145)
SODIUM SERPL-SCNC: 131 MMOL/L (ref 136–145)
SODIUM SERPL-SCNC: 132 MMOL/L (ref 136–145)
SODIUM SERPL-SCNC: 134 MMOL/L (ref 136–145)
SODIUM SERPL-SCNC: 135 MMOL/L (ref 136–145)
SODIUM SERPL-SCNC: 135 MMOL/L (ref 136–145)
T WAVE AXIS: 114 DEGREES
T WAVE AXIS: 128 DEGREES
T WAVE AXIS: 138 DEGREES
TR MAX PG: 23 MMHG
TRICUSPID VALVE PEAK REGURGITATION VELOCITY: 2.41 M/S
TRICUSPID VALVE S': 0.6 CM/S
TV PEAK GRADIENT: 23 MMHG
VENTRICULAR RATE: 58 BPM
VENTRICULAR RATE: 66 BPM
VENTRICULAR RATE: 67 BPM
WBC # BLD AUTO: 7.53 THOUSAND/UL (ref 4.31–10.16)
WBC # BLD AUTO: 8.05 THOUSAND/UL (ref 4.31–10.16)
WBC # BLD AUTO: 8.62 THOUSAND/UL (ref 4.31–10.16)
WBC # BLD AUTO: 8.77 THOUSAND/UL (ref 4.31–10.16)
WBC # BLD AUTO: 9.3 THOUSAND/UL (ref 4.31–10.16)
WBC # BLD AUTO: 9.78 THOUSAND/UL (ref 4.31–10.16)
Z-SCORE OF LEFT VENTRICULAR DIMENSION IN END SYSTOLE: -0.2

## 2021-01-01 PROCEDURE — 97110 THERAPEUTIC EXERCISES: CPT

## 2021-01-01 PROCEDURE — 73080 X-RAY EXAM OF ELBOW: CPT

## 2021-01-01 PROCEDURE — 93306 TTE W/DOPPLER COMPLETE: CPT | Performed by: INTERNAL MEDICINE

## 2021-01-01 PROCEDURE — 82948 REAGENT STRIP/BLOOD GLUCOSE: CPT

## 2021-01-01 PROCEDURE — 77387 GUIDANCE FOR RADJ TX DLVR: CPT | Performed by: STUDENT IN AN ORGANIZED HEALTH CARE EDUCATION/TRAINING PROGRAM

## 2021-01-01 PROCEDURE — DP0C1ZZ BEAM RADIATION OF OTHER BONE USING PHOTONS 1 - 10 MEV: ICD-10-PCS | Performed by: INTERNAL MEDICINE

## 2021-01-01 PROCEDURE — 80048 BASIC METABOLIC PNL TOTAL CA: CPT | Performed by: INTERNAL MEDICINE

## 2021-01-01 PROCEDURE — 99282 EMERGENCY DEPT VISIT SF MDM: CPT | Performed by: EMERGENCY MEDICINE

## 2021-01-01 PROCEDURE — 99232 SBSQ HOSP IP/OBS MODERATE 35: CPT | Performed by: GENERAL PRACTICE

## 2021-01-01 PROCEDURE — 99232 SBSQ HOSP IP/OBS MODERATE 35: CPT | Performed by: SURGERY

## 2021-01-01 PROCEDURE — 99232 SBSQ HOSP IP/OBS MODERATE 35: CPT | Performed by: INTERNAL MEDICINE

## 2021-01-01 PROCEDURE — 77295 3-D RADIOTHERAPY PLAN: CPT | Performed by: STUDENT IN AN ORGANIZED HEALTH CARE EDUCATION/TRAINING PROGRAM

## 2021-01-01 PROCEDURE — 77280 THER RAD SIMULAJ FIELD SMPL: CPT | Performed by: STUDENT IN AN ORGANIZED HEALTH CARE EDUCATION/TRAINING PROGRAM

## 2021-01-01 PROCEDURE — 99204 OFFICE O/P NEW MOD 45 MIN: CPT | Performed by: INTERNAL MEDICINE

## 2021-01-01 PROCEDURE — 77334 RADIATION TREATMENT AID(S): CPT | Performed by: STUDENT IN AN ORGANIZED HEALTH CARE EDUCATION/TRAINING PROGRAM

## 2021-01-01 PROCEDURE — 92610 EVALUATE SWALLOWING FUNCTION: CPT

## 2021-01-01 PROCEDURE — 99223 1ST HOSP IP/OBS HIGH 75: CPT | Performed by: STUDENT IN AN ORGANIZED HEALTH CARE EDUCATION/TRAINING PROGRAM

## 2021-01-01 PROCEDURE — 99232 SBSQ HOSP IP/OBS MODERATE 35: CPT | Performed by: PHYSICIAN ASSISTANT

## 2021-01-01 PROCEDURE — 77387 GUIDANCE FOR RADJ TX DLVR: CPT | Performed by: RADIOLOGY

## 2021-01-01 PROCEDURE — 77412 RADIATION TX DELIVERY LVL 3: CPT | Performed by: RADIOLOGY

## 2021-01-01 PROCEDURE — 97530 THERAPEUTIC ACTIVITIES: CPT

## 2021-01-01 PROCEDURE — 77387 GUIDANCE FOR RADJ TX DLVR: CPT | Performed by: INTERNAL MEDICINE

## 2021-01-01 PROCEDURE — 80048 BASIC METABOLIC PNL TOTAL CA: CPT | Performed by: PHYSICIAN ASSISTANT

## 2021-01-01 PROCEDURE — 97116 GAIT TRAINING THERAPY: CPT

## 2021-01-01 PROCEDURE — 83735 ASSAY OF MAGNESIUM: CPT | Performed by: GENERAL PRACTICE

## 2021-01-01 PROCEDURE — 99222 1ST HOSP IP/OBS MODERATE 55: CPT | Performed by: ORTHOPAEDIC SURGERY

## 2021-01-01 PROCEDURE — 72148 MRI LUMBAR SPINE W/O DYE: CPT

## 2021-01-01 PROCEDURE — 36415 COLL VENOUS BLD VENIPUNCTURE: CPT | Performed by: EMERGENCY MEDICINE

## 2021-01-01 PROCEDURE — 77412 RADIATION TX DELIVERY LVL 3: CPT | Performed by: INTERNAL MEDICINE

## 2021-01-01 PROCEDURE — 99239 HOSP IP/OBS DSCHRG MGMT >30: CPT | Performed by: INTERNAL MEDICINE

## 2021-01-01 PROCEDURE — 99309 SBSQ NF CARE MODERATE MDM 30: CPT | Performed by: NURSE PRACTITIONER

## 2021-01-01 PROCEDURE — 99232 SBSQ HOSP IP/OBS MODERATE 35: CPT | Performed by: NURSE PRACTITIONER

## 2021-01-01 PROCEDURE — G6002 STEREOSCOPIC X-RAY GUIDANCE: HCPCS | Performed by: RADIOLOGY

## 2021-01-01 PROCEDURE — 80053 COMPREHEN METABOLIC PANEL: CPT | Performed by: EMERGENCY MEDICINE

## 2021-01-01 PROCEDURE — G6002 STEREOSCOPIC X-RAY GUIDANCE: HCPCS | Performed by: INTERNAL MEDICINE

## 2021-01-01 PROCEDURE — 93005 ELECTROCARDIOGRAM TRACING: CPT

## 2021-01-01 PROCEDURE — 77300 RADIATION THERAPY DOSE PLAN: CPT | Performed by: STUDENT IN AN ORGANIZED HEALTH CARE EDUCATION/TRAINING PROGRAM

## 2021-01-01 PROCEDURE — 97535 SELF CARE MNGMENT TRAINING: CPT

## 2021-01-01 PROCEDURE — 97167 OT EVAL HIGH COMPLEX 60 MIN: CPT

## 2021-01-01 PROCEDURE — 77412 RADIATION TX DELIVERY LVL 3: CPT | Performed by: STUDENT IN AN ORGANIZED HEALTH CARE EDUCATION/TRAINING PROGRAM

## 2021-01-01 PROCEDURE — 99205 OFFICE O/P NEW HI 60 MIN: CPT | Performed by: STUDENT IN AN ORGANIZED HEALTH CARE EDUCATION/TRAINING PROGRAM

## 2021-01-01 PROCEDURE — 99285 EMERGENCY DEPT VISIT HI MDM: CPT | Performed by: EMERGENCY MEDICINE

## 2021-01-01 PROCEDURE — 99285 EMERGENCY DEPT VISIT HI MDM: CPT

## 2021-01-01 PROCEDURE — 78815 PET IMAGE W/CT SKULL-THIGH: CPT

## 2021-01-01 PROCEDURE — 99223 1ST HOSP IP/OBS HIGH 75: CPT | Performed by: FAMILY MEDICINE

## 2021-01-01 PROCEDURE — 83930 ASSAY OF BLOOD OSMOLALITY: CPT | Performed by: GENERAL PRACTICE

## 2021-01-01 PROCEDURE — 99222 1ST HOSP IP/OBS MODERATE 55: CPT | Performed by: SURGERY

## 2021-01-01 PROCEDURE — 99305 1ST NF CARE MODERATE MDM 35: CPT | Performed by: FAMILY MEDICINE

## 2021-01-01 PROCEDURE — 77336 RADIATION PHYSICS CONSULT: CPT | Performed by: STUDENT IN AN ORGANIZED HEALTH CARE EDUCATION/TRAINING PROGRAM

## 2021-01-01 PROCEDURE — 77280 THER RAD SIMULAJ FIELD SMPL: CPT | Performed by: INTERNAL MEDICINE

## 2021-01-01 PROCEDURE — 73564 X-RAY EXAM KNEE 4 OR MORE: CPT

## 2021-01-01 PROCEDURE — 99282 EMERGENCY DEPT VISIT SF MDM: CPT

## 2021-01-01 PROCEDURE — A9552 F18 FDG: HCPCS

## 2021-01-01 PROCEDURE — 77427 RADIATION TX MANAGEMENT X5: CPT | Performed by: STUDENT IN AN ORGANIZED HEALTH CARE EDUCATION/TRAINING PROGRAM

## 2021-01-01 PROCEDURE — 73110 X-RAY EXAM OF WRIST: CPT

## 2021-01-01 PROCEDURE — 84100 ASSAY OF PHOSPHORUS: CPT | Performed by: GENERAL PRACTICE

## 2021-01-01 PROCEDURE — 80048 BASIC METABOLIC PNL TOTAL CA: CPT | Performed by: GENERAL PRACTICE

## 2021-01-01 PROCEDURE — 82330 ASSAY OF CALCIUM: CPT | Performed by: GENERAL PRACTICE

## 2021-01-01 PROCEDURE — NC001 PR NO CHARGE: Performed by: NEUROLOGICAL SURGERY

## 2021-01-01 PROCEDURE — 93306 TTE W/DOPPLER COMPLETE: CPT

## 2021-01-01 PROCEDURE — 97163 PT EVAL HIGH COMPLEX 45 MIN: CPT

## 2021-01-01 PROCEDURE — 77336 RADIATION PHYSICS CONSULT: CPT | Performed by: RADIOLOGY

## 2021-01-01 PROCEDURE — 77427 RADIATION TX MANAGEMENT X5: CPT | Performed by: RADIOLOGY

## 2021-01-01 PROCEDURE — 70450 CT HEAD/BRAIN W/O DYE: CPT

## 2021-01-01 PROCEDURE — 85025 COMPLETE CBC W/AUTO DIFF WBC: CPT | Performed by: EMERGENCY MEDICINE

## 2021-01-01 PROCEDURE — 93010 ELECTROCARDIOGRAM REPORT: CPT | Performed by: INTERNAL MEDICINE

## 2021-01-01 PROCEDURE — G6002 STEREOSCOPIC X-RAY GUIDANCE: HCPCS | Performed by: STUDENT IN AN ORGANIZED HEALTH CARE EDUCATION/TRAINING PROGRAM

## 2021-01-01 PROCEDURE — 83605 ASSAY OF LACTIC ACID: CPT | Performed by: EMERGENCY MEDICINE

## 2021-01-01 PROCEDURE — 77263 THER RADIOLOGY TX PLNG CPLX: CPT | Performed by: STUDENT IN AN ORGANIZED HEALTH CARE EDUCATION/TRAINING PROGRAM

## 2021-01-01 PROCEDURE — 85027 COMPLETE CBC AUTOMATED: CPT | Performed by: PHYSICIAN ASSISTANT

## 2021-01-01 PROCEDURE — 92526 ORAL FUNCTION THERAPY: CPT

## 2021-01-01 PROCEDURE — 77014 HB CT SCAN FOR THERAPY GUIDE: CPT

## 2021-01-01 PROCEDURE — 85027 COMPLETE CBC AUTOMATED: CPT | Performed by: GENERAL PRACTICE

## 2021-01-01 PROCEDURE — 71260 CT THORAX DX C+: CPT

## 2021-01-01 PROCEDURE — 0241U HB NFCT DS VIR RESP RNA 4 TRGT: CPT | Performed by: INTERNAL MEDICINE

## 2021-01-01 PROCEDURE — 36415 COLL VENOUS BLD VENIPUNCTURE: CPT | Performed by: GENERAL PRACTICE

## 2021-01-01 PROCEDURE — 83690 ASSAY OF LIPASE: CPT | Performed by: EMERGENCY MEDICINE

## 2021-01-01 PROCEDURE — 80048 BASIC METABOLIC PNL TOTAL CA: CPT | Performed by: STUDENT IN AN ORGANIZED HEALTH CARE EDUCATION/TRAINING PROGRAM

## 2021-01-01 PROCEDURE — G1004 CDSM NDSC: HCPCS

## 2021-01-01 PROCEDURE — 72125 CT NECK SPINE W/O DYE: CPT

## 2021-01-01 PROCEDURE — 85027 COMPLETE CBC AUTOMATED: CPT | Performed by: STUDENT IN AN ORGANIZED HEALTH CARE EDUCATION/TRAINING PROGRAM

## 2021-01-01 PROCEDURE — 74177 CT ABD & PELVIS W/CONTRAST: CPT

## 2021-01-01 PROCEDURE — 99222 1ST HOSP IP/OBS MODERATE 55: CPT | Performed by: NURSE PRACTITIONER

## 2021-01-01 PROCEDURE — 99220 PR INITIAL OBSERVATION CARE/DAY 70 MINUTES: CPT | Performed by: GENERAL PRACTICE

## 2021-01-01 RX ORDER — OXYCODONE HYDROCHLORIDE 5 MG/1
5 TABLET ORAL
Status: DISCONTINUED | OUTPATIENT
Start: 2021-01-01 | End: 2021-01-01

## 2021-01-01 RX ORDER — POLYETHYLENE GLYCOL 3350 17 G/17G
17 POWDER, FOR SOLUTION ORAL DAILY
COMMUNITY
End: 2022-01-01

## 2021-01-01 RX ORDER — ACETAMINOPHEN 325 MG/1
650 TABLET ORAL EVERY 6 HOURS PRN
Status: DISCONTINUED | OUTPATIENT
Start: 2021-01-01 | End: 2021-01-01

## 2021-01-01 RX ORDER — POLYETHYLENE GLYCOL 3350 17 G/17G
17 POWDER, FOR SOLUTION ORAL DAILY PRN
Refills: 0
Start: 2021-01-01 | End: 2022-01-01 | Stop reason: SDUPTHER

## 2021-01-01 RX ORDER — LIDOCAINE 50 MG/G
1 PATCH TOPICAL DAILY
Status: DISCONTINUED | OUTPATIENT
Start: 2021-01-01 | End: 2021-01-01

## 2021-01-01 RX ORDER — HYDRALAZINE HYDROCHLORIDE 20 MG/ML
5 INJECTION INTRAMUSCULAR; INTRAVENOUS EVERY 6 HOURS PRN
Status: DISCONTINUED | OUTPATIENT
Start: 2021-01-01 | End: 2021-01-01 | Stop reason: HOSPADM

## 2021-01-01 RX ORDER — OXYCODONE HYDROCHLORIDE 5 MG/1
2.5 TABLET ORAL EVERY 4 HOURS PRN
Status: DISCONTINUED | OUTPATIENT
Start: 2021-01-01 | End: 2021-01-01

## 2021-01-01 RX ORDER — PRAVASTATIN SODIUM 20 MG
80 TABLET ORAL
Status: DISCONTINUED | OUTPATIENT
Start: 2021-01-01 | End: 2021-01-01 | Stop reason: HOSPADM

## 2021-01-01 RX ORDER — OXYCODONE HYDROCHLORIDE 5 MG/1
2.5 TABLET ORAL
Status: DISCONTINUED | OUTPATIENT
Start: 2021-01-01 | End: 2021-01-01 | Stop reason: HOSPADM

## 2021-01-01 RX ORDER — CALCIUM CARBONATE 200(500)MG
500 TABLET,CHEWABLE ORAL DAILY PRN
Status: DISCONTINUED | OUTPATIENT
Start: 2021-01-01 | End: 2021-01-01 | Stop reason: HOSPADM

## 2021-01-01 RX ORDER — OXYCODONE HYDROCHLORIDE 5 MG/1
2.5 TABLET ORAL ONCE
Status: COMPLETED | OUTPATIENT
Start: 2021-01-01 | End: 2021-01-01

## 2021-01-01 RX ORDER — SENNOSIDES 8.6 MG
2 TABLET ORAL
Status: DISCONTINUED | OUTPATIENT
Start: 2021-01-01 | End: 2021-01-01 | Stop reason: HOSPADM

## 2021-01-01 RX ORDER — PANTOPRAZOLE SODIUM 40 MG/1
40 TABLET, DELAYED RELEASE ORAL
Status: DISCONTINUED | OUTPATIENT
Start: 2021-01-01 | End: 2021-01-01 | Stop reason: HOSPADM

## 2021-01-01 RX ORDER — SODIUM CHLORIDE 9 MG/ML
75 INJECTION, SOLUTION INTRAVENOUS CONTINUOUS
Status: DISPENSED | OUTPATIENT
Start: 2021-01-01 | End: 2021-01-01

## 2021-01-01 RX ORDER — OXYCODONE HYDROCHLORIDE 5 MG/1
2.5 TABLET ORAL
Status: DISCONTINUED | OUTPATIENT
Start: 2021-01-01 | End: 2021-01-01

## 2021-01-01 RX ORDER — POLYETHYLENE GLYCOL 3350 17 G/17G
17 POWDER, FOR SOLUTION ORAL DAILY
Status: DISCONTINUED | OUTPATIENT
Start: 2021-01-01 | End: 2021-01-01

## 2021-01-01 RX ORDER — FUROSEMIDE 20 MG/1
20 TABLET ORAL DAILY
Status: DISCONTINUED | OUTPATIENT
Start: 2021-01-01 | End: 2021-01-01 | Stop reason: HOSPADM

## 2021-01-01 RX ORDER — ACETAMINOPHEN 325 MG/1
650 TABLET ORAL
Status: DISCONTINUED | OUTPATIENT
Start: 2021-01-01 | End: 2021-01-01 | Stop reason: HOSPADM

## 2021-01-01 RX ORDER — OXYCODONE HYDROCHLORIDE 5 MG/1
2.5 TABLET ORAL EVERY 4 HOURS PRN
Qty: 30 TABLET | Refills: 0 | Status: SHIPPED | OUTPATIENT
Start: 2021-01-01 | End: 2022-01-01

## 2021-01-01 RX ORDER — SENNOSIDES 8.6 MG
17.2 TABLET ORAL
Refills: 0
Start: 2021-01-01 | End: 2022-01-01 | Stop reason: SDUPTHER

## 2021-01-01 RX ORDER — ACETAMINOPHEN 325 MG/1
650 TABLET ORAL ONCE
Status: COMPLETED | OUTPATIENT
Start: 2021-01-01 | End: 2021-01-01

## 2021-01-01 RX ORDER — DOCUSATE SODIUM 100 MG/1
100 CAPSULE, LIQUID FILLED ORAL 2 TIMES DAILY
Status: DISCONTINUED | OUTPATIENT
Start: 2021-01-01 | End: 2021-01-01

## 2021-01-01 RX ORDER — POLYETHYLENE GLYCOL 3350 17 G/17G
17 POWDER, FOR SOLUTION ORAL DAILY PRN
Status: DISCONTINUED | OUTPATIENT
Start: 2021-01-01 | End: 2021-01-01 | Stop reason: HOSPADM

## 2021-01-01 RX ORDER — LANOLIN ALCOHOL/MO/W.PET/CERES
3 CREAM (GRAM) TOPICAL
Refills: 0
Start: 2021-01-01 | End: 2022-01-01 | Stop reason: SDUPTHER

## 2021-01-01 RX ORDER — ASPIRIN 81 MG/1
81 TABLET ORAL DAILY
Status: DISCONTINUED | OUTPATIENT
Start: 2021-01-01 | End: 2021-01-01 | Stop reason: HOSPADM

## 2021-01-01 RX ORDER — LANOLIN ALCOHOL/MO/W.PET/CERES
3 CREAM (GRAM) TOPICAL
Status: DISCONTINUED | OUTPATIENT
Start: 2021-01-01 | End: 2021-01-01 | Stop reason: HOSPADM

## 2021-01-01 RX ORDER — OXYCODONE HYDROCHLORIDE 5 MG/1
5 TABLET ORAL
Status: DISCONTINUED | OUTPATIENT
Start: 2021-01-01 | End: 2021-01-01 | Stop reason: HOSPADM

## 2021-01-01 RX ORDER — CALCIUM CARBONATE 200(500)MG
500 TABLET,CHEWABLE ORAL DAILY PRN
Refills: 0
Start: 2021-01-01 | End: 2022-01-01 | Stop reason: SDUPTHER

## 2021-01-01 RX ORDER — OXYCODONE HYDROCHLORIDE 5 MG/1
5 TABLET ORAL EVERY 4 HOURS PRN
Status: DISCONTINUED | OUTPATIENT
Start: 2021-01-01 | End: 2021-01-01

## 2021-01-01 RX ORDER — LIDOCAINE 50 MG/G
2 PATCH TOPICAL DAILY
Status: DISCONTINUED | OUTPATIENT
Start: 2021-01-01 | End: 2021-01-01 | Stop reason: HOSPADM

## 2021-01-01 RX ORDER — SODIUM CHLORIDE 9 MG/ML
75 INJECTION, SOLUTION INTRAVENOUS CONTINUOUS
Status: DISCONTINUED | OUTPATIENT
Start: 2021-01-01 | End: 2021-01-01

## 2021-01-01 RX ORDER — LIDOCAINE HYDROCHLORIDE 20 MG/ML
1 JELLY TOPICAL ONCE
Status: COMPLETED | OUTPATIENT
Start: 2021-01-01 | End: 2021-01-01

## 2021-01-01 RX ORDER — MAGNESIUM SULFATE HEPTAHYDRATE 40 MG/ML
2 INJECTION, SOLUTION INTRAVENOUS ONCE
Status: COMPLETED | OUTPATIENT
Start: 2021-01-01 | End: 2021-01-01

## 2021-01-01 RX ORDER — POTASSIUM CHLORIDE 20 MEQ/1
40 TABLET, EXTENDED RELEASE ORAL ONCE
Status: COMPLETED | OUTPATIENT
Start: 2021-01-01 | End: 2021-01-01

## 2021-01-01 RX ORDER — BISACODYL 10 MG
10 SUPPOSITORY, RECTAL RECTAL ONCE
Status: DISCONTINUED | OUTPATIENT
Start: 2021-01-01 | End: 2021-01-01

## 2021-01-01 RX ORDER — LEVOTHYROXINE SODIUM 0.07 MG/1
75 TABLET ORAL DAILY
Status: DISCONTINUED | OUTPATIENT
Start: 2021-01-01 | End: 2021-01-01 | Stop reason: HOSPADM

## 2021-01-01 RX ORDER — ACETAMINOPHEN 325 MG/1
975 TABLET ORAL EVERY 8 HOURS SCHEDULED
Status: DISCONTINUED | OUTPATIENT
Start: 2021-01-01 | End: 2021-01-01

## 2021-01-01 RX ORDER — BACITRACIN, NEOMYCIN, POLYMYXIN B 400; 3.5; 5 [USP'U]/G; MG/G; [USP'U]/G
1 OINTMENT TOPICAL ONCE
Status: COMPLETED | OUTPATIENT
Start: 2021-01-01 | End: 2021-01-01

## 2021-01-01 RX ORDER — LIDOCAINE 50 MG/G
2 PATCH TOPICAL DAILY
Refills: 0
Start: 2021-01-01 | End: 2022-01-01 | Stop reason: SDUPTHER

## 2021-01-01 RX ORDER — OXYCODONE HYDROCHLORIDE 5 MG/1
2.5 TABLET ORAL EVERY 4 HOURS PRN
Qty: 30 TABLET | Refills: 0 | Status: SHIPPED | OUTPATIENT
Start: 2021-01-01 | End: 2021-01-01

## 2021-01-01 RX ADMIN — FUROSEMIDE 20 MG: 20 TABLET ORAL at 08:47

## 2021-01-01 RX ADMIN — ACETAMINOPHEN 650 MG: 325 TABLET, FILM COATED ORAL at 08:25

## 2021-01-01 RX ADMIN — CYANOCOBALAMIN TAB 500 MCG 1000 MCG: 500 TAB at 08:34

## 2021-01-01 RX ADMIN — OXYCODONE HYDROCHLORIDE 2.5 MG: 5 TABLET ORAL at 11:56

## 2021-01-01 RX ADMIN — PANTOPRAZOLE SODIUM 40 MG: 40 TABLET, DELAYED RELEASE ORAL at 05:11

## 2021-01-01 RX ADMIN — PANTOPRAZOLE SODIUM 40 MG: 40 TABLET, DELAYED RELEASE ORAL at 04:58

## 2021-01-01 RX ADMIN — LIDOCAINE 5% 2 PATCH: 700 PATCH TOPICAL at 09:51

## 2021-01-01 RX ADMIN — ACETAMINOPHEN 975 MG: 325 TABLET, FILM COATED ORAL at 13:45

## 2021-01-01 RX ADMIN — LIDOCAINE 5% 2 PATCH: 700 PATCH TOPICAL at 09:04

## 2021-01-01 RX ADMIN — OXYCODONE HYDROCHLORIDE 2.5 MG: 5 TABLET ORAL at 09:50

## 2021-01-01 RX ADMIN — LEVOTHYROXINE SODIUM 75 MCG: 75 TABLET ORAL at 04:58

## 2021-01-01 RX ADMIN — INSULIN LISPRO 3 UNITS: 100 INJECTION, SOLUTION INTRAVENOUS; SUBCUTANEOUS at 11:59

## 2021-01-01 RX ADMIN — PANTOPRAZOLE SODIUM 40 MG: 40 TABLET, DELAYED RELEASE ORAL at 06:18

## 2021-01-01 RX ADMIN — PRAVASTATIN SODIUM 80 MG: 20 TABLET ORAL at 17:46

## 2021-01-01 RX ADMIN — ACETAMINOPHEN 650 MG: 325 TABLET, FILM COATED ORAL at 12:59

## 2021-01-01 RX ADMIN — OXYCODONE HYDROCHLORIDE 2.5 MG: 5 TABLET ORAL at 18:04

## 2021-01-01 RX ADMIN — OXYCODONE HYDROCHLORIDE 2.5 MG: 5 TABLET ORAL at 10:11

## 2021-01-01 RX ADMIN — ASPIRIN 81 MG: 81 TABLET, COATED ORAL at 08:46

## 2021-01-01 RX ADMIN — PRAVASTATIN SODIUM 80 MG: 20 TABLET ORAL at 18:12

## 2021-01-01 RX ADMIN — OXYCODONE HYDROCHLORIDE 2.5 MG: 5 TABLET ORAL at 17:35

## 2021-01-01 RX ADMIN — ASPIRIN 81 MG: 81 TABLET, COATED ORAL at 08:29

## 2021-01-01 RX ADMIN — ACETAMINOPHEN 650 MG: 325 TABLET, FILM COATED ORAL at 08:51

## 2021-01-01 RX ADMIN — OXYCODONE HYDROCHLORIDE 2.5 MG: 5 TABLET ORAL at 04:43

## 2021-01-01 RX ADMIN — ENOXAPARIN SODIUM 30 MG: 30 INJECTION SUBCUTANEOUS at 08:48

## 2021-01-01 RX ADMIN — BACITRACIN ZINC, NEOMYCIN, POLYMYXIN B 1 SMALL APPLICATION: 400; 3.5; 5 OINTMENT TOPICAL at 22:36

## 2021-01-01 RX ADMIN — CYANOCOBALAMIN TAB 500 MCG 1000 MCG: 500 TAB at 08:17

## 2021-01-01 RX ADMIN — ACETAMINOPHEN 975 MG: 325 TABLET, FILM COATED ORAL at 06:16

## 2021-01-01 RX ADMIN — LEVOTHYROXINE SODIUM 75 MCG: 75 TABLET ORAL at 04:51

## 2021-01-01 RX ADMIN — CYANOCOBALAMIN TAB 500 MCG 1000 MCG: 500 TAB at 10:10

## 2021-01-01 RX ADMIN — FUROSEMIDE 20 MG: 20 TABLET ORAL at 08:59

## 2021-01-01 RX ADMIN — ACETAMINOPHEN 650 MG: 325 TABLET, FILM COATED ORAL at 08:58

## 2021-01-01 RX ADMIN — INSULIN LISPRO 3 UNITS: 100 INJECTION, SOLUTION INTRAVENOUS; SUBCUTANEOUS at 12:31

## 2021-01-01 RX ADMIN — ASPIRIN 81 MG: 81 TABLET, COATED ORAL at 08:51

## 2021-01-01 RX ADMIN — INSULIN LISPRO 3 UNITS: 100 INJECTION, SOLUTION INTRAVENOUS; SUBCUTANEOUS at 18:13

## 2021-01-01 RX ADMIN — INSULIN LISPRO 2 UNITS: 100 INJECTION, SOLUTION INTRAVENOUS; SUBCUTANEOUS at 12:14

## 2021-01-01 RX ADMIN — CYANOCOBALAMIN TAB 500 MCG 1000 MCG: 500 TAB at 08:47

## 2021-01-01 RX ADMIN — ACETAMINOPHEN 650 MG: 325 TABLET, FILM COATED ORAL at 08:15

## 2021-01-01 RX ADMIN — ACETAMINOPHEN 650 MG: 325 TABLET, FILM COATED ORAL at 12:37

## 2021-01-01 RX ADMIN — POLYETHYLENE GLYCOL 3350 17 G: 17 POWDER, FOR SOLUTION ORAL at 08:17

## 2021-01-01 RX ADMIN — PANTOPRAZOLE SODIUM 40 MG: 40 TABLET, DELAYED RELEASE ORAL at 05:04

## 2021-01-01 RX ADMIN — OXYCODONE HYDROCHLORIDE 2.5 MG: 5 TABLET ORAL at 08:28

## 2021-01-01 RX ADMIN — ACETAMINOPHEN 650 MG: 325 TABLET, FILM COATED ORAL at 06:33

## 2021-01-01 RX ADMIN — OXYCODONE HYDROCHLORIDE 5 MG: 5 TABLET ORAL at 01:50

## 2021-01-01 RX ADMIN — OXYCODONE HYDROCHLORIDE 2.5 MG: 5 TABLET ORAL at 12:37

## 2021-01-01 RX ADMIN — OXYCODONE HYDROCHLORIDE 5 MG: 5 TABLET ORAL at 06:50

## 2021-01-01 RX ADMIN — ACETAMINOPHEN 650 MG: 325 TABLET, FILM COATED ORAL at 22:27

## 2021-01-01 RX ADMIN — POTASSIUM CHLORIDE 40 MEQ: 1500 TABLET, EXTENDED RELEASE ORAL at 20:44

## 2021-01-01 RX ADMIN — INSULIN LISPRO 2 UNITS: 100 INJECTION, SOLUTION INTRAVENOUS; SUBCUTANEOUS at 13:29

## 2021-01-01 RX ADMIN — PRAVASTATIN SODIUM 80 MG: 20 TABLET ORAL at 17:10

## 2021-01-01 RX ADMIN — OXYCODONE HYDROCHLORIDE 2.5 MG: 5 TABLET ORAL at 22:08

## 2021-01-01 RX ADMIN — BISACODYL 10 MG: 5 TABLET, COATED ORAL at 14:00

## 2021-01-01 RX ADMIN — PRAVASTATIN SODIUM 80 MG: 20 TABLET ORAL at 17:15

## 2021-01-01 RX ADMIN — PRAVASTATIN SODIUM 80 MG: 20 TABLET ORAL at 17:55

## 2021-01-01 RX ADMIN — LEVOTHYROXINE SODIUM 75 MCG: 75 TABLET ORAL at 06:16

## 2021-01-01 RX ADMIN — ACETAMINOPHEN 650 MG: 325 TABLET, FILM COATED ORAL at 12:52

## 2021-01-01 RX ADMIN — ACETAMINOPHEN 650 MG: 325 TABLET, FILM COATED ORAL at 12:47

## 2021-01-01 RX ADMIN — INSULIN LISPRO 1 UNITS: 100 INJECTION, SOLUTION INTRAVENOUS; SUBCUTANEOUS at 12:47

## 2021-01-01 RX ADMIN — ACETAMINOPHEN 650 MG: 325 TABLET, FILM COATED ORAL at 17:27

## 2021-01-01 RX ADMIN — ACETAMINOPHEN 650 MG: 325 TABLET, FILM COATED ORAL at 18:04

## 2021-01-01 RX ADMIN — DOCUSATE SODIUM 100 MG: 100 CAPSULE ORAL at 10:10

## 2021-01-01 RX ADMIN — INSULIN LISPRO 1 UNITS: 100 INJECTION, SOLUTION INTRAVENOUS; SUBCUTANEOUS at 17:31

## 2021-01-01 RX ADMIN — OXYCODONE HYDROCHLORIDE 5 MG: 5 TABLET ORAL at 02:50

## 2021-01-01 RX ADMIN — ASPIRIN 81 MG: 81 TABLET, COATED ORAL at 08:47

## 2021-01-01 RX ADMIN — INSULIN LISPRO 3 UNITS: 100 INJECTION, SOLUTION INTRAVENOUS; SUBCUTANEOUS at 12:20

## 2021-01-01 RX ADMIN — ENOXAPARIN SODIUM 30 MG: 30 INJECTION SUBCUTANEOUS at 08:59

## 2021-01-01 RX ADMIN — INSULIN LISPRO 2 UNITS: 100 INJECTION, SOLUTION INTRAVENOUS; SUBCUTANEOUS at 12:22

## 2021-01-01 RX ADMIN — ACETAMINOPHEN 650 MG: 325 TABLET, FILM COATED ORAL at 15:59

## 2021-01-01 RX ADMIN — LEVOTHYROXINE SODIUM 75 MCG: 75 TABLET ORAL at 05:42

## 2021-01-01 RX ADMIN — POLYETHYLENE GLYCOL 3350 17 G: 17 POWDER, FOR SOLUTION ORAL at 14:02

## 2021-01-01 RX ADMIN — ACETAMINOPHEN 650 MG: 325 TABLET, FILM COATED ORAL at 17:29

## 2021-01-01 RX ADMIN — SODIUM CHLORIDE 75 ML/HR: 0.9 INJECTION, SOLUTION INTRAVENOUS at 10:15

## 2021-01-01 RX ADMIN — LIDOCAINE 5% 1 PATCH: 700 PATCH TOPICAL at 21:53

## 2021-01-01 RX ADMIN — DOCUSATE SODIUM 100 MG: 100 CAPSULE ORAL at 17:46

## 2021-01-01 RX ADMIN — FUROSEMIDE 20 MG: 20 TABLET ORAL at 08:09

## 2021-01-01 RX ADMIN — ACETAMINOPHEN 650 MG: 325 TABLET, FILM COATED ORAL at 12:24

## 2021-01-01 RX ADMIN — OXYCODONE HYDROCHLORIDE 2.5 MG: 5 TABLET ORAL at 21:41

## 2021-01-01 RX ADMIN — LIDOCAINE 5% 2 PATCH: 700 PATCH TOPICAL at 08:59

## 2021-01-01 RX ADMIN — ACETAMINOPHEN 650 MG: 325 TABLET, FILM COATED ORAL at 21:31

## 2021-01-01 RX ADMIN — OXYCODONE HYDROCHLORIDE 2.5 MG: 5 TABLET ORAL at 08:47

## 2021-01-01 RX ADMIN — ASPIRIN 81 MG: 81 TABLET, COATED ORAL at 08:15

## 2021-01-01 RX ADMIN — MAGNESIUM SULFATE HEPTAHYDRATE 2 G: 40 INJECTION, SOLUTION INTRAVENOUS at 12:53

## 2021-01-01 RX ADMIN — ENOXAPARIN SODIUM 30 MG: 30 INJECTION SUBCUTANEOUS at 09:50

## 2021-01-01 RX ADMIN — DOCUSATE SODIUM 100 MG: 100 CAPSULE ORAL at 23:31

## 2021-01-01 RX ADMIN — ACETAMINOPHEN 650 MG: 325 TABLET, FILM COATED ORAL at 21:00

## 2021-01-01 RX ADMIN — PANTOPRAZOLE SODIUM 40 MG: 40 TABLET, DELAYED RELEASE ORAL at 05:01

## 2021-01-01 RX ADMIN — ASPIRIN 81 MG: 81 TABLET, COATED ORAL at 10:14

## 2021-01-01 RX ADMIN — Medication 3 MG: at 21:31

## 2021-01-01 RX ADMIN — OXYCODONE HYDROCHLORIDE 5 MG: 5 TABLET ORAL at 21:04

## 2021-01-01 RX ADMIN — OXYCODONE HYDROCHLORIDE 2.5 MG: 5 TABLET ORAL at 09:58

## 2021-01-01 RX ADMIN — CYANOCOBALAMIN TAB 500 MCG 1000 MCG: 500 TAB at 08:46

## 2021-01-01 RX ADMIN — PRAVASTATIN SODIUM 80 MG: 20 TABLET ORAL at 17:17

## 2021-01-01 RX ADMIN — LEVOTHYROXINE SODIUM 75 MCG: 75 TABLET ORAL at 05:15

## 2021-01-01 RX ADMIN — ASPIRIN 81 MG: 81 TABLET, COATED ORAL at 08:17

## 2021-01-01 RX ADMIN — ACETAMINOPHEN 650 MG: 325 TABLET, FILM COATED ORAL at 09:53

## 2021-01-01 RX ADMIN — SODIUM CHLORIDE 500 ML: 0.9 INJECTION, SOLUTION INTRAVENOUS at 18:41

## 2021-01-01 RX ADMIN — OXYCODONE HYDROCHLORIDE 2.5 MG: 5 TABLET ORAL at 17:51

## 2021-01-01 RX ADMIN — OXYCODONE HYDROCHLORIDE 5 MG: 5 TABLET ORAL at 12:30

## 2021-01-01 RX ADMIN — ENOXAPARIN SODIUM 30 MG: 30 INJECTION SUBCUTANEOUS at 09:02

## 2021-01-01 RX ADMIN — OXYCODONE HYDROCHLORIDE 2.5 MG: 5 TABLET ORAL at 02:17

## 2021-01-01 RX ADMIN — ACETAMINOPHEN 650 MG: 325 TABLET, FILM COATED ORAL at 17:15

## 2021-01-01 RX ADMIN — LEVOTHYROXINE SODIUM 75 MCG: 75 TABLET ORAL at 05:09

## 2021-01-01 RX ADMIN — INSULIN LISPRO 1 UNITS: 100 INJECTION, SOLUTION INTRAVENOUS; SUBCUTANEOUS at 12:16

## 2021-01-01 RX ADMIN — INSULIN LISPRO 1 UNITS: 100 INJECTION, SOLUTION INTRAVENOUS; SUBCUTANEOUS at 22:04

## 2021-01-01 RX ADMIN — ACETAMINOPHEN 650 MG: 325 TABLET, FILM COATED ORAL at 08:47

## 2021-01-01 RX ADMIN — OXYCODONE HYDROCHLORIDE 2.5 MG: 5 TABLET ORAL at 09:53

## 2021-01-01 RX ADMIN — POLYETHYLENE GLYCOL 3350 17 G: 17 POWDER, FOR SOLUTION ORAL at 08:49

## 2021-01-01 RX ADMIN — CALCIUM CARBONATE (ANTACID) CHEW TAB 500 MG 500 MG: 500 CHEW TAB at 22:35

## 2021-01-01 RX ADMIN — ENOXAPARIN SODIUM 30 MG: 30 INJECTION SUBCUTANEOUS at 08:25

## 2021-01-01 RX ADMIN — OXYCODONE HYDROCHLORIDE 2.5 MG: 5 TABLET ORAL at 21:50

## 2021-01-01 RX ADMIN — DOCUSATE SODIUM 100 MG: 100 CAPSULE ORAL at 18:05

## 2021-01-01 RX ADMIN — INSULIN LISPRO 2 UNITS: 100 INJECTION, SOLUTION INTRAVENOUS; SUBCUTANEOUS at 12:30

## 2021-01-01 RX ADMIN — ACETAMINOPHEN 650 MG: 325 TABLET, FILM COATED ORAL at 03:06

## 2021-01-01 RX ADMIN — LEVOTHYROXINE SODIUM 75 MCG: 75 TABLET ORAL at 05:19

## 2021-01-01 RX ADMIN — STANDARDIZED SENNA CONCENTRATE 17.2 MG: 8.6 TABLET ORAL at 21:40

## 2021-01-01 RX ADMIN — DOCUSATE SODIUM 100 MG: 100 CAPSULE ORAL at 10:15

## 2021-01-01 RX ADMIN — ACETAMINOPHEN 650 MG: 325 TABLET, FILM COATED ORAL at 21:25

## 2021-01-01 RX ADMIN — PRAVASTATIN SODIUM 80 MG: 20 TABLET ORAL at 17:29

## 2021-01-01 RX ADMIN — ENOXAPARIN SODIUM 30 MG: 30 INJECTION SUBCUTANEOUS at 08:33

## 2021-01-01 RX ADMIN — PANTOPRAZOLE SODIUM 40 MG: 40 TABLET, DELAYED RELEASE ORAL at 05:19

## 2021-01-01 RX ADMIN — OXYCODONE HYDROCHLORIDE 2.5 MG: 5 TABLET ORAL at 03:06

## 2021-01-01 RX ADMIN — ACETAMINOPHEN 650 MG: 325 TABLET, FILM COATED ORAL at 17:46

## 2021-01-01 RX ADMIN — OXYCODONE HYDROCHLORIDE 2.5 MG: 5 TABLET ORAL at 17:10

## 2021-01-01 RX ADMIN — ACETAMINOPHEN 650 MG: 325 TABLET, FILM COATED ORAL at 21:36

## 2021-01-01 RX ADMIN — PANTOPRAZOLE SODIUM 40 MG: 40 TABLET, DELAYED RELEASE ORAL at 05:09

## 2021-01-01 RX ADMIN — SODIUM CHLORIDE 75 ML/HR: 0.9 INJECTION, SOLUTION INTRAVENOUS at 17:54

## 2021-01-01 RX ADMIN — ASPIRIN 81 MG: 81 TABLET, COATED ORAL at 08:37

## 2021-01-01 RX ADMIN — CYANOCOBALAMIN TAB 500 MCG 1000 MCG: 500 TAB at 10:14

## 2021-01-01 RX ADMIN — PRAVASTATIN SODIUM 80 MG: 20 TABLET ORAL at 17:58

## 2021-01-01 RX ADMIN — ACETAMINOPHEN 650 MG: 325 TABLET, FILM COATED ORAL at 12:08

## 2021-01-01 RX ADMIN — OXYCODONE HYDROCHLORIDE 2.5 MG: 5 TABLET ORAL at 23:30

## 2021-01-01 RX ADMIN — POLYETHYLENE GLYCOL 3350 17 G: 17 POWDER, FOR SOLUTION ORAL at 08:34

## 2021-01-01 RX ADMIN — OXYCODONE HYDROCHLORIDE 5 MG: 5 TABLET ORAL at 20:53

## 2021-01-01 RX ADMIN — ASPIRIN 81 MG: 81 TABLET, COATED ORAL at 08:09

## 2021-01-01 RX ADMIN — ENOXAPARIN SODIUM 30 MG: 30 INJECTION SUBCUTANEOUS at 10:19

## 2021-01-01 RX ADMIN — OXYCODONE HYDROCHLORIDE 2.5 MG: 5 TABLET ORAL at 08:17

## 2021-01-01 RX ADMIN — Medication 3 MG: at 20:52

## 2021-01-01 RX ADMIN — OXYCODONE HYDROCHLORIDE 2.5 MG: 5 TABLET ORAL at 14:00

## 2021-01-01 RX ADMIN — PRAVASTATIN SODIUM 80 MG: 20 TABLET ORAL at 18:02

## 2021-01-01 RX ADMIN — ACETAMINOPHEN 650 MG: 325 TABLET, FILM COATED ORAL at 11:57

## 2021-01-01 RX ADMIN — OXYCODONE HYDROCHLORIDE 5 MG: 5 TABLET ORAL at 02:21

## 2021-01-01 RX ADMIN — LIDOCAINE 5% 2 PATCH: 700 PATCH TOPICAL at 08:47

## 2021-01-01 RX ADMIN — ENOXAPARIN SODIUM 30 MG: 30 INJECTION SUBCUTANEOUS at 08:18

## 2021-01-01 RX ADMIN — INSULIN LISPRO 1 UNITS: 100 INJECTION, SOLUTION INTRAVENOUS; SUBCUTANEOUS at 21:51

## 2021-01-01 RX ADMIN — ACETAMINOPHEN 650 MG: 325 TABLET, FILM COATED ORAL at 08:09

## 2021-01-01 RX ADMIN — DOCUSATE SODIUM 100 MG: 100 CAPSULE ORAL at 08:43

## 2021-01-01 RX ADMIN — ASPIRIN 81 MG: 81 TABLET, COATED ORAL at 09:44

## 2021-01-01 RX ADMIN — PRAVASTATIN SODIUM 80 MG: 20 TABLET ORAL at 17:34

## 2021-01-01 RX ADMIN — OXYCODONE HYDROCHLORIDE 2.5 MG: 5 TABLET ORAL at 17:14

## 2021-01-01 RX ADMIN — LEVOTHYROXINE SODIUM 75 MCG: 75 TABLET ORAL at 06:05

## 2021-01-01 RX ADMIN — ACETAMINOPHEN 975 MG: 325 TABLET, FILM COATED ORAL at 21:41

## 2021-01-01 RX ADMIN — INSULIN LISPRO 2 UNITS: 100 INJECTION, SOLUTION INTRAVENOUS; SUBCUTANEOUS at 18:28

## 2021-01-01 RX ADMIN — LIDOCAINE HYDROCHLORIDE 1 APPLICATION: 20 JELLY TOPICAL at 21:46

## 2021-01-01 RX ADMIN — DOCUSATE SODIUM 100 MG: 100 CAPSULE ORAL at 17:58

## 2021-01-01 RX ADMIN — OXYCODONE HYDROCHLORIDE 5 MG: 5 TABLET ORAL at 02:17

## 2021-01-01 RX ADMIN — ENOXAPARIN SODIUM 30 MG: 30 INJECTION SUBCUTANEOUS at 08:44

## 2021-01-01 RX ADMIN — ENOXAPARIN SODIUM 30 MG: 30 INJECTION SUBCUTANEOUS at 08:54

## 2021-01-01 RX ADMIN — INSULIN LISPRO 1 UNITS: 100 INJECTION, SOLUTION INTRAVENOUS; SUBCUTANEOUS at 22:00

## 2021-01-01 RX ADMIN — OXYCODONE HYDROCHLORIDE 5 MG: 5 TABLET ORAL at 04:51

## 2021-01-01 RX ADMIN — ACETAMINOPHEN 650 MG: 325 TABLET, FILM COATED ORAL at 17:55

## 2021-01-01 RX ADMIN — OXYCODONE HYDROCHLORIDE 5 MG: 5 TABLET ORAL at 11:52

## 2021-01-01 RX ADMIN — INSULIN LISPRO 1 UNITS: 100 INJECTION, SOLUTION INTRAVENOUS; SUBCUTANEOUS at 22:10

## 2021-01-01 RX ADMIN — ACETAMINOPHEN 650 MG: 325 TABLET, FILM COATED ORAL at 21:24

## 2021-01-01 RX ADMIN — CYANOCOBALAMIN TAB 500 MCG 1000 MCG: 500 TAB at 08:25

## 2021-01-01 RX ADMIN — ENOXAPARIN SODIUM 30 MG: 30 INJECTION SUBCUTANEOUS at 08:15

## 2021-01-01 RX ADMIN — LIDOCAINE 5% 2 PATCH: 700 PATCH TOPICAL at 08:38

## 2021-01-01 RX ADMIN — POLYETHYLENE GLYCOL 3350 17 G: 17 POWDER, FOR SOLUTION ORAL at 08:28

## 2021-01-01 RX ADMIN — ACETAMINOPHEN 650 MG: 325 TABLET, FILM COATED ORAL at 08:36

## 2021-01-01 RX ADMIN — FUROSEMIDE 20 MG: 20 TABLET ORAL at 08:34

## 2021-01-01 RX ADMIN — ENOXAPARIN SODIUM 30 MG: 30 INJECTION SUBCUTANEOUS at 09:45

## 2021-01-01 RX ADMIN — INSULIN LISPRO 1 UNITS: 100 INJECTION, SOLUTION INTRAVENOUS; SUBCUTANEOUS at 18:01

## 2021-01-01 RX ADMIN — CYANOCOBALAMIN TAB 500 MCG 1000 MCG: 500 TAB at 09:44

## 2021-01-01 RX ADMIN — OXYCODONE HYDROCHLORIDE 2.5 MG: 5 TABLET ORAL at 17:29

## 2021-01-01 RX ADMIN — PANTOPRAZOLE SODIUM 40 MG: 40 TABLET, DELAYED RELEASE ORAL at 05:42

## 2021-01-01 RX ADMIN — ASPIRIN 81 MG: 81 TABLET, COATED ORAL at 09:49

## 2021-01-01 RX ADMIN — OXYCODONE HYDROCHLORIDE 2.5 MG: 5 TABLET ORAL at 08:46

## 2021-01-01 RX ADMIN — STANDARDIZED SENNA CONCENTRATE 17.2 MG: 8.6 TABLET ORAL at 21:07

## 2021-01-01 RX ADMIN — INSULIN LISPRO 1 UNITS: 100 INJECTION, SOLUTION INTRAVENOUS; SUBCUTANEOUS at 22:08

## 2021-01-01 RX ADMIN — CYANOCOBALAMIN TAB 500 MCG 1000 MCG: 500 TAB at 09:51

## 2021-01-01 RX ADMIN — OXYCODONE HYDROCHLORIDE 2.5 MG: 5 TABLET ORAL at 17:47

## 2021-01-01 RX ADMIN — STANDARDIZED SENNA CONCENTRATE 17.2 MG: 8.6 TABLET ORAL at 21:31

## 2021-01-01 RX ADMIN — INSULIN LISPRO 1 UNITS: 100 INJECTION, SOLUTION INTRAVENOUS; SUBCUTANEOUS at 12:34

## 2021-01-01 RX ADMIN — LEVOTHYROXINE SODIUM 75 MCG: 75 TABLET ORAL at 06:34

## 2021-01-01 RX ADMIN — ENOXAPARIN SODIUM 30 MG: 30 INJECTION SUBCUTANEOUS at 08:36

## 2021-01-01 RX ADMIN — PRAVASTATIN SODIUM 80 MG: 20 TABLET ORAL at 15:59

## 2021-01-01 RX ADMIN — ACETAMINOPHEN 650 MG: 325 TABLET, FILM COATED ORAL at 08:29

## 2021-01-01 RX ADMIN — STANDARDIZED SENNA CONCENTRATE 17.2 MG: 8.6 TABLET ORAL at 22:00

## 2021-01-01 RX ADMIN — OXYCODONE HYDROCHLORIDE 2.5 MG: 5 TABLET ORAL at 08:32

## 2021-01-01 RX ADMIN — LIDOCAINE 5% 2 PATCH: 700 PATCH TOPICAL at 08:24

## 2021-01-01 RX ADMIN — ACETAMINOPHEN 650 MG: 325 TABLET, FILM COATED ORAL at 12:16

## 2021-01-01 RX ADMIN — STANDARDIZED SENNA CONCENTRATE 17.2 MG: 8.6 TABLET ORAL at 19:14

## 2021-01-01 RX ADMIN — ACETAMINOPHEN 650 MG: 325 TABLET, FILM COATED ORAL at 18:12

## 2021-01-01 RX ADMIN — OXYCODONE HYDROCHLORIDE 2.5 MG: 5 TABLET ORAL at 18:36

## 2021-01-01 RX ADMIN — CYANOCOBALAMIN TAB 500 MCG 1000 MCG: 500 TAB at 08:15

## 2021-01-01 RX ADMIN — CYANOCOBALAMIN TAB 500 MCG 1000 MCG: 500 TAB at 08:09

## 2021-01-01 RX ADMIN — OXYCODONE HYDROCHLORIDE 2.5 MG: 5 TABLET ORAL at 08:48

## 2021-01-01 RX ADMIN — ACETAMINOPHEN 650 MG: 325 TABLET, FILM COATED ORAL at 21:59

## 2021-01-01 RX ADMIN — INSULIN LISPRO 1 UNITS: 100 INJECTION, SOLUTION INTRAVENOUS; SUBCUTANEOUS at 21:37

## 2021-01-01 RX ADMIN — ENOXAPARIN SODIUM 30 MG: 30 INJECTION SUBCUTANEOUS at 08:29

## 2021-01-01 RX ADMIN — OXYCODONE HYDROCHLORIDE 2.5 MG: 5 TABLET ORAL at 08:59

## 2021-01-01 RX ADMIN — LIDOCAINE 5% 1 PATCH: 700 PATCH TOPICAL at 21:57

## 2021-01-01 RX ADMIN — ENOXAPARIN SODIUM 30 MG: 30 INJECTION SUBCUTANEOUS at 08:46

## 2021-01-01 RX ADMIN — ACETAMINOPHEN 975 MG: 325 TABLET, FILM COATED ORAL at 23:29

## 2021-01-01 RX ADMIN — PANTOPRAZOLE SODIUM 40 MG: 40 TABLET, DELAYED RELEASE ORAL at 05:15

## 2021-01-01 RX ADMIN — INSULIN LISPRO 2 UNITS: 100 INJECTION, SOLUTION INTRAVENOUS; SUBCUTANEOUS at 17:50

## 2021-01-01 RX ADMIN — INSULIN LISPRO 1 UNITS: 100 INJECTION, SOLUTION INTRAVENOUS; SUBCUTANEOUS at 21:23

## 2021-01-01 RX ADMIN — FUROSEMIDE 20 MG: 20 TABLET ORAL at 08:46

## 2021-01-01 RX ADMIN — OXYCODONE HYDROCHLORIDE 2.5 MG: 5 TABLET ORAL at 17:27

## 2021-01-01 RX ADMIN — LEVOTHYROXINE SODIUM 75 MCG: 75 TABLET ORAL at 05:04

## 2021-01-01 RX ADMIN — OXYCODONE HYDROCHLORIDE 5 MG: 5 TABLET ORAL at 01:42

## 2021-01-01 RX ADMIN — ACETAMINOPHEN 975 MG: 325 TABLET, FILM COATED ORAL at 05:41

## 2021-01-01 RX ADMIN — LEVOTHYROXINE SODIUM 75 MCG: 75 TABLET ORAL at 05:11

## 2021-01-01 RX ADMIN — OXYCODONE HYDROCHLORIDE 2.5 MG: 5 TABLET ORAL at 12:58

## 2021-01-01 RX ADMIN — OXYCODONE HYDROCHLORIDE 2.5 MG: 5 TABLET ORAL at 12:22

## 2021-01-01 RX ADMIN — ACETAMINOPHEN 650 MG: 325 TABLET, FILM COATED ORAL at 08:17

## 2021-01-01 RX ADMIN — FUROSEMIDE 20 MG: 20 TABLET ORAL at 12:01

## 2021-01-01 RX ADMIN — LIDOCAINE 5% 2 PATCH: 700 PATCH TOPICAL at 08:19

## 2021-01-01 RX ADMIN — Medication 3 MG: at 22:31

## 2021-01-01 RX ADMIN — LIDOCAINE 5% 2 PATCH: 700 PATCH TOPICAL at 08:44

## 2021-01-01 RX ADMIN — ACETAMINOPHEN 650 MG: 325 TABLET, FILM COATED ORAL at 17:10

## 2021-01-01 RX ADMIN — FUROSEMIDE 20 MG: 20 TABLET ORAL at 08:25

## 2021-01-01 RX ADMIN — STANDARDIZED SENNA CONCENTRATE 17.2 MG: 8.6 TABLET ORAL at 23:34

## 2021-01-01 RX ADMIN — INSULIN LISPRO 1 UNITS: 100 INJECTION, SOLUTION INTRAVENOUS; SUBCUTANEOUS at 08:18

## 2021-01-01 RX ADMIN — OXYCODONE HYDROCHLORIDE 2.5 MG: 5 TABLET ORAL at 17:17

## 2021-01-01 RX ADMIN — DOCUSATE SODIUM 100 MG: 100 CAPSULE ORAL at 09:02

## 2021-01-01 RX ADMIN — INSULIN LISPRO 4 UNITS: 100 INJECTION, SOLUTION INTRAVENOUS; SUBCUTANEOUS at 13:00

## 2021-01-01 RX ADMIN — ENOXAPARIN SODIUM 30 MG: 30 INJECTION SUBCUTANEOUS at 10:15

## 2021-01-01 RX ADMIN — ACETAMINOPHEN 650 MG: 325 TABLET, FILM COATED ORAL at 14:00

## 2021-01-01 RX ADMIN — CYANOCOBALAMIN TAB 500 MCG 1000 MCG: 500 TAB at 09:01

## 2021-01-01 RX ADMIN — ACETAMINOPHEN 650 MG: 325 TABLET, FILM COATED ORAL at 21:07

## 2021-01-01 RX ADMIN — ACETAMINOPHEN 650 MG: 325 TABLET, FILM COATED ORAL at 08:46

## 2021-01-01 RX ADMIN — Medication 3 MG: at 19:58

## 2021-01-01 RX ADMIN — OXYCODONE HYDROCHLORIDE 2.5 MG: 5 TABLET ORAL at 08:45

## 2021-01-01 RX ADMIN — ACETAMINOPHEN 650 MG: 325 TABLET, FILM COATED ORAL at 21:37

## 2021-01-01 RX ADMIN — INSULIN LISPRO 1 UNITS: 100 INJECTION, SOLUTION INTRAVENOUS; SUBCUTANEOUS at 22:27

## 2021-01-01 RX ADMIN — INSULIN LISPRO 3 UNITS: 100 INJECTION, SOLUTION INTRAVENOUS; SUBCUTANEOUS at 12:34

## 2021-01-01 RX ADMIN — ASPIRIN 81 MG: 81 TABLET, COATED ORAL at 08:43

## 2021-01-01 RX ADMIN — DOCUSATE SODIUM 100 MG: 100 CAPSULE ORAL at 09:44

## 2021-01-01 RX ADMIN — INSULIN LISPRO 1 UNITS: 100 INJECTION, SOLUTION INTRAVENOUS; SUBCUTANEOUS at 18:07

## 2021-01-01 RX ADMIN — ACETAMINOPHEN 650 MG: 325 TABLET, FILM COATED ORAL at 17:34

## 2021-01-01 RX ADMIN — OXYCODONE HYDROCHLORIDE 2.5 MG: 5 TABLET ORAL at 04:02

## 2021-01-01 RX ADMIN — OXYCODONE HYDROCHLORIDE 2.5 MG: 5 TABLET ORAL at 12:24

## 2021-01-01 RX ADMIN — INSULIN LISPRO 1 UNITS: 100 INJECTION, SOLUTION INTRAVENOUS; SUBCUTANEOUS at 21:42

## 2021-01-01 RX ADMIN — OXYCODONE HYDROCHLORIDE 2.5 MG: 5 TABLET ORAL at 12:16

## 2021-01-01 RX ADMIN — ACETAMINOPHEN 650 MG: 325 TABLET, FILM COATED ORAL at 08:33

## 2021-01-01 RX ADMIN — OXYCODONE HYDROCHLORIDE 2.5 MG: 5 TABLET ORAL at 18:13

## 2021-01-01 RX ADMIN — ACETAMINOPHEN 650 MG: 325 TABLET, FILM COATED ORAL at 13:03

## 2021-01-01 RX ADMIN — PANTOPRAZOLE SODIUM 40 MG: 40 TABLET, DELAYED RELEASE ORAL at 04:50

## 2021-01-01 RX ADMIN — ACETAMINOPHEN 650 MG: 325 TABLET, FILM COATED ORAL at 18:02

## 2021-01-01 RX ADMIN — FUROSEMIDE 20 MG: 20 TABLET ORAL at 08:51

## 2021-01-01 RX ADMIN — PANTOPRAZOLE SODIUM 40 MG: 40 TABLET, DELAYED RELEASE ORAL at 05:07

## 2021-01-01 RX ADMIN — OXYCODONE HYDROCHLORIDE 2.5 MG: 5 TABLET ORAL at 08:37

## 2021-01-01 RX ADMIN — INSULIN LISPRO 1 UNITS: 100 INJECTION, SOLUTION INTRAVENOUS; SUBCUTANEOUS at 23:33

## 2021-01-01 RX ADMIN — DOCUSATE SODIUM 100 MG: 100 CAPSULE ORAL at 20:44

## 2021-01-01 RX ADMIN — CYANOCOBALAMIN TAB 500 MCG 1000 MCG: 500 TAB at 08:29

## 2021-01-01 RX ADMIN — CYANOCOBALAMIN TAB 500 MCG 1000 MCG: 500 TAB at 08:37

## 2021-01-01 RX ADMIN — LIDOCAINE 5% 2 PATCH: 700 PATCH TOPICAL at 08:34

## 2021-01-01 RX ADMIN — LIDOCAINE 5% 2 PATCH: 700 PATCH TOPICAL at 08:53

## 2021-01-01 RX ADMIN — LIDOCAINE 5% 2 PATCH: 700 PATCH TOPICAL at 10:21

## 2021-01-01 RX ADMIN — OXYCODONE HYDROCHLORIDE 2.5 MG: 5 TABLET ORAL at 12:47

## 2021-01-01 RX ADMIN — PANTOPRAZOLE SODIUM 40 MG: 40 TABLET, DELAYED RELEASE ORAL at 06:16

## 2021-01-01 RX ADMIN — OXYCODONE HYDROCHLORIDE 2.5 MG: 5 TABLET ORAL at 12:01

## 2021-01-01 RX ADMIN — OXYCODONE HYDROCHLORIDE 5 MG: 5 TABLET ORAL at 05:09

## 2021-01-01 RX ADMIN — LIDOCAINE 5% 2 PATCH: 700 PATCH TOPICAL at 08:10

## 2021-01-01 RX ADMIN — OXYCODONE HYDROCHLORIDE 2.5 MG: 5 TABLET ORAL at 08:43

## 2021-01-01 RX ADMIN — LEVOTHYROXINE SODIUM 75 MCG: 75 TABLET ORAL at 05:25

## 2021-01-01 RX ADMIN — STANDARDIZED SENNA CONCENTRATE 17.2 MG: 8.6 TABLET ORAL at 21:00

## 2021-01-01 RX ADMIN — OXYCODONE HYDROCHLORIDE 5 MG: 5 TABLET ORAL at 15:58

## 2021-01-01 RX ADMIN — PRAVASTATIN SODIUM 80 MG: 20 TABLET ORAL at 16:44

## 2021-01-01 RX ADMIN — INSULIN LISPRO 1 UNITS: 100 INJECTION, SOLUTION INTRAVENOUS; SUBCUTANEOUS at 22:30

## 2021-01-01 RX ADMIN — PANTOPRAZOLE SODIUM 40 MG: 40 TABLET, DELAYED RELEASE ORAL at 06:34

## 2021-01-01 RX ADMIN — ACETAMINOPHEN 650 MG: 325 TABLET, FILM COATED ORAL at 17:17

## 2021-01-01 RX ADMIN — INSULIN LISPRO 2 UNITS: 100 INJECTION, SOLUTION INTRAVENOUS; SUBCUTANEOUS at 17:41

## 2021-01-01 RX ADMIN — INSULIN LISPRO 2 UNITS: 100 INJECTION, SOLUTION INTRAVENOUS; SUBCUTANEOUS at 13:07

## 2021-01-01 RX ADMIN — CYANOCOBALAMIN TAB 500 MCG 1000 MCG: 500 TAB at 08:43

## 2021-01-01 RX ADMIN — CYANOCOBALAMIN TAB 500 MCG 1000 MCG: 500 TAB at 08:58

## 2021-01-01 RX ADMIN — OXYCODONE HYDROCHLORIDE 2.5 MG: 5 TABLET ORAL at 14:38

## 2021-01-01 RX ADMIN — ASPIRIN 81 MG: 81 TABLET, COATED ORAL at 08:58

## 2021-01-01 RX ADMIN — STANDARDIZED SENNA CONCENTRATE 17.2 MG: 8.6 TABLET ORAL at 22:27

## 2021-01-01 RX ADMIN — ACETAMINOPHEN 650 MG: 325 TABLET, FILM COATED ORAL at 13:05

## 2021-01-01 RX ADMIN — ACETAMINOPHEN 650 MG: 325 TABLET, FILM COATED ORAL at 12:00

## 2021-01-01 RX ADMIN — LEVOTHYROXINE SODIUM 75 MCG: 75 TABLET ORAL at 06:18

## 2021-01-01 RX ADMIN — PRAVASTATIN SODIUM 80 MG: 20 TABLET ORAL at 18:28

## 2021-01-01 RX ADMIN — INSULIN LISPRO 2 UNITS: 100 INJECTION, SOLUTION INTRAVENOUS; SUBCUTANEOUS at 18:07

## 2021-01-01 RX ADMIN — INSULIN LISPRO 1 UNITS: 100 INJECTION, SOLUTION INTRAVENOUS; SUBCUTANEOUS at 17:05

## 2021-01-01 RX ADMIN — OXYCODONE HYDROCHLORIDE 5 MG: 5 TABLET ORAL at 05:19

## 2021-01-01 RX ADMIN — ENOXAPARIN SODIUM 30 MG: 30 INJECTION SUBCUTANEOUS at 08:10

## 2021-01-01 RX ADMIN — POLYETHYLENE GLYCOL 3350 17 G: 17 POWDER, FOR SOLUTION ORAL at 09:02

## 2021-01-01 RX ADMIN — LIDOCAINE 5% 2 PATCH: 700 PATCH TOPICAL at 08:16

## 2021-01-01 RX ADMIN — PANTOPRAZOLE SODIUM 40 MG: 40 TABLET, DELAYED RELEASE ORAL at 06:05

## 2021-01-01 RX ADMIN — PRAVASTATIN SODIUM 80 MG: 20 TABLET ORAL at 18:05

## 2021-01-01 RX ADMIN — OXYCODONE HYDROCHLORIDE 5 MG: 5 TABLET ORAL at 21:31

## 2021-01-01 RX ADMIN — POLYETHYLENE GLYCOL 3350 17 G: 17 POWDER, FOR SOLUTION ORAL at 08:44

## 2021-01-01 RX ADMIN — ACETAMINOPHEN 650 MG: 325 TABLET, FILM COATED ORAL at 16:44

## 2021-01-01 RX ADMIN — FUROSEMIDE 20 MG: 20 TABLET ORAL at 09:50

## 2021-01-01 RX ADMIN — ASPIRIN 81 MG: 81 TABLET, COATED ORAL at 08:25

## 2021-01-01 RX ADMIN — INSULIN LISPRO 1 UNITS: 100 INJECTION, SOLUTION INTRAVENOUS; SUBCUTANEOUS at 21:06

## 2021-01-01 RX ADMIN — CYANOCOBALAMIN TAB 500 MCG 1000 MCG: 500 TAB at 09:46

## 2021-01-01 RX ADMIN — OXYCODONE HYDROCHLORIDE 2.5 MG: 5 TABLET ORAL at 16:44

## 2021-01-01 RX ADMIN — SODIUM CHLORIDE 75 ML/HR: 0.9 INJECTION, SOLUTION INTRAVENOUS at 09:00

## 2021-01-01 RX ADMIN — LEVOTHYROXINE SODIUM 75 MCG: 75 TABLET ORAL at 05:01

## 2021-01-01 RX ADMIN — ACETAMINOPHEN 650 MG: 325 TABLET, FILM COATED ORAL at 17:51

## 2021-01-01 RX ADMIN — OXYCODONE HYDROCHLORIDE 2.5 MG: 5 TABLET ORAL at 18:02

## 2021-01-01 RX ADMIN — INSULIN LISPRO 1 UNITS: 100 INJECTION, SOLUTION INTRAVENOUS; SUBCUTANEOUS at 21:34

## 2021-01-01 RX ADMIN — STANDARDIZED SENNA CONCENTRATE 17.2 MG: 8.6 TABLET ORAL at 21:24

## 2021-01-01 RX ADMIN — ACETAMINOPHEN 650 MG: 325 TABLET, FILM COATED ORAL at 12:30

## 2021-01-01 RX ADMIN — OXYCODONE HYDROCHLORIDE 5 MG: 5 TABLET ORAL at 08:09

## 2021-01-01 RX ADMIN — ACETAMINOPHEN 650 MG: 325 TABLET, FILM COATED ORAL at 19:14

## 2021-01-01 RX ADMIN — PRAVASTATIN SODIUM 80 MG: 20 TABLET ORAL at 17:26

## 2021-01-01 RX ADMIN — CYANOCOBALAMIN TAB 500 MCG 1000 MCG: 500 TAB at 08:50

## 2021-01-01 RX ADMIN — INSULIN LISPRO 1 UNITS: 100 INJECTION, SOLUTION INTRAVENOUS; SUBCUTANEOUS at 21:57

## 2021-01-01 RX ADMIN — PANTOPRAZOLE SODIUM 40 MG: 40 TABLET, DELAYED RELEASE ORAL at 05:25

## 2021-01-01 RX ADMIN — FUROSEMIDE 20 MG: 20 TABLET ORAL at 08:15

## 2021-01-01 RX ADMIN — OXYCODONE HYDROCHLORIDE 2.5 MG: 5 TABLET ORAL at 12:29

## 2021-01-01 RX ADMIN — ENOXAPARIN SODIUM 30 MG: 30 INJECTION SUBCUTANEOUS at 09:51

## 2021-01-01 RX ADMIN — ASPIRIN 81 MG: 81 TABLET, COATED ORAL at 08:34

## 2021-01-01 RX ADMIN — INSULIN LISPRO 2 UNITS: 100 INJECTION, SOLUTION INTRAVENOUS; SUBCUTANEOUS at 14:07

## 2021-01-01 RX ADMIN — INSULIN LISPRO 1 UNITS: 100 INJECTION, SOLUTION INTRAVENOUS; SUBCUTANEOUS at 11:53

## 2021-01-01 RX ADMIN — Medication 3 MG: at 22:00

## 2021-01-01 RX ADMIN — ASPIRIN 81 MG: 81 TABLET, COATED ORAL at 09:02

## 2021-01-01 RX ADMIN — INSULIN LISPRO 1 UNITS: 100 INJECTION, SOLUTION INTRAVENOUS; SUBCUTANEOUS at 09:08

## 2021-01-01 RX ADMIN — OXYCODONE HYDROCHLORIDE 2.5 MG: 5 TABLET ORAL at 17:54

## 2021-01-01 RX ADMIN — ASPIRIN 81 MG: 81 TABLET, COATED ORAL at 10:10

## 2021-01-01 RX ADMIN — PRAVASTATIN SODIUM 80 MG: 20 TABLET ORAL at 17:52

## 2021-01-01 RX ADMIN — FUROSEMIDE 20 MG: 20 TABLET ORAL at 09:51

## 2021-01-01 RX ADMIN — STANDARDIZED SENNA CONCENTRATE 17.2 MG: 8.6 TABLET ORAL at 21:59

## 2021-01-01 RX ADMIN — LIDOCAINE 5% 2 PATCH: 700 PATCH TOPICAL at 08:48

## 2021-01-01 RX ADMIN — STANDARDIZED SENNA CONCENTRATE 17.2 MG: 8.6 TABLET ORAL at 21:25

## 2021-01-01 RX ADMIN — Medication 3 MG: at 21:41

## 2021-01-01 RX ADMIN — OXYCODONE HYDROCHLORIDE 2.5 MG: 5 TABLET ORAL at 12:10

## 2021-01-01 RX ADMIN — LEVOTHYROXINE SODIUM 75 MCG: 75 TABLET ORAL at 04:55

## 2021-01-01 RX ADMIN — ACETAMINOPHEN 650 MG: 325 TABLET, FILM COATED ORAL at 12:29

## 2021-01-01 RX ADMIN — ASPIRIN 81 MG: 81 TABLET, COATED ORAL at 09:51

## 2021-01-01 RX ADMIN — LEVOTHYROXINE SODIUM 75 MCG: 75 TABLET ORAL at 05:10

## 2021-01-01 RX ADMIN — IOHEXOL 85 ML: 350 INJECTION, SOLUTION INTRAVENOUS at 11:57

## 2021-01-01 RX ADMIN — DOCUSATE SODIUM 100 MG: 100 CAPSULE ORAL at 18:28

## 2021-01-01 RX ADMIN — PANTOPRAZOLE SODIUM 40 MG: 40 TABLET, DELAYED RELEASE ORAL at 04:55

## 2021-01-01 RX ADMIN — ACETAMINOPHEN 650 MG: 325 TABLET, FILM COATED ORAL at 21:56

## 2021-05-03 NOTE — ED PROVIDER NOTES
History  Chief Complaint   Patient presents with    Laceration     pt states she opened a storm door and it cut her left arm  Pt on blood thinners  80year old female presents for evaluation of left upper extremity skin tear sustained when a storm door caught her arm this evening  Patient is right handed  No recent illness  Last tetanus 2014  History provided by:  Patient  Laceration  Location:  Shoulder/arm  Shoulder/arm laceration location:  L forearm  Length:  5  Depth:  Cutaneous  Quality comment:  Skin tear  Bleeding: controlled with pressure    Time since incident:  1 hour  Injury mechanism: storm door  Pain details:     Quality:  Burning    Severity:  Moderate    Timing:  Constant    Progression:  Unchanged  Foreign body present:  No foreign bodies  Relieved by:  Pressure  Worsened by:  Nothing  Ineffective treatments:  None tried  Tetanus status:  Up to date  Associated symptoms: no fever and no rash        Prior to Admission Medications   Prescriptions Last Dose Informant Patient Reported? Taking?    Cyanocobalamin (B-12) 1000 MCG CAPS  Self Yes No   Sig: Take 1 tablet by mouth daily   acetaminophen (TYLENOL) 325 mg tablet   No No   Sig: Take 2 tablets (650 mg total) by mouth every 6 (six) hours as needed for mild pain, headaches or fever   aspirin 81 MG tablet  Self Yes No   Sig: Take 1 tablet by mouth daily   diclofenac sodium (VOLTAREN) 1 %   No No   Sig: Apply 2 g topically 4 (four) times a day   docusate sodium (COLACE) 100 mg capsule   No No   Sig: Take 1 capsule (100 mg total) by mouth 2 (two) times a day   esomeprazole (NexIUM) 40 MG capsule   Yes No   Sig: Take 1 capsule by mouth Daily   furosemide (LASIX) 20 mg tablet   Yes No   Sig: Take 20 mg by mouth   levothyroxine 75 mcg tablet  Self Yes No   Sig: Take 1 tablet by mouth daily   magnesium chloride (MAG64) 64 MG TBEC EC tablet   Yes No   Sig: Take 1 tablet by mouth   metFORMIN (GLUCOPHAGE-XR) 750 mg 24 hr tablet   Yes No   Sig: TAKE 1 TABLET (750 MG TOTAL) BY MOUTH DAILY WITH BREAKFAST    omeprazole (PRILOSEC) 20 mg delayed release capsule  Self Yes No   Sig: Take 1 capsule by mouth daily   simvastatin (ZOCOR) 40 mg tablet  Self Yes No   Sig: Take 1 tablet by mouth daily   sitaGLIPtin (JANUVIA) 25 mg tablet   Yes No   Sig: Take 25 mg by mouth daily      Facility-Administered Medications: None       Past Medical History:   Diagnosis Date    Acquired absence of kidney     Frequency of micturition     GERD (gastroesophageal reflux disease)     Hypertension     Malignant neoplasm of left kidney, except renal pelvis (HCC)     Nocturia     Personal history of other malignant neoplasm of kidney     Renal mass, left     UTI (urinary tract infection)        Past Surgical History:   Procedure Laterality Date    RADICAL NEPHRECTOMY Left 2013       Family History   Problem Relation Age of Onset    Diabetes Father     Heart failure Brother      I have reviewed and agree with the history as documented  E-Cigarette/Vaping     E-Cigarette/Vaping Substances     Social History     Tobacco Use    Smoking status: Never Smoker    Smokeless tobacco: Never Used   Substance Use Topics    Alcohol use: Never     Frequency: Never    Drug use: Never       Review of Systems   Constitutional: Negative for appetite change, chills and fever  HENT: Negative for congestion and sore throat  Respiratory: Negative for cough, chest tightness and shortness of breath  Cardiovascular: Negative for chest pain and leg swelling  Gastrointestinal: Negative for abdominal pain, constipation, diarrhea, nausea and vomiting  Musculoskeletal: Negative for myalgias  Skin: Negative for rash and wound  Neurological: Negative for dizziness, syncope and headaches  All other systems reviewed and are negative  Physical Exam  Physical Exam  Vitals signs and nursing note reviewed  Constitutional:       General: She is not in acute distress       Appearance: She is well-developed  She is not toxic-appearing or diaphoretic  HENT:      Head: Normocephalic and atraumatic  Right Ear: External ear normal       Left Ear: External ear normal       Nose: Nose normal    Eyes:      General: No scleral icterus  Pulmonary:      Effort: Pulmonary effort is normal  No respiratory distress  Abdominal:      General: There is no distension  Musculoskeletal: Normal range of motion  General: No deformity  Comments: Full ROM left shoulder, elbow and wrist  No bony tenderness  Normal distal sensation and perfusion  Skin:     General: Skin is warm and dry  Findings: No rash  Comments: 5 cm skin tear to the left forearm just distal to elbow  Neurological:      General: No focal deficit present  Mental Status: She is alert and oriented to person, place, and time     Psychiatric:         Mood and Affect: Mood normal          Vital Signs  ED Triage Vitals [05/02/21 2125]   Temperature Pulse Respirations Blood Pressure SpO2   (!) 96 9 °F (36 1 °C) 64 14 145/67 99 %      Temp Source Heart Rate Source Patient Position - Orthostatic VS BP Location FiO2 (%)   Temporal Monitor Lying Left arm --      Pain Score       5           Vitals:    05/02/21 2125   BP: 145/67   Pulse: 64   Patient Position - Orthostatic VS: Lying         Visual Acuity      ED Medications  Medications   lidocaine (XYLOCAINE) 2 % topical gel 1 application (1 application Topical Given 5/2/21 2146)   neomycin-bacitracin-polymyxin b (NEOSPORIN) ointment 1 small application (1 small application Topical Given 5/2/21 2236)       Diagnostic Studies  Results Reviewed     None                 No orders to display              Procedures  Laceration repair    Date/Time: 5/2/2021 10:26 PM  Performed by: Vibha Irving MD  Authorized by: Vibha Irving MD   Consent given by: patient  Patient identity confirmed: arm band and verbally with patient  Body area: upper extremity  Location details: right lower arm  Laceration length: 5 cm  Foreign bodies: no foreign bodies  Tendon involvement: none  Nerve involvement: none  Vascular damage: no    Anesthesia:  Local Anesthetic: topical anesthetic      Procedure Details:  Irrigation solution: saline  Irrigation method: syringe  Amount of cleaning: standard  Debridement: none  Degree of undermining: none  Skin closure: Steri-Strips (9)  Approximation difficulty: simple  Dressing: gauze roll  Patient tolerance: patient tolerated the procedure well with no immediate complications               ED Course                             SBIRT 22yo+      Most Recent Value   SBIRT (24 yo +)   In order to provide better care to our patients, we are screening all of our patients for alcohol and drug use  Would it be okay to ask you these screening questions? Yes Filed at: 05/02/2021 2129   Initial Alcohol Screen: US AUDIT-C    1  How often do you have a drink containing alcohol?  0 Filed at: 05/02/2021 2129   2  How many drinks containing alcohol do you have on a typical day you are drinking? 0 Filed at: 05/02/2021 2129   3a  Male UNDER 65: How often do you have five or more drinks on one occasion? 0 Filed at: 05/02/2021 2129   3b  FEMALE Any Age, or MALE 65+: How often do you have 4 or more drinks on one occassion? 0 Filed at: 05/02/2021 2129   Audit-C Score  0 Filed at: 05/02/2021 2129   MARBIN: How many times in the past year have you    Used an illegal drug or used a prescription medication for non-medical reasons? Never Filed at: 05/02/2021 2129                    MDM  Number of Diagnoses or Management Options  Skin tear of left upper extremity: new and requires workup  Diagnosis management comments: 80year old female presents for evaluation of skin tear  Tetanus up to date  Wound repaired at bedside with steri strips  PCP follow up for wound recheck       Patient Progress  Patient progress: stable      Disposition  Final diagnoses:   Skin tear of left upper extremity Time reflects when diagnosis was documented in both MDM as applicable and the Disposition within this note     Time User Action Codes Description Comment    5/2/2021 10:14 PM Jenae Chacon Add [U74 [id number]] Skin tear of left upper extremity       ED Disposition     ED Disposition Condition Date/Time Comment    Discharge Stable Sun May 2, 2021 10:14 PM Naz Counts discharge to home/self care              Follow-up Information     Follow up With Specialties Details Why Contact Info Additional Information    Pamella Haley MD Internal Medicine Schedule an appointment as soon as possible for a visit in 3 days For wound re-check [address] [phone]        [address] Emergency Department Emergency Medicine Go to  If symptoms worsen [address] [id number]  [address] [facility] Emergency Department, [address] [place] [address] 10          Discharge Medication List as of 5/2/2021 10:14 PM      CONTINUE these medications which have NOT CHANGED    Details   acetaminophen (TYLENOL) 325 mg tablet Take 2 tablets (650 mg total) by mouth every 6 (six) hours as needed for mild pain, headaches or fever, Starting Wed 2/19/2020, No Print      aspirin 81 MG tablet Take 1 tablet by mouth daily, Historical Med      Cyanocobalamin (B-12) 1000 MCG CAPS Take 1 tablet by mouth daily, Historical Med      diclofenac sodium (VOLTAREN) 1 % Apply 2 g topically 4 (four) times a day, Starting Wed 9/23/2020, No Print      docusate sodium (COLACE) 100 mg capsule Take 1 capsule (100 mg total) by mouth 2 (two) times a day, Starting Wed 9/23/2020, No Print      esomeprazole (NexIUM) 40 MG capsule Take 1 capsule by mouth Daily, Starting Mon 8/24/2009, Historical Med      furosemide (LASIX) 20 mg tablet Take 20 mg by mouth, Starting Fri 3/13/2020, Historical Med      levothyroxine 75 mcg tablet Take 1 tablet by mouth daily, Historical Med      magnesium chloride (MAG64) 64 MG TBEC EC tablet Take 1 tablet by mouth, Historical Med      metFORMIN (GLUCOPHAGE-XR) 750 mg 24 hr tablet TAKE 1 TABLET (750 MG TOTAL) BY MOUTH DAILY WITH BREAKFAST , Historical Med      omeprazole (PRILOSEC) 20 mg delayed release capsule Take 1 capsule by mouth daily, Historical Med      simvastatin (ZOCOR) 40 mg tablet Take 1 tablet by mouth daily, Historical Med      sitaGLIPtin (JANUVIA) 25 mg tablet Take 25 mg by mouth daily, Starting Mon 4/13/2020, Until Tue 4/13/2021, Historical Med           No discharge procedures on file      PDMP Review     None          ED Provider  Electronically Signed by           Kriss Burch MD  05/02/21 7802

## 2021-09-29 NOTE — TELEPHONE ENCOUNTER
New Patient Encounter    New Patient Intake Form   Patient Details:  Cori Moser  1/3/1932  [id number]    Background Information:  [address] starts by opening a telephone encounter and gathering the following information   Who is calling to schedule? If not self, relationship to patient? Patient   Referring Provider Cecily Torres - Derm   What is the diagnosis? cancer on her left neck   Is this Cancer or Non-Cancer? Cancer   Is this diagnosis confirmed? Yes   When was the diagnosis? 9/2021   Is there a confirmed diagnosis from a biopsy/tissue reviewed by pathology? Yes   Were outside slides requested? Yes   Is patient aware of diagnosis? Yes   Is there a personal history and what kind? Yes - kidney, liver ca 2013   Is there a family history and what kind? No   Reason for visit? New Diagnosis   Have you had any imaging or labs done? If so: when, where? yes  [facility]   Are records in EPIC? yes   Are records needed form an outside facility? No   If yes, name, city, state where facility is located  na   If patient has a prior history of cancer were old records obtained? Yes - [facility]   Was the patient told to bring a disk? No   Does the patient smoke or Vape? No   If yes, how many packs or cartridges per day? na   Scheduling Information:   Preferred [place]:  [facility]     Are there any dates/time the patient cannot be seen?  no   Miscellaneous: Scheduled 9/30 with Dr Mary Jane Rios - per note from nurse - schedule asap

## 2021-09-30 NOTE — PROGRESS NOTES
Surgical Oncology Consultation    [address] 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, hematopoietic and related tissue ([address] )   -     NM PET CT skull base to mid thigh; Future        Chief Complaint   Patient presents with    Cancer     Pt here for consult for cancer on her left neck  No follow-ups on file  Oncology History    No history exists  History of Present Illness  : This is an 25-year-old female who presents today for a new consultation  The patient stated that she presented to her dermatologist secondary to a left neck firmness of the skin  A biopsy was performed, revealing 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  The patient denies any other symptoms  She denies any significant fatigue, bright red blood per rectum, weight loss, jaundice, other systemic symptoms  She cannot recall the date of her last colonoscopy or her last mammogram but states that both were negative  She does have a history of renal cell carcinoma for which she underwent a left nephrectomy in 2014  It was recommended that she have adjuvant radiation but she declined this  She states that at the time of her nephrectomy a right liver metastasis was noted and has remained in place  I cannot find any record of this nor can I find any demonstration of it on imaging prior to 2 years ago    An MRI was recommended for better characterization but this was not pursued  Likewise, the patient never received systemic therapy or a biopsy of this lesion  The patient is otherwise doing well with no concerns today  She uses the walker and drives a car and accomplishes all of her own ADLs  She has an ECOG of 0  Review of Systems  Complete ROS Surg Onc:   Constitutional: The patient denies new or recent history of general fatigue, no recent weight loss, no change in appetite  Eyes: No complaints of visual problems, no scleral icterus  ENT: No complaints of ear pain, no hoarseness, no difficulty swallowing,  no tinnitus and no new masses in head, oral cavity, or neck  Cardiovascular: No complaints of chest pain, no palpitations, no ankle edema  Respiratory: No complaints of shortness of breath, no cough  Gastrointestinal: No complaints of jaundice, no bloody stools, no pale stools  Genitourinary: No complaints of dysuria, no hematuria, no nocturia, no frequent urination, no urethral discharge  Musculoskeletal: No complaints of weakness, paralysis, joint stiffness or arthralgias  Integumentary: No complaints of rash, no new lesions  Neurological: No complaints of convulsions, no seizures, no dizziness  Hematologic/Lymphatic: No complaints of easy bruising  Endocrine:  No hot or cold intolerance  No polydipsia, polyphagia, or polyuria  Allergy/immunology:  No environmental allergies  No food allergies  Not immunocompromised        Patient Active Problem List   Diagnosis    Cholelithiasis    Syncope and collapse    Hypothyroidism    CKD (chronic kidney disease)    Hyperlipidemia    Hypertension    Elevated troponin    CAD (coronary artery disease)    Type 2 diabetes mellitus, without long-term current use of insulin (HCC)    Weight loss    Fall at home, subsequent encounter    Chest pain    Liver lesion, right lobe     Past Medical History:   Diagnosis Date    Acquired absence of kidney     Frequency of micturition     GERD (gastroesophageal reflux disease)     Hypertension     Malignant neoplasm of left kidney, except renal pelvis (HCC)     Nocturia     Personal history of kidney cancer     Personal history of other malignant neoplasm of kidney     Renal mass, left     UTI (urinary tract infection)      Past Surgical History:   Procedure Laterality Date    RADICAL NEPHRECTOMY Left 2013     Family History   Problem Relation Age of Onset    Diabetes Father     Heart failure Brother      Social History     Socioeconomic History    Marital status:      Spouse name: Not on file    Number of children: Not on file    Years of education: Not on file    Highest education level: Not on file   Occupational History    Not on file   Tobacco Use    Smoking status: Never Smoker    Smokeless tobacco: Never Used   Substance and Sexual Activity    Alcohol use: Never    Drug use: Never    Sexual activity: Not on file   Other Topics Concern    Not on file   Social History Narrative    Not on file     Social Determinants of Health     Financial Resource Strain:     Difficulty of Paying Living Expenses:    Food Insecurity:     Worried About Running Out of Food in the Last Year:     [address] in the Last Year:    Transportation Needs:     Lack of Transportation (Medical):      Lack of Transportation (Non-Medical):    Physical Activity:     Days of Exercise per Week:     Minutes of Exercise per Session:    Stress:     Feeling of Stress :    Social Connections:     Frequency of Communication with Friends and Family:     Frequency of Social Gatherings with Friends and Family:     Attends Yarsani Services:     Active Member of Clubs or Organizations:     Attends Club or Organization Meetings:     Marital Status:    Intimate Partner Violence:     Fear of Current or Ex-Partner:     Emotionally Abused:     Physically Abused:     Sexually Abused:        Current Outpatient Medications:    acetaminophen (TYLENOL) 325 mg tablet, Take 2 tablets (650 mg total) by mouth every 6 (six) hours as needed for mild pain, headaches or fever, Disp: 30 tablet, Rfl: 0    aspirin 81 MG tablet, Take 1 tablet by mouth daily, Disp: , Rfl:     Cyanocobalamin (B-12) 1000 MCG CAPS, Take 1 tablet by mouth daily, Disp: , Rfl:     diclofenac sodium (VOLTAREN) 1 %, Apply 2 g topically 4 (four) times a day, Disp:  , Rfl: 0    docusate sodium (COLACE) 100 mg capsule, Take 1 capsule (100 mg total) by mouth 2 (two) times a day, Disp: 10 capsule, Rfl: 0    esomeprazole (NexIUM) 40 MG capsule, Take 1 capsule by mouth Daily, Disp: , Rfl:     furosemide (LASIX) 20 mg tablet, Take 20 mg by mouth, Disp: , Rfl:     levothyroxine 75 mcg tablet, Take 1 tablet by mouth daily, Disp: , Rfl:     magnesium chloride (MAG64) 64 MG TBEC EC tablet, Take 1 tablet by mouth, Disp: , Rfl:     metFORMIN (GLUCOPHAGE-XR) 750 mg 24 hr tablet, TAKE 1 TABLET (750 MG TOTAL) BY MOUTH DAILY WITH BREAKFAST , Disp: , Rfl: 1    omeprazole (PRILOSEC) 20 mg delayed release capsule, Take 1 capsule by mouth daily, Disp: , Rfl:     simvastatin (ZOCOR) 40 mg tablet, Take 1 tablet by mouth daily, Disp: , Rfl:     sitaGLIPtin (JANUVIA) 25 mg tablet, Take 25 mg by mouth daily, Disp: , Rfl:   Allergies   Allergen Reactions    Ace Inhibitors      Other reaction(s): Hypotension    Cephalexin     Clindamycin     Doxycycline     Erythromycin Base     Lactate     Levaquin [Levofloxacin]     Penicillins     Procainamide     Quinidine (Non-Therapeutic)     Sulfa Antibiotics        Vitals:    09/30/21 1440   BP: 124/70   Pulse: 81   Resp: 16   Temp: (!) 96 6 °F (35 9 °C)   SpO2: 98%       Physical Exam   General: Appears well, appears stated age  Skin: Warm, anicteric  HEENT: Normocephalic, atraumatic; sclera aniceteric, mucous membranes moist; cervical nodes without adenopathy  Site of the left neck biopsy cannot be determined    Breast:   Bilateral breast ptosis no abnormality detected  Cardiopulmonary: RRR, Easy WOB, no BLE edema  Abd: Flat and soft, nontender, no masses appreciated, no hepatosplenomegaly  MSK: Symmetric, no cyanosis, no overt weakness  Lymphatic: No cervical, axillary or inguinal lymphadenopathy  Neuro: Affect appropriate, no gross motor abnormalities      Pathology:  Poorly differentiated carcinoma    Labs: Reviewed in EPIC    Imaging  No results found  I independently reviewed and interpreted the above laboratory and imaging data  I reviewed the patient's care everywhere [facility] records including urology   phone notes, medical Oncology, cross-sectional imaging  Discussion/Summary: This is an 80-year-old female with a left neck skin lesion that demonstrated poorly differentiated carcinoma of an unknown source  The patient does have a history of renal cell carcinoma  Unfortunately at this time, cannot find the pathology report as she underwent a left nephrectomy in 2014 and is not available  She states that radiation was recommended however she declined this  Likewise she does state as described above that she has had a liver lesion present for some time  I cannot find documentation of this fluid liver lesion past 2 years in the past   Likewise, she never had an MRI of this lesion nor did she have a biopsy that confirmed metastasis  Patient is under the impression that her surgeon found this lesion at the time of surgery and confirmed that it was a cancer  However, again, she never had chemotherapy or any other systemic treatment or any documentation of malignancy that I can not find  At this juncture, I recommend proceeding with a PET scan to determine whether a source of tumor can be identified  The patient understands this is like to proceed  She will return following this study

## 2021-09-30 NOTE — TELEPHONE ENCOUNTER
Intake received  Insurance reviewed  Financial counseling outreach not needed at this time    Pt has medicare & bc

## 2021-12-05 PROBLEM — R94.31 PROLONGED Q-T INTERVAL ON ECG: Status: ACTIVE | Noted: 2021-01-01

## 2021-12-05 PROBLEM — I48.91 ATRIAL FIBRILLATION (HCC): Status: ACTIVE | Noted: 2021-01-01

## 2021-12-05 PROBLEM — C79.9 METASTATIC CANCER (HCC): Status: ACTIVE | Noted: 2021-01-01

## 2021-12-05 PROBLEM — E87.1 HYPONATREMIA: Status: ACTIVE | Noted: 2021-01-01

## 2021-12-06 PROBLEM — M79.641 RIGHT HAND PAIN: Status: ACTIVE | Noted: 2021-01-01

## 2021-12-06 PROBLEM — I49.9 CARDIAC RHYTHM DISORDER OR DISTURBANCE OR CHANGE: Status: ACTIVE | Noted: 2021-01-01

## 2021-12-07 PROBLEM — I49.8 SINUS ARRHYTHMIA: Status: ACTIVE | Noted: 2021-01-01

## 2021-12-08 PROBLEM — M54.41 ACUTE MIDLINE LOW BACK PAIN WITH RIGHT-SIDED SCIATICA: Status: ACTIVE | Noted: 2021-01-01

## 2021-12-08 PROBLEM — R33.9 URINARY RETENTION: Status: ACTIVE | Noted: 2021-01-01

## 2021-12-08 PROBLEM — C79.51 METASTATIC RENAL CELL CARCINOMA TO BONE (HCC): Status: ACTIVE | Noted: 2021-01-01

## 2021-12-08 PROBLEM — C64.9 METASTATIC RENAL CELL CARCINOMA TO BONE (HCC): Status: ACTIVE | Noted: 2021-01-01

## 2021-12-09 PROBLEM — N17.9 ACUTE-ON-CHRONIC KIDNEY INJURY (HCC): Status: ACTIVE | Noted: 2019-09-13

## 2021-12-12 PROBLEM — R62.7 FAILURE TO THRIVE IN ADULT: Status: ACTIVE | Noted: 2021-01-01

## 2021-12-13 PROBLEM — K59.00 CONSTIPATION: Status: ACTIVE | Noted: 2021-01-01

## 2021-12-16 PROBLEM — R41.89 COGNITIVE IMPAIRMENT: Status: ACTIVE | Noted: 2021-01-01

## 2021-12-26 PROBLEM — R26.2 AMBULATORY DYSFUNCTION: Status: ACTIVE | Noted: 2021-01-01

## 2021-12-30 PROBLEM — Z90.5 HISTORY OF NEPHRECTOMY: Status: ACTIVE | Noted: 2021-01-01

## 2021-12-30 PROBLEM — N18.9 CHRONIC KIDNEY DISEASE: Status: ACTIVE | Noted: 2021-01-01

## 2021-12-30 PROBLEM — N17.9 ACUTE-ON-CHRONIC KIDNEY INJURY (HCC): Status: RESOLVED | Noted: 2019-09-13 | Resolved: 2021-01-01

## 2021-12-30 PROBLEM — R53.81 DEBILITY: Status: ACTIVE | Noted: 2021-01-01

## 2021-12-30 PROBLEM — E83.42 HYPOMAGNESEMIA: Status: ACTIVE | Noted: 2021-01-01

## 2021-12-30 PROBLEM — N18.9 ACUTE-ON-CHRONIC KIDNEY INJURY (HCC): Status: RESOLVED | Noted: 2019-09-13 | Resolved: 2021-01-01

## 2022-01-01 ENCOUNTER — TELEPHONE (OUTPATIENT)
Dept: GERIATRICS | Age: 87
End: 2022-01-01

## 2022-01-01 ENCOUNTER — NURSING HOME VISIT (OUTPATIENT)
Dept: GERIATRICS | Facility: OTHER | Age: 87
End: 2022-01-01
Payer: MEDICARE

## 2022-01-01 ENCOUNTER — TELEPHONE (OUTPATIENT)
Dept: SURGICAL ONCOLOGY | Facility: CLINIC | Age: 87
End: 2022-01-01

## 2022-01-01 ENCOUNTER — TELEPHONE (OUTPATIENT)
Dept: OTHER | Facility: HOSPITAL | Age: 87
End: 2022-01-01

## 2022-01-01 ENCOUNTER — TELEPHONE (OUTPATIENT)
Dept: PALLIATIVE MEDICINE | Facility: CLINIC | Age: 87
End: 2022-01-01

## 2022-01-01 DIAGNOSIS — C79.51 METASTATIC RENAL CELL CARCINOMA TO BONE (HCC): ICD-10-CM

## 2022-01-01 DIAGNOSIS — M25.561 CHRONIC PAIN OF BOTH KNEES: ICD-10-CM

## 2022-01-01 DIAGNOSIS — C64.9 METASTATIC RENAL CELL CARCINOMA TO BONE (HCC): Primary | ICD-10-CM

## 2022-01-01 DIAGNOSIS — I10 PRIMARY HYPERTENSION: ICD-10-CM

## 2022-01-01 DIAGNOSIS — E83.42 HYPOMAGNESEMIA: Primary | ICD-10-CM

## 2022-01-01 DIAGNOSIS — K21.9 GASTROESOPHAGEAL REFLUX DISEASE WITHOUT ESOPHAGITIS: ICD-10-CM

## 2022-01-01 DIAGNOSIS — N18.4 TYPE 2 DIABETES MELLITUS WITH STAGE 4 CHRONIC KIDNEY DISEASE, WITHOUT LONG-TERM CURRENT USE OF INSULIN (HCC): ICD-10-CM

## 2022-01-01 DIAGNOSIS — M25.562 CHRONIC PAIN OF BOTH KNEES: ICD-10-CM

## 2022-01-01 DIAGNOSIS — R26.2 AMBULATORY DYSFUNCTION: ICD-10-CM

## 2022-01-01 DIAGNOSIS — C64.9 METASTATIC RENAL CELL CARCINOMA TO BONE (HCC): ICD-10-CM

## 2022-01-01 DIAGNOSIS — C79.51 METASTATIC RENAL CELL CARCINOMA TO BONE (HCC): Primary | ICD-10-CM

## 2022-01-01 DIAGNOSIS — R41.89 COGNITIVE IMPAIRMENT: ICD-10-CM

## 2022-01-01 DIAGNOSIS — G89.29 CHRONIC PAIN OF BOTH KNEES: ICD-10-CM

## 2022-01-01 DIAGNOSIS — E03.9 HYPOTHYROIDISM, UNSPECIFIED TYPE: ICD-10-CM

## 2022-01-01 DIAGNOSIS — R55 NEAR SYNCOPE: Primary | ICD-10-CM

## 2022-01-01 DIAGNOSIS — E11.22 TYPE 2 DIABETES MELLITUS WITH STAGE 4 CHRONIC KIDNEY DISEASE, WITHOUT LONG-TERM CURRENT USE OF INSULIN (HCC): ICD-10-CM

## 2022-01-01 DIAGNOSIS — K59.00 CONSTIPATION: ICD-10-CM

## 2022-01-01 DIAGNOSIS — E83.42 HYPOMAGNESEMIA: ICD-10-CM

## 2022-01-01 DIAGNOSIS — N18.9 CHRONIC KIDNEY DISEASE, UNSPECIFIED CKD STAGE: ICD-10-CM

## 2022-01-01 DIAGNOSIS — Z90.5 HISTORY OF NEPHRECTOMY: ICD-10-CM

## 2022-01-01 PROCEDURE — 99309 SBSQ NF CARE MODERATE MDM 30: CPT | Performed by: NURSE PRACTITIONER

## 2022-01-01 PROCEDURE — 99316 NF DSCHRG MGMT 30 MIN+: CPT | Performed by: FAMILY MEDICINE

## 2022-01-01 RX ORDER — OMEPRAZOLE 20 MG/1
20 CAPSULE, DELAYED RELEASE ORAL DAILY
COMMUNITY
End: 2022-01-01 | Stop reason: SDUPTHER

## 2022-01-01 RX ORDER — OXYCODONE HYDROCHLORIDE 5 MG/1
5 TABLET ORAL EVERY 4 HOURS PRN
COMMUNITY
End: 2022-01-01

## 2022-01-01 RX ORDER — LEVOTHYROXINE SODIUM 0.07 MG/1
75 TABLET ORAL DAILY
Qty: 30 TABLET | Refills: 0 | Status: SHIPPED | OUTPATIENT
Start: 2022-01-01

## 2022-01-01 RX ORDER — FUROSEMIDE 20 MG/1
40 TABLET ORAL DAILY
Qty: 30 TABLET | Refills: 0 | Status: SHIPPED | OUTPATIENT
Start: 2022-01-01

## 2022-01-01 RX ORDER — SENNOSIDES 8.6 MG
17.2 TABLET ORAL
Qty: 60 TABLET | Refills: 0 | Status: SHIPPED | OUTPATIENT
Start: 2022-01-01

## 2022-01-01 RX ORDER — CHOLECALCIFEROL (VITAMIN D3) 25 MCG
1000 TABLET,CHEWABLE ORAL DAILY
Qty: 30 CAPSULE | Refills: 0 | Status: SHIPPED | OUTPATIENT
Start: 2022-01-01

## 2022-01-01 RX ORDER — OXYCODONE HYDROCHLORIDE 5 MG/1
2.5 TABLET ORAL EVERY 4 HOURS PRN
Qty: 30 TABLET | Refills: 0 | Status: SHIPPED | OUTPATIENT
Start: 2022-01-01

## 2022-01-01 RX ORDER — POLYETHYLENE GLYCOL 3350 17 G/17G
17 POWDER, FOR SOLUTION ORAL DAILY PRN
Qty: 15 EACH | Refills: 0 | Status: SHIPPED | OUTPATIENT
Start: 2022-01-01 | End: 2022-01-01

## 2022-01-01 RX ORDER — CALCIUM CARBONATE 200(500)MG
500 TABLET,CHEWABLE ORAL 2 TIMES DAILY
Qty: 60 TABLET | Refills: 0 | Status: SHIPPED | OUTPATIENT
Start: 2022-01-01

## 2022-01-01 RX ORDER — DOCUSATE SODIUM 100 MG/1
100 CAPSULE, LIQUID FILLED ORAL 2 TIMES DAILY
Qty: 60 CAPSULE | Refills: 0 | Status: SHIPPED | OUTPATIENT
Start: 2022-01-01

## 2022-01-01 RX ORDER — SIMVASTATIN 40 MG
40 TABLET ORAL DAILY
Qty: 30 TABLET | Refills: 0 | Status: SHIPPED | OUTPATIENT
Start: 2022-01-01

## 2022-01-01 RX ORDER — OXYCODONE HYDROCHLORIDE 5 MG/1
2.5 TABLET ORAL EVERY 4 HOURS PRN
Qty: 30 TABLET | Refills: 0 | Status: SHIPPED | OUTPATIENT
Start: 2022-01-01 | End: 2022-01-01 | Stop reason: SDUPTHER

## 2022-01-01 RX ORDER — ASPIRIN 81 MG/1
81 TABLET, CHEWABLE ORAL DAILY
Qty: 30 TABLET | Refills: 0 | Status: SHIPPED | OUTPATIENT
Start: 2022-01-01

## 2022-01-01 RX ORDER — OXYCODONE HYDROCHLORIDE 5 MG/1
5 TABLET ORAL EVERY 4 HOURS PRN
COMMUNITY
End: 2022-01-01 | Stop reason: SDUPTHER

## 2022-01-01 RX ORDER — OMEPRAZOLE 20 MG/1
20 CAPSULE, DELAYED RELEASE ORAL DAILY
Qty: 30 CAPSULE | Refills: 0 | Status: SHIPPED | OUTPATIENT
Start: 2022-01-01

## 2022-01-01 RX ORDER — LIDOCAINE 50 MG/G
2 PATCH TOPICAL DAILY
Qty: 60 PATCH | Refills: 0 | Status: SHIPPED | OUTPATIENT
Start: 2022-01-01

## 2022-01-01 RX ORDER — LANOLIN ALCOHOL/MO/W.PET/CERES
3 CREAM (GRAM) TOPICAL
Qty: 30 TABLET | Refills: 0 | Status: SHIPPED | OUTPATIENT
Start: 2022-01-01

## 2022-01-06 NOTE — ASSESSMENT & PLAN NOTE
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

## 2022-01-06 NOTE — ASSESSMENT & PLAN NOTE
Lab Results   Component Value Date    EGFR 34 12/12/2021    EGFR 35 12/11/2021    EGFR 34 12/10/2021    CREATININE 1 39 (H) 12/12/2021    CREATININE 1 35 (H) 12/11/2021    CREATININE 1 39 (H) 12/10/2021   Baseline creatinine 1 3-1 5  Most recent creatinine 1 55 on 01/03/2022  Avoid nephrotoxins such as NSAIDs  Avoid hypotension  Ensure adequate hydration

## 2022-01-06 NOTE — ASSESSMENT & PLAN NOTE
Patient had capacity evaluation inpatient and deemed incapable of making her own health decisions  Provide supportive care, reorientation, and redirection as needed  Continue delirium precautions

## 2022-01-06 NOTE — ASSESSMENT & PLAN NOTE
Lab Results   Component Value Date    HGBA1C 7 0 (H) 11/10/2021   Blood glucose within acceptable range for patient's age and comorbid conditions  Continue Januvia, metformin  Continue Accu-Cheks as ordered

## 2022-01-06 NOTE — ASSESSMENT & PLAN NOTE
Magnesium level was 1 1 on 01/03/2021, magnesium tablet 800 mg ordered b i d  x3 days then resume 800 mg daily  Recheck magnesium level on 01/10/2022

## 2022-01-06 NOTE — PROGRESS NOTES
[place] [facility] [place]  POS: 31 (STR)  Progress Note    Chief Complaint/Reason for visit:  STR follow up  History obtained from patient, nursing staff, and EMR  History of Present Illness:  25-year-old female seen and examined for STR follow up  Received patient seated in chair and appeared in no distress  Patient was cooperative with exam   Patient expressed her anger and stated that she wanted to go home  Patient had multiple complaints regarding her stay thus far at SNF and she stated that she spoke to administration  She reports having pain to her right lower back  Denies shortness of breath, chest pain, headache, dizziness, abdominal pain, nausea, vomiting, or diarrhea  She reports having a BM 2 days ago; as Colace and MiraLax ordered  Magnesium level was low on 01/03/2021 at 1 1 and extra doses of magnesium tablets were ordered  Past Medical History: unchanged from history and physical  Past Medical History:   Diagnosis Date    Acquired absence of kidney     Frequency of micturition     GERD (gastroesophageal reflux disease)     Hypertension     Malignant neoplasm of left kidney, except renal pelvis (HCC)     Nocturia     Personal history of kidney cancer     Personal history of other malignant neoplasm of kidney     Renal mass, left     UTI (urinary tract infection)      Family History: unchanged from history and physical  Social History: unchanged from history and physical  Resident Since:  12/24/2021  Review of systems: Review of Systems   Constitutional: Positive for appetite change  Negative for chills and diaphoresis  HENT: Negative for congestion and trouble swallowing  Respiratory: Negative for cough and shortness of breath  Cardiovascular: Negative  Gastrointestinal: Negative for abdominal pain  Endocrine: Negative  Genitourinary: Negative for difficulty urinating  Musculoskeletal: Positive for back pain (Right lower back) and gait problem     Neurological: Negative for dizziness, syncope, speech difficulty, light-headedness, numbness and headaches  Psychiatric/Behavioral: Positive for dysphoric mood (Wants to go home)  All other systems reviewed and are negative  Medications: All medication and routine orders were reviewed and updated  Allergies: NKDA  Consults reviewed: Other  Labs/Diagnostics (reviewed by this provider): Copy in Chart    Imaging Reviewed:  None today    Physical Exam  All vital signs were reviewed at facility EMR  Weight:  Temp: 97 5          BP: 128/76           Pulse:    72       Resp:  16       O2 Sat:  Constitutional: Normocephalic  Orientation:Person, Place and Day     Physical Exam  Vitals and nursing note reviewed  Constitutional:       General: She is not in acute distress  Appearance: She is not ill-appearing, toxic-appearing or diaphoretic  Comments: Elderly female who appears with chronic illness  In no distress  HENT:      Head: Normocephalic  Nose: No congestion or rhinorrhea  Mouth/Throat:      Mouth: Mucous membranes are moist       Pharynx: No oropharyngeal exudate  Eyes:      General: No scleral icterus  Right eye: No discharge  Left eye: No discharge  Extraocular Movements: Extraocular movements intact  Conjunctiva/sclera: Conjunctivae normal       Pupils: Pupils are equal, round, and reactive to light  Comments: Wears glasses  Cardiovascular:      Rate and Rhythm: Normal rate and regular rhythm  Pulses: Normal pulses  Pulmonary:      Effort: Pulmonary effort is normal  No respiratory distress  Breath sounds: Normal breath sounds  No wheezing, rhonchi or rales  Abdominal:      General: Bowel sounds are normal  There is no distension  Palpations: Abdomen is soft  Tenderness: There is no abdominal tenderness  There is no guarding  Musculoskeletal:      Cervical back: Neck supple  No rigidity  Right lower leg: Edema (Trace edema) present  Left lower leg: No edema  Comments: Moves all 4 extremities  Lymphadenopathy:      Cervical: No cervical adenopathy  Skin:     General: Skin is warm and dry  Capillary Refill: Capillary refill takes less than 2 seconds  Neurological:      Mental Status: She is alert  Mental status is at baseline  Cranial Nerves: No cranial nerve deficit  Motor: Weakness present  Gait: Gait abnormal    Psychiatric:         Mood and Affect: Mood normal          Behavior: Behavior normal          Thought Content: Thought content normal        Assessment/Plan:  51-year-old female with:    Hypomagnesemia  Magnesium level was 1 1 on 01/03/2021, magnesium tablet 800 mg ordered b i d  x3 days then resume 800 mg daily  Recheck magnesium level on 01/10/2022    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  Continue oxycodone p r n  for pain    Complaining of right lower back pain at time of exam  Follow-up with palliative care  Follow-up with Heme-Onc 01/19/2022  Follow-up with radiation oncology 01/28/2022    Type 2 diabetes mellitus, without long-term current use of insulin (HCC)    Lab Results   Component Value Date    HGBA1C 7 0 (H) 11/10/2021   Blood glucose within acceptable range for patient's age and comorbid conditions  Continue Januvia, metformin  Continue Accu-Cheks as ordered    History of nephrectomy  S/p left nephrectomy in 2014 due to renal cell carcinoma    Chronic kidney disease  Lab Results   Component Value Date    EGFR 34 12/12/2021    EGFR 35 12/11/2021    EGFR 34 12/10/2021    CREATININE 1 39 (H) 12/12/2021    CREATININE 1 35 (H) 12/11/2021    CREATININE 1 39 (H) 12/10/2021   Baseline creatinine 1 3-1 5  Most recent creatinine 1 55 on 01/03/2022  Avoid nephrotoxins such as NSAIDs  Avoid hypotension  Ensure adequate hydration    Ambulatory dysfunction  Continue supportive care at SNF  Continue PT/OT  Continue fall precautions    Cognitive impairment  Patient had capacity evaluation inpatient and deemed incapable of making her own health decisions  Provide supportive care, reorientation, and redirection as needed  Continue delirium precautions    This note was completed in part utilizing m-modal fluency direct voice recognition software  Grammatical errors, random word insertion, spelling mistakes, and incomplete sentences may be an occasional consequence of the system secondary to software limitations, ambient noise and hardware issues  At the time of dictation, efforts were made to edit, clarify and/or correct errors  Please read the chart carefully and recognize, using context, where substitutions have occurred  If you have any questions or concerns about the context, text or information contained within the body of this dictation, please contact myself, the provider, for further clarification      Татьяна Fischer  4/1/31472:42 PM

## 2022-01-07 NOTE — TELEPHONE ENCOUNTER
I tried calling home phone was a constant busy  I called Daniella Michaud (grandchild) left a brief message to call office to schedule hospital follow up

## 2022-01-10 NOTE — PROGRESS NOTES
[address] [place] [address] Drive: [facility]  POS: 31: SNF/Short Term Rehab    NAME: Katie Calvillo  AGE: 80 y o  SEX: female  DATE OF ADMISSION: 12/24/21   DATE OF DISCHARGE:1/12/22   DISCHARGE DISPOSITION: home  Today's Visit: 1/11/20227:06 PM    Reason for admission: Patient was admitted from [facility] [address] for rehabilitation after hospitalization for near-syncope  Course of stay: Patient was admitted to  [address] [place] [address] for rehabilitation due to  physical deconditioning and near-syncope and fall at home  No Significant events during the stay   The patient participated in PT/OT  Assessment/Plan:    Near syncope  Patient was hospitalized for near-syncope event and change in mental status  She was seen by Cardiology telemetry reveals sinus rhythm with sinus arrhythmia and PAC without evidence of atrial fibrillation  Echocardiogram showed an ejection fraction of  58%  Beta-blockers on hold due to bradycardia  No episodes of near-syncope during her stay in the facility  She will need follow-up with PCP in 1 week  Repeat CBC CMP in 1 week  Continue home physical therapy script provided    Hypothyroidism  Continue levothyroxine and monitor TSH    Hypertension  Blood pressure well controlled during her stay in the facility  Continue Lasix simvastatin and aspirin  Follow-up with PCP      Chronic kidney disease  Lab Results   Component Value Date    EGFR 34 12/12/2021    EGFR 35 12/11/2021    EGFR 34 12/10/2021    CREATININE 1 39 (H) 12/12/2021    CREATININE 1 35 (H) 12/11/2021    CREATININE 1 39 (H) 12/10/2021     Continue to encourage p o  hydration  Repeat CMP in 1 week  Follow-up with PCP in 1 week and Nephrology If needed avoid nephrotoxic medication  Ambulatory dysfunction  Recommend to continue physical therapy at home- script provided  Chronic pain of both knees  Most likely secondary to osteoarthritis    Apply lidocaine patches daily  Continue Tylenol and physical therapy  Discharge Medications: See discharge medication list which was reviewed and signed  Status at time of discharge: Stable    Subjective:  Patient seen and examined at bedside, denies chest pain shortness of breath cough  No fever or chills  Reports bilateral knee pain left more so than right  Reports fall has a bruise on left knee  States that her appetite is preserved, denies difficulty sleeping  Denies abdominal pain nausea constipation dysuria  Review of Systems   Constitutional: Negative for chills and fever  HENT: Negative for congestion and rhinorrhea  Respiratory: Negative for cough, shortness of breath and wheezing  Cardiovascular: Negative for chest pain, palpitations and leg swelling  Gastrointestinal: Negative for abdominal pain and constipation  Endocrine: Negative for cold intolerance  Genitourinary: Negative for difficulty urinating, dysuria and hematuria  Musculoskeletal: Positive for arthralgias and gait problem  Skin: Positive for wound  Allergic/Immunologic: Negative for environmental allergies  Neurological: Negative for dizziness and seizures  Hematological: Does not bruise/bleed easily  Psychiatric/Behavioral: Negative for behavioral problems and sleep disturbance  Vital Signs:     Blood pressure 124/78 Heart Rate: 65 Respiratory Rate 18   Temperature 97 7 Oxygen Saturation 95% Weight 145 5lbs    Exam:     Physical Exam  Vitals and nursing note reviewed  Constitutional:       General: She is not in acute distress  Appearance: She is not diaphoretic  HENT:      Head: Normocephalic and atraumatic  Ears:      Comments: [place]  Eyes:      General:         Right eye: No discharge  Left eye: No discharge  Pupils: Pupils are equal, round, and reactive to light  Cardiovascular:      Rate and Rhythm: Normal rate and regular rhythm        Heart sounds: Heart sounds are distant  No murmur heard  Pulmonary:      Effort: Pulmonary effort is normal  No respiratory distress  Breath sounds: Normal breath sounds  No wheezing  Abdominal:      Palpations: Abdomen is soft  Tenderness: There is no abdominal tenderness  There is no guarding or rebound  Musculoskeletal:         General: Tenderness present  Cervical back: Normal range of motion and neck supple  Right lower leg: Edema (trace) present  Left lower leg: Edema (trace) present  Skin:     General: Skin is warm and dry  Findings: Bruising present  Neurological:      Mental Status: She is alert and oriented to person, place, and time  Mental status is at baseline  Psychiatric:      Comments: Agitated and delusional at times         Discussion with patient/family and further instructions:  -Fall precautions  -Aspiration precautions  -Bleeding precautions  -Monitor for signs/symptoms of infection  -Medication list was reviewed and signed  -DME form was completed    Follow-up Recommendations: Please follow-up with your primary care physician within 7-10 days of discharge to review medication changes and current status  Repeat CBC CMP in 1 week  Continue home physical therapy      Alex Ortiz MD  Geriatric Medicine  1/10/22  7:06 PM

## 2022-01-10 NOTE — TELEPHONE ENCOUNTER
Left a message for patient's grandfather  to call office back to schedule an appointment with Palliative Care for the patient

## 2022-01-11 PROBLEM — M25.561 CHRONIC PAIN OF BOTH KNEES: Status: ACTIVE | Noted: 2022-01-01

## 2022-01-11 PROBLEM — G89.29 CHRONIC PAIN OF BOTH KNEES: Status: ACTIVE | Noted: 2022-01-01

## 2022-01-11 PROBLEM — M25.562 CHRONIC PAIN OF BOTH KNEES: Status: ACTIVE | Noted: 2022-01-01

## 2022-01-11 NOTE — ASSESSMENT & PLAN NOTE
Patient was hospitalized for near-syncope event and change in mental status  She was seen by Cardiology telemetry reveals sinus rhythm with sinus arrhythmia and PAC without evidence of atrial fibrillation  Echocardiogram showed an ejection fraction of  58%  Beta-blockers on hold due to bradycardia  No episodes of near-syncope during her stay in the facility  She will need follow-up with PCP in 1 week  Repeat CBC CMP in 1 week    Continue home physical therapy script provided

## 2022-01-12 NOTE — ASSESSMENT & PLAN NOTE
Most likely secondary to osteoarthritis  Apply lidocaine patches daily  Continue Tylenol and physical therapy

## 2022-01-12 NOTE — ASSESSMENT & PLAN NOTE
Blood pressure well controlled during her stay in the facility    Continue Lasix simvastatin and aspirin  Follow-up with PCP

## 2022-01-12 NOTE — ASSESSMENT & PLAN NOTE
Lab Results   Component Value Date    EGFR 34 12/12/2021    EGFR 35 12/11/2021    EGFR 34 12/10/2021    CREATININE 1 39 (H) 12/12/2021    CREATININE 1 35 (H) 12/11/2021    CREATININE 1 39 (H) 12/10/2021     Continue to encourage p o  hydration  Repeat CMP in 1 week  Follow-up with PCP in 1 week and Nephrology If needed avoid nephrotoxic medication

## 2022-01-18 NOTE — TELEPHONE ENCOUNTER
Returned voicemail from patient who is inquiring about status of Home Health as ordered at discharge from [facility]  Patient stated [address] came one time and never came back  This MA noted patient currently has a referral to [address]; patient acknowledged that she called her primary care physician who placed the referral  This MA suggested she contact PCP office to inquire about status of referral  Patient has phone number for [address]

## 2022-01-19 NOTE — TELEPHONE ENCOUNTER
I talked with the patient over the phone about her appointment on 1/19/2022 with Fredi Ya  She thought it was a virtual appointment and the phone she uses for the virtual appointment was her niece's phone  She has since gone back to [place]  She talked with a person on the phone who said they would set the appointment up yesterday

## 2022-01-28 NOTE — TELEPHONE ENCOUNTER
Scheduled patient with Dr Xochitl Tierney on 2/2 at 11:40  Called patient to confirm   It will be a telephone call

## 2022-01-28 NOTE — TELEPHONE ENCOUNTER
Follow up MRI of the Lumber Spine with and without contrast ordered by Dr Renuka Francisco and scheduled for 4/27/22 @ 4:45 PM   She has also been scheduled for a follow up with Dr Avani Lemus on 4/29/22  Needs Med Onc follow up with Dr Vilma Long    Message sent to Dr Harley Quezada team

## 2022-06-14 ENCOUNTER — TELEPHONE (OUTPATIENT)
Dept: RADIATION ONCOLOGY | Facility: HOSPITAL | Age: 87
End: 2022-06-14

## 2022-10-14 NOTE — CASE MANAGEMENT
Pt now requesting [facility] for STR  She wants a private room only  Cm sent a referral  Per Sepideh Nielson they have a private room today after 5pm however family cannot transport  Cm arranged a wheelchair [facility] with Ja Fernandez for [address] [place] [address] on 9/23  Pt aware of the bill and agreeable to pay  Covid test pending  How Severe Are They?: mild Is This A New Presentation, Or A Follow-Up?: Scars